# Patient Record
Sex: FEMALE | Race: WHITE | NOT HISPANIC OR LATINO | Employment: FULL TIME | URBAN - METROPOLITAN AREA
[De-identification: names, ages, dates, MRNs, and addresses within clinical notes are randomized per-mention and may not be internally consistent; named-entity substitution may affect disease eponyms.]

---

## 2017-02-01 ENCOUNTER — ALLSCRIPTS OFFICE VISIT (OUTPATIENT)
Dept: OTHER | Facility: OTHER | Age: 62
End: 2017-02-01

## 2017-02-01 DIAGNOSIS — M10.9 GOUT: ICD-10-CM

## 2017-02-01 DIAGNOSIS — I10 ESSENTIAL (PRIMARY) HYPERTENSION: ICD-10-CM

## 2017-02-01 DIAGNOSIS — N18.30 CHRONIC KIDNEY DISEASE, STAGE III (MODERATE) (HCC): ICD-10-CM

## 2017-02-01 DIAGNOSIS — E11.9 TYPE 2 DIABETES MELLITUS WITHOUT COMPLICATIONS (HCC): ICD-10-CM

## 2017-02-01 DIAGNOSIS — E78.5 HYPERLIPIDEMIA: ICD-10-CM

## 2017-02-01 DIAGNOSIS — E03.9 HYPOTHYROIDISM: ICD-10-CM

## 2017-09-21 ENCOUNTER — HOSPITAL ENCOUNTER (EMERGENCY)
Facility: HOSPITAL | Age: 62
Discharge: HOME | End: 2017-09-22
Attending: EMERGENCY MEDICINE | Admitting: EMERGENCY MEDICINE

## 2017-09-21 ENCOUNTER — APPOINTMENT (EMERGENCY)
Dept: RADIOLOGY | Facility: HOSPITAL | Age: 62
End: 2017-09-21

## 2017-09-21 DIAGNOSIS — M79.671 RIGHT FOOT PAIN: Primary | ICD-10-CM

## 2017-09-21 DIAGNOSIS — I10 HYPERTENSION: ICD-10-CM

## 2017-09-21 PROCEDURE — 73630 X-RAY EXAM OF FOOT: CPT

## 2017-09-21 RX ORDER — ACETAMINOPHEN 325 MG/1
650 TABLET ORAL ONCE
Status: COMPLETED | OUTPATIENT
Start: 2017-09-21 | End: 2017-09-21

## 2017-09-21 RX ORDER — HYDRALAZINE HYDROCHLORIDE 25 MG/1
25 TABLET, FILM COATED ORAL ONCE
Status: COMPLETED | OUTPATIENT
Start: 2017-09-21 | End: 2017-09-21

## 2017-09-21 RX ORDER — GABAPENTIN 400 MG/1
400 CAPSULE ORAL 3 TIMES DAILY
COMMUNITY
End: 2018-02-06 | Stop reason: SDUPTHER

## 2017-09-21 RX ADMIN — HYDRALAZINE HYDROCHLORIDE 25 MG: 25 TABLET, FILM COATED ORAL at 23:21

## 2017-09-21 RX ADMIN — ACETAMINOPHEN 650 MG: 325 TABLET, FILM COATED ORAL at 23:21

## 2017-09-22 VITALS
TEMPERATURE: 98.5 F | SYSTOLIC BLOOD PRESSURE: 201 MMHG | HEART RATE: 80 BPM | DIASTOLIC BLOOD PRESSURE: 93 MMHG | OXYGEN SATURATION: 98 % | RESPIRATION RATE: 16 BRPM | WEIGHT: 200 LBS | BODY MASS INDEX: 29.53 KG/M2

## 2017-09-22 PROCEDURE — 99283 EMERGENCY DEPT VISIT LOW MDM: CPT

## 2018-01-10 NOTE — RESULT NOTES
Verified Results  (1) CBC/PLT/DIFF 52CCP7793 08:51AM Flor Bolivar    Order Number: [id number]     Order Number: [id number]     Test Name Result Flag Reference   WBC COUNT 5 77 Thousand/uL  4 31-10 16   RBC COUNT 4 03 Million/uL  3 81-5 12   HEMOGLOBIN 11 9 g/dL  11 5-15 4   HEMATOCRIT 36 8 %  34 8-46  1   MCV 91 fL  82-98   MCH 29 5 pg  26 8-34 3   MCHC 32 3 g/dL  31 4-37 4   RDW 14 1 %  11 6-15 1   MPV 11 2 fL  8 9-12 7   PLATELET COUNT 491 Thousands/uL  149-390   nRBC AUTOMATED 0 /100 WBCs     NEUTROPHILS RELATIVE PERCENT 57 %  43-75   LYMPHOCYTES RELATIVE PERCENT 22 %  14-44   MONOCYTES RELATIVE PERCENT 10 %  4-12   EOSINOPHILS RELATIVE PERCENT 10 % H 0-6   BASOPHILS RELATIVE PERCENT 1 %  0-1   NEUTROPHILS ABSOLUTE COUNT 3 31 Thousands/µL  1 85-7 62   LYMPHOCYTES ABSOLUTE COUNT 1 29 Thousands/µL  0 60-4 47   MONOCYTES ABSOLUTE COUNT 0 59 Thousand/µL  0 17-1 22   EOSINOPHILS ABSOLUTE COUNT 0 55 Thousand/µL  0 00-0 61   BASOPHILS ABSOLUTE COUNT 0 03 Thousands/µL  0 00-0 10     (1) COMPREHENSIVE METABOLIC PANEL 01ADY2172 43:32VT Flor Bolivar    Order Number: [id number]      National Kidney Disease Education Program recommendations are as follows:  GFR calculation is accurate only with a steady state creatinine  Chronic Kidney disease less than 60 ml/min/1 73 sq  meters  Kidney failure less than 15 ml/min/1 73 sq  meters  Test Name Result Flag Reference   GLUCOSE,RANDM 154 mg/dL H    If the patient is fasting, the ADA then defines impaired fasting glucose as > 100 mg/dL and diabetes as > or equal to 123 mg/dL     SODIUM 143 mmol/L  136-145   POTASSIUM 3 9 mmol/L  3 5-5 3   CHLORIDE 105 mmol/L  100-108   CARBON DIOXIDE 31 mmol/L  21-32   ANION GAP (CALC) 7 mmol/L  4-13   BLOOD UREA NITROGEN 33 mg/dL H 5-25   CREATININE 1 06 mg/dL  0 60-1 30   Standardized to IDMS reference method   CALCIUM 9 0 mg/dL  8 3-10 1   BILI, TOTAL 0 37 mg/dL  0 20-1 00   ALK PHOSPHATAS 160 U/L H    ALT (SGPT) 64 U/L  12-78   AST(SGOT) 49 U/L H 5-45   ALBUMIN 3 4 g/dL L 3 5-5 0   TOTAL PROTEIN 7 3 g/dL  6 4-8 2   eGFR Non-African American 53 2 ml/min/1 73sq m       (1) TSH 22KFD0490 08:51AM Aakash Mckeon   TW Order Number: [id number]    Patients undergoing fluorescein dye angiography may retain small amounts of fluorescein in the body for 48-72 hours post procedure  Samples containing fluorescein can produce falsely depressed TSH values  If the patient had this procedure,a specimen should be resubmitted post fluorescein clearance  The recommended reference ranges for TSH during pregnancy are as follows:  First trimester 0 1 to 2 5 uIU/mL  Second trimester  0 2 to 3 0 uIU/mL  Third trimester 0 3 to 3 0 uIU/m     Test Name Result Flag Reference   TSH 0 475 uIU/mL  0 358-3 740     (1) MICROALBUMIN CREATININE RATIO, RANDOM URINE 06WMH5349 08:51AM Bernie Liz Order Number: [id number]     Test Name Result Flag Reference   MICROALBUMIN/ CREAT R 8 mg/g creatinine  0-30   MICROALBUMIN,URINE 5 2 mg/L  0 0-20 0   CREATININE URINE 61 3 mg/dL       (1) HEMOGLOBIN A1C 12OQA8322 08:51AM Aakash COLLINS Order Number: [id number]      5 7-6 4% impaired fasting glucose  >=6 5% diagnosis of diabetes    Falsely low levels are seen in conditions linked to short RBC life span-  hemolytic anemia, and splenomegaly  Falsely elevated levels are seen in situations where there is an increased production of RBC- receipt of erythropoietin or blood transfusions  Adopted from ADA-Clinical Practice Recommendations     Test Name Result Flag Reference   HEMOGLOBIN A1C 7 1 % H 4 0-5 6   EST  AVG   GLUCOSE 157 mg/dl

## 2018-01-11 NOTE — MISCELLANEOUS
Message   Recorded as Task   Date: 06/01/2016 09:00 AM, Created By: [facility]   Task Name: Follow Up   Assigned To: [facility],Team   Regarding Patient: Claudia Encarnacion, Status: Active   CommentEnmanuel Vora - 01 Jun 2016 9:00 AM     TASK CREATED    Routine post colono call placed to patient  Procedure done on 5/31/16  Patient answered and had no questions or concerns  Path pending  [address] Jun 2016 2:09 PM     TASK EDITED  Called patient with results, voicemail received and message left for patient to return call  [facility] - 08 Jun 2016 3:30 PM     TASK EDITED  Call placed to patient, patient answered and results given  Informed patient next colono is due in 5 years and a reminder letter will be sent in the mail shortly  Patient verbalized understanding  Active Problems    1  Anemia (285 9) (D64 9)   2  Asthma (493 90) (J45 909)   3  Breast cancer screening (V76 10) (Z12 39)   4  Chronic kidney disease, stage 3 (585 3) (N18 3)   5  Chronic venous stasis dermatitis of both lower extremities (454 1) (I83 11,I83 12)   6  Diabetes mellitus, type 2 (250 00) (E11 9)   7  Encounter for screening colonoscopy (V76 51) (Z12 11)   8  Gout (274 9) (M10 9)   9  Hyperlipidemia (272 4) (E78 5)   10  Hypertension (401 9) (I10)   11  Hypothyroidism (244 9) (E03 9)   12  Left wrist pain (719 43) (M25 532)   13  Nephrosclerosis, stage 1 through stage 4 or unspecified chronic kidney disease (403 90)    (I12 9,N18 9)   14  Peripheral neuropathy (356 9) (G62 9)   15  Phlebitis (451 9) (I80 9)   16  Screening for malignant neoplasm of cervix (V76 2) (Z12 4)    Current Meds   1  Accu-Chek FastClix Lancets Miscellaneous; TEST TWICE A DAY  Requested for:   74ETN5278; Last Rx:55Zuk2744 Ordered   2  Accu-Chek SmartView In [employer]; test twice daily  Requested for: 39Mns0324; Last   Rx:91Fkb8661 Ordered   3  Allopurinol 300 MG Oral Tablet; TAKE 1 TABLET DAILY;    Therapy: 73Xul0919 to (Evaluate:20Jun2016)  Requested for: 47Mgd9415; Last   Rx:61Jrh1042 Ordered   4  Furosemide 40 MG Oral Tablet; TAKE 1 TABLET TWICE DAILY  Requested for:   48MZL6842; Last Rx:01Mar2016 Ordered   5  Gabapentin 400 MG Oral Capsule; TAKE 1 CAPSULE 3 TIMES DAILY; Therapy: 15YDH1704 to (Evon Carpenter)  Requested for: 29QXJ1507; Last   Rx:90Ltc3405 Ordered   6  HydrALAZINE HCl - 25 MG Oral Tablet; TAKE ONE TABLET BY MOUTH THREE TIMES   DAILY; Therapy: 83QIA9708 to (Evaluate:15Jun2016)  Requested for: 10NWK0313; Last   Rx:40Wlg6533 Ordered   7  Levothyroxine Sodium 150 MCG Oral Tablet; take one tablet by mouth daily; Therapy: 26KAG1120 to (Evaluate:20Jun2016)  Requested for: 21Apr2016; Last   Rx:21Apr2016 Ordered   8  MetFORMIN HCl - 500 MG Oral Tablet; Take one tablet twice daily; Therapy: 34VCZ1939 to (Dharmesh Flkatiuska)  Requested for: 57DDJ3583; Last   Rx:07Mar2016 Ordered   9  Ventolin  (90 Base) MCG/ACT Inhalation Aerosol Solution; USE 2 PUFFS   EVERY 6 HOURS AS DIRECTED; Therapy: 16OOU8297 to (Last Rx:12Apr2016)  Requested for: 12Apr2016 Ordered   10  Voltaren 1 % Transdermal Gel (Diclofenac Sodium); Apply 2 gm to the affected area 3-4    times a day; Therapy: 97FTY4997 to (Last Clorinda Redder)  Requested for: 59ZUM2502 Ordered    Allergies    1  Neosporin OINT   2  Niacin TABS    3   Seafood    Signatures   Electronically signed by : Kishore Poster, ; Jun 8 2016  3:31PM EST                       (Author)

## 2018-01-13 VITALS
BODY MASS INDEX: 32.66 KG/M2 | WEIGHT: 220.5 LBS | HEIGHT: 69 IN | DIASTOLIC BLOOD PRESSURE: 80 MMHG | SYSTOLIC BLOOD PRESSURE: 118 MMHG

## 2018-01-13 NOTE — RESULT NOTES
Verified Results  * MAMMO SCREENING BILATERAL W CAD 27Ytg64 09:22AM Perez Brody Order Number: [id number]     Test Name Result Flag Reference   MAMMO SCREENING BILATERAL W CAD (Report)     Patient History:   Patient is postmenopausal    Family history of breast cancer in maternal aunt at age 79  Patient is an every day smoker  Patient's BMI is 32 3  Reason for exam: screening (asymptomatic)  Mammo Screening Bilateral W CAD: April 26, 2016 - Check In #:    [de-identified]   Bilateral CC and MLO view(s) were taken  Technologist: JOHN Vu (R)(M)   Prior study comparison: July 5, 2007, bilateral screening    mammogram w/CAD performed at [place] [address]  The breast tissue is almost entirely fat  No dominant soft tissue mass, architectural distortion or    suspicious calcifications are noted in either breast  The skin    and nipple structures are within normal limits  Scattered benign   appearing calcifications are noted  No significant changes when compared with prior studies  ASSESSMENT: BiRad:1 - Negative     Recommendation:   Routine screening mammogram of both breasts in 1 year  A    reminder letter will be scheduled  Analyzed by CAD     8-10% of cancers will be missed on mammography  Management of a    palpable abnormality must be based on clinical grounds  Patients   will be notified of their results via letter from our facility  Accredited by [employer] of [employer] and FDA       Transcription Location: JOHN Karina 98: [id number]     Risk Value(s):   Tyrer-Cuzick 10 Year: 3 795%, Tyrer-Cuzick Lifetime: 10 266%,    Myriad Table: 1 5%, AMINAH 5 Year: 0 9%, NCI Lifetime: 5 1%

## 2018-01-14 NOTE — MISCELLANEOUS
Message  Mailed colono letter to patients home address  Tasked PCPs office  {Updated pre-visit planning to reflect 5 year follow up  }      Active Problems    1  Anemia (285 9) (D64 9)   2  Asthma (493 90) (J45 909)   3  Breast cancer screening (V76 10) (Z12 39)   4  Chronic kidney disease, stage 3 (585 3) (N18 3)   5  Chronic venous stasis dermatitis of both lower extremities (454 1) (I83 11,I83 12)   6  Diabetes mellitus, type 2 (250 00) (E11 9)   7  Encounter for screening colonoscopy (V76 51) (Z12 11)   8  Gout (274 9) (M10 9)   9  Hyperlipidemia (272 4) (E78 5)   10  Hypertension (401 9) (I10)   11  Hypothyroidism (244 9) (E03 9)   12  Left wrist pain (719 43) (M25 532)   13  Nephrosclerosis, stage 1 through stage 4 or unspecified chronic kidney disease (403 90)    (I12 9,N18 9)   14  Peripheral neuropathy (356 9) (G62 9)   15  Phlebitis (451 9) (I80 9)   16  Screening for malignant neoplasm of cervix (V76 2) (Z12 4)    Current Meds   1  Accu-Chek FastClix Lancets Miscellaneous; TEST TWICE A DAY  Requested for:   94MLJ1717; Last Rx:89Obu5572 Ordered   2  Accu-Chek SmartView In [employer]; test twice daily  Requested for: 90Cvf9120; Last   Rx:31Toc8196 Ordered   3  Allopurinol 300 MG Oral Tablet; TAKE 1 TABLET DAILY; Therapy: 71HHY2330 to (Evaluate:20Jun2016)  Requested for: 06Men1195; Last   Rx:58Gim5922 Ordered   4  Furosemide 40 MG Oral Tablet; TAKE 1 TABLET TWICE DAILY  Requested for:   10AZJ8888; Last Rx:01Mar2016 Ordered   5  Gabapentin 400 MG Oral Capsule; TAKE 1 CAPSULE 3 TIMES DAILY; Therapy: 50VVF6561 to (Nathan Stewart)  Requested for: 82Tea1505; Last   Rx:17Fsc2032 Ordered   6  HydrALAZINE HCl - 25 MG Oral Tablet; TAKE ONE TABLET BY MOUTH THREE TIMES   DAILY; Therapy: 48KXX3823 to (Evaluate:15Jun2016)  Requested for: 32WGL4151; Last   Rx:84Cyy0122 Ordered   7  Levothyroxine Sodium 150 MCG Oral Tablet; take one tablet by mouth daily;    Therapy: 34FYY7109 to (Evaluate:20Jun2016)  Requested for: 21Apr2016; Last   Rx:21Apr2016 Ordered   8  MetFORMIN HCl - 500 MG Oral Tablet; Take one tablet twice daily; Therapy: 90SFX2186 to (Billie Alexander)  Requested for: 15VXD6543; Last   Rx:07Mar2016 Ordered   9  Ventolin  (90 Base) MCG/ACT Inhalation Aerosol Solution; USE 2 PUFFS   EVERY 6 HOURS AS DIRECTED; Therapy: 54ZUS1182 to (Last Rx:12Apr2016)  Requested for: 27Ngl0329 Ordered   10  Voltaren 1 % Transdermal Gel (Diclofenac Sodium); Apply 2 gm to the affected area 3-4    times a day; Therapy: 02KML3674 to (Last Ruthell Bears)  Requested for: 84ZOX1201 Ordered    Allergies    1  Neosporin OINT   2  Niacin TABS    3  Seafood    Plan  Encounter for screening colonoscopy    · COLONOSCOPY ; every 5 years;  Last 90MOA7242; Next 50DMH6234; Status:Active  Peripheral neuropathy    · Gabapentin 400 MG Oral Capsule; TAKE 1 CAPSULE 3 TIMES DAILY    Signatures   Electronically signed by : Adin Lo, ; Jun 14 2016 12:52PM EST                       (Author)

## 2018-01-15 NOTE — MISCELLANEOUS
Message   Recorded as Task   Date: 06/06/2016 11:01 AM, Created By: Rosario Ghosh   Task Name: Go to Result   Assigned To: [facility],Team   Regarding Patient: Judy Alarcon, Status: Active   CommentTerryl Cubaet - 06 Jun 2016 11:01 AM     TASK CREATED  3-5 year follow-up based on these benign polyps  Please call results  [facility] - 06 Jun 2016 1:54 PM     TASK EDITED  Call placed to patient with colono results  Voicemail recevied and message left for patient to return call  [facility] - 07 Jun 2016 2:09 PM     TASK EDITED  Call patient with results, voicemail received and message left for patient to return call  [facility] - 08 Jun 2016 3:31 PM     TASK EDITED  Call placed to patient, patient answered and results given  Informed patient next colono is due in 5 years and a reminder letter will be sent in the mail shortly  Patient verbalized understanding  Active Problems    1  Anemia (285 9) (D64 9)   2  Asthma (493 90) (J45 909)   3  Breast cancer screening (V76 10) (Z12 39)   4  Chronic kidney disease, stage 3 (585 3) (N18 3)   5  Chronic venous stasis dermatitis of both lower extremities (454 1) (I83 11,I83 12)   6  Diabetes mellitus, type 2 (250 00) (E11 9)   7  Encounter for screening colonoscopy (V76 51) (Z12 11)   8  Gout (274 9) (M10 9)   9  Hyperlipidemia (272 4) (E78 5)   10  Hypertension (401 9) (I10)   11  Hypothyroidism (244 9) (E03 9)   12  Left wrist pain (719 43) (M25 532)   13  Nephrosclerosis, stage 1 through stage 4 or unspecified chronic kidney disease (403 90)    (I12 9,N18 9)   14  Peripheral neuropathy (356 9) (G62 9)   15  Phlebitis (451 9) (I80 9)   16  Screening for malignant neoplasm of cervix (V76 2) (Z12 4)    Current Meds   1  Accu-Chek FastClix Lancets Miscellaneous; TEST TWICE A DAY  Requested for:   95TGK0485; Last Rx:97Bjz7414 Ordered   2  Accu-Chek SmartView In [employer]; test twice daily  Requested for: 95Gva1859;  Last Rx:36Oda9801 Ordered   3  Allopurinol 300 MG Oral Tablet; TAKE 1 TABLET DAILY; Therapy: 68VDH7064 to (Evaluate:20Jun2016)  Requested for: 41Qmn0945; Last   Rx:52Nhn8027 Ordered   4  Furosemide 40 MG Oral Tablet; TAKE 1 TABLET TWICE DAILY  Requested for:   63UQG6267; Last Rx:01Mar2016 Ordered   5  Gabapentin 400 MG Oral Capsule; TAKE 1 CAPSULE 3 TIMES DAILY; Therapy: 22CHO5063 to (Norman John)  Requested for: 97YFX5951; Last   Rx:94Ney0704 Ordered   6  HydrALAZINE HCl - 25 MG Oral Tablet; TAKE ONE TABLET BY MOUTH THREE TIMES   DAILY; Therapy: 70HYG5378 to (Evaluate:15Jun2016)  Requested for: 91OQR4290; Last   Rx:70Wxy2575 Ordered   7  Levothyroxine Sodium 150 MCG Oral Tablet; take one tablet by mouth daily; Therapy: 72GLT7983 to (Evaluate:20Jun2016)  Requested for: 91Vmr9624; Last   Rx:21Apr2016 Ordered   8  MetFORMIN HCl - 500 MG Oral Tablet; Take one tablet twice daily; Therapy: 27JUQ7396 to (467 75 [phone])  Requested for: 63FNC3311; Last   Rx:07Mar2016 Ordered   9  Ventolin  (90 Base) MCG/ACT Inhalation Aerosol Solution; USE 2 PUFFS   EVERY 6 HOURS AS DIRECTED; Therapy: 63DSB8497 to (Last Rx:12Apr2016)  Requested for: 09Eyx4606 Ordered   10  Voltaren 1 % Transdermal Gel (Diclofenac Sodium); Apply 2 gm to the affected area 3-4    times a day; Therapy: 38VHH1812 to (Last Vidhi Villafuerte)  Requested for: 96ZAL0311 Ordered    Allergies    1  Neosporin OINT   2  Niacin TABS    3   Seafood    Signatures   Electronically signed by : Josefina Reyes, ; Jun 8 2016  3:31PM EST                       (Author)

## 2018-02-01 RX ORDER — LEVOTHYROXINE SODIUM 0.15 MG/1
150 TABLET ORAL DAILY
Refills: 0 | OUTPATIENT
Start: 2018-02-01

## 2018-02-01 RX ORDER — FUROSEMIDE 40 MG/1
40 TABLET ORAL 2 TIMES DAILY
Qty: 60 TABLET | Refills: 0 | OUTPATIENT
Start: 2018-02-01

## 2018-02-01 NOTE — TELEPHONE ENCOUNTER
Patient needs an OV she was told that before Dr Miriam Brewer called in meds one time recently and told her she needs OV with me

## 2018-02-05 PROBLEM — E66.811 CLASS 1 OBESITY DUE TO EXCESS CALORIES WITH SERIOUS COMORBIDITY AND BODY MASS INDEX (BMI) OF 33.0 TO 33.9 IN ADULT: Status: ACTIVE | Noted: 2017-02-01

## 2018-02-05 PROBLEM — E66.09 CLASS 1 OBESITY DUE TO EXCESS CALORIES WITH SERIOUS COMORBIDITY AND BODY MASS INDEX (BMI) OF 33.0 TO 33.9 IN ADULT: Status: ACTIVE | Noted: 2017-02-01

## 2018-02-05 PROBLEM — E66.9 OBESITY: Status: ACTIVE | Noted: 2017-02-01

## 2018-02-05 RX ORDER — LANCETS
EACH MISCELLANEOUS 2 TIMES DAILY
COMMUNITY
End: 2019-08-22

## 2018-02-05 RX ORDER — ALLOPURINOL 300 MG/1
1 TABLET ORAL DAILY
COMMUNITY
Start: 2015-12-23 | End: 2018-02-06 | Stop reason: SDUPTHER

## 2018-02-06 ENCOUNTER — OFFICE VISIT (OUTPATIENT)
Dept: FAMILY MEDICINE CLINIC | Facility: CLINIC | Age: 63
End: 2018-02-06
Payer: COMMERCIAL

## 2018-02-06 VITALS
BODY MASS INDEX: 30.81 KG/M2 | HEIGHT: 69 IN | WEIGHT: 208 LBS | SYSTOLIC BLOOD PRESSURE: 180 MMHG | DIASTOLIC BLOOD PRESSURE: 110 MMHG

## 2018-02-06 DIAGNOSIS — N18.30 TYPE 2 DIABETES MELLITUS WITH STAGE 3 CHRONIC KIDNEY DISEASE, WITHOUT LONG-TERM CURRENT USE OF INSULIN (HCC): Primary | ICD-10-CM

## 2018-02-06 DIAGNOSIS — E11.22 TYPE 2 DIABETES MELLITUS WITH STAGE 3 CHRONIC KIDNEY DISEASE, WITHOUT LONG-TERM CURRENT USE OF INSULIN (HCC): Primary | ICD-10-CM

## 2018-02-06 DIAGNOSIS — G60.9 IDIOPATHIC PERIPHERAL NEUROPATHY: Primary | ICD-10-CM

## 2018-02-06 DIAGNOSIS — E03.9 ACQUIRED HYPOTHYROIDISM: ICD-10-CM

## 2018-02-06 DIAGNOSIS — M1A.39X0 CHRONIC GOUT DUE TO RENAL IMPAIRMENT OF MULTIPLE SITES WITHOUT TOPHUS: ICD-10-CM

## 2018-02-06 DIAGNOSIS — D63.1 ANEMIA IN STAGE 3 CHRONIC KIDNEY DISEASE (HCC): ICD-10-CM

## 2018-02-06 DIAGNOSIS — G60.9 IDIOPATHIC PERIPHERAL NEUROPATHY: ICD-10-CM

## 2018-02-06 DIAGNOSIS — N18.30 CHRONIC KIDNEY DISEASE, STAGE 3 (HCC): ICD-10-CM

## 2018-02-06 DIAGNOSIS — I10 ESSENTIAL HYPERTENSION: ICD-10-CM

## 2018-02-06 DIAGNOSIS — E78.2 MIXED HYPERLIPIDEMIA: ICD-10-CM

## 2018-02-06 DIAGNOSIS — E66.09 CLASS 1 OBESITY DUE TO EXCESS CALORIES WITH SERIOUS COMORBIDITY AND BODY MASS INDEX (BMI) OF 33.0 TO 33.9 IN ADULT: ICD-10-CM

## 2018-02-06 DIAGNOSIS — N18.30 ANEMIA IN STAGE 3 CHRONIC KIDNEY DISEASE (HCC): ICD-10-CM

## 2018-02-06 PROCEDURE — 99214 OFFICE O/P EST MOD 30 MIN: CPT | Performed by: FAMILY MEDICINE

## 2018-02-06 RX ORDER — GABAPENTIN 400 MG/1
CAPSULE ORAL
Qty: 90 CAPSULE | Refills: 5 | Status: SHIPPED | OUTPATIENT
Start: 2018-02-06 | End: 2018-10-15 | Stop reason: SDUPTHER

## 2018-02-06 RX ORDER — LEVOTHYROXINE SODIUM 0.15 MG/1
150 TABLET ORAL DAILY
Qty: 30 TABLET | Refills: 3 | Status: SHIPPED | OUTPATIENT
Start: 2018-02-06 | End: 2018-09-11 | Stop reason: SDUPTHER

## 2018-02-06 RX ORDER — HYDRALAZINE HYDROCHLORIDE 25 MG/1
25 TABLET, FILM COATED ORAL 3 TIMES DAILY
Qty: 90 TABLET | Refills: 3 | Status: SHIPPED | OUTPATIENT
Start: 2018-02-06 | End: 2018-02-20 | Stop reason: DRUGHIGH

## 2018-02-06 RX ORDER — FUROSEMIDE 40 MG/1
40 TABLET ORAL 2 TIMES DAILY
Qty: 60 TABLET | Refills: 3 | Status: SHIPPED | OUTPATIENT
Start: 2018-02-06 | End: 2018-09-11 | Stop reason: SDUPTHER

## 2018-02-06 RX ORDER — ALLOPURINOL 300 MG/1
300 TABLET ORAL DAILY
Qty: 30 TABLET | Refills: 3 | Status: SHIPPED | OUTPATIENT
Start: 2018-02-06 | End: 2018-10-15 | Stop reason: SDUPTHER

## 2018-02-06 NOTE — ASSESSMENT & PLAN NOTE
Blood pressure uncontrolled due to  Non compliance Patient was reminded she is at high risk for stroke, heart attack and kidney failure She will restart her medication and have a nurse blood pressure check in 2 weeks

## 2018-02-06 NOTE — PATIENT INSTRUCTIONS
Chronic Hypertension   AMBULATORY CARE:   Hypertension  is high blood pressure (BP)  Your BP is the force of your blood moving against the walls of your arteries  Normal BP is less than 120/80  Prehypertension is between 120/80 and 139/89  Hypertension is 140/90 or higher  Hypertension causes your BP to get so high that your heart has to work much harder than normal  This can damage your heart  Chronic hypertension is a long-term condition that you can control with a healthy lifestyle or medicines  A controlled blood pressure helps protect your organs, such as your heart, lungs, brain, and kidneys  Common symptoms include the following:   · Headache     · Blurred vision    · Chest pain     · Dizziness or weakness     · Trouble breathing     · Nosebleeds  Call 911 for any of the following:   · You have discomfort in your chest that feels like squeezing, pressure, fullness, or pain  · You become confused or have difficulty speaking  · You suddenly feel lightheaded or have trouble breathing  · You have pain or discomfort in your back, neck, jaw, stomach, or arm  Seek care immediately if:   · You have a severe headache or vision loss  · You have weakness in an arm or leg  Contact your healthcare provider if:   · You feel faint, dizzy, confused, or drowsy  · You have been taking your BP medicine and your BP is still higher than your healthcare provider says it should be  · You have questions or concerns about your condition or care  Treatment for chronic hypertension  may include medicine to lower your BP and lower your cholesterol level  A low cholesterol level helps prevent heart disease and makes it easier to control your blood pressure  Heart disease can make your blood pressure harder to control  You may also need to make lifestyle changes  Take your medicine exactly as directed    Manage chronic hypertension:  Talk with your healthcare provider about these and other ways to manage hypertension:  · Take your BP at home  Sit and rest for 5 minutes before you take your BP  Extend your arm and support it on a flat surface  Your arm should be at the same level as your heart  Follow the directions that came with your BP monitor  If possible, take at least 2 BP readings each time  Take your BP at least twice a day at the same times each day, such as morning and evening  Keep a record of your BP readings and bring it to your follow-up visits  Ask your healthcare provider what your blood pressure should be  · Limit sodium (salt) as directed  Too much sodium can affect your fluid balance  Check labels to find low-sodium or no-salt-added foods  Some low-sodium foods use potassium salts for flavor  Too much potassium can also cause health problems  Your healthcare provider will tell you how much sodium and potassium are safe for you to have in a day  He or she may recommend that you limit sodium to 2,300 mg a day  · Follow the meal plan recommended by your healthcare provider  A dietitian or your provider can give you more information on low-sodium plans or the DASH (Dietary Approaches to Stop Hypertension) eating plan  The DASH plan is low in sodium, unhealthy fats, and total fat  It is high in potassium, calcium, and fiber  · Exercise to maintain a healthy weight  Exercise at least 30 minutes per day, on most days of the week  This will help decrease your blood pressure  Ask about the best exercise plan for you  · Decrease stress  This may help lower your BP  Learn ways to relax, such as deep breathing or listening to music  · Limit alcohol  Women should limit alcohol to 1 drink a day  Men should limit alcohol to 2 drinks a day  A drink of alcohol is 12 ounces of beer, 5 ounces of wine, or 1½ ounces of liquor  · Do not smoke  Nicotine and other chemicals in cigarettes and cigars can increase your BP and also cause lung damage   Ask your healthcare provider for information if you currently smoke and need help to quit  E-cigarettes or smokeless tobacco still contain nicotine  Talk to your healthcare provider before you use these products  Follow up with your healthcare provider as directed: You will need to return to have your BP checked and to have other lab tests done  Write down your questions so you remember to ask them during your visits  © 2017 [address] [employer] Information is for End User's use only and may not be sold, redistributed or otherwise used for commercial purposes  All illustrations and images included in CareNotes® are the copyrighted property of [employer]  or [employer]  The above information is an  only  It is not intended as medical advice for individual conditions or treatments  Talk to your doctor, nurse or pharmacist before following any medical regimen to see if it is safe and effective for you  Diabetes in the Older Adult   AMBULATORY CARE:   What you need to know if you are an older adult with diabetes:  Older adults with diabetes are at risk for heart disease, stroke, kidney disease, blindness, and nerve damage  You may also be at risk for any of the following:  · Poor nutrition or low blood sugar levels    · Confusion or problems with memory, attention, or learning new things    · Trouble controlling urination or frequent urinary tract infections    · Trouble with coordination or balance    · Falls and injuries    · Pain    · Depression    · Open sores on your legs or feet  The ABCs of diabetes: The ABCs stand for certain things you can do to manage or prevent problems caused by diabetes:  · A  stands for A1c test   This test shows the average amount of sugar in your blood over the past 2 to 3 months  High levels of sugar in your blood can cause damage to your heart, blood vessels, kidneys, feet, and eyes  Most older adults with diabetes should have an A1c level less than 7 5   Ask your healthcare provider if this A1c goal is right for you  Your provider can help you make changes if your A1c is too high  · B  stands for blood pressure   High blood pressure can increase your risk for a heart attack, stroke, or kidney disease  Most older adults with diabetes should have a systolic blood pressure (first number) of 140  Your diastolic blood pressure (second number) should be below 90  Ask your healthcare provider if these blood pressure goals are right for you  · C  stands for cholesterol   High levels of cholesterol can block your arteries and cause a heart attack or stroke  Ask your healthcare provider what your cholesterol levels should be  · S  stands for stop smoking   Nicotine and other chemicals in cigarettes and cigars can cause lung damage and make it more difficult to manage your diabetes  Call 911 if you have any of the following:   · You have any of the following signs of a stroke:      ¨ Numbness or drooping on one side of your face     ¨ Weakness in an arm or leg    ¨ Confusion or difficulty speaking    ¨ Dizziness, a severe headache, or vision loss    · You have any of the following signs of a heart attack:      ¨ Squeezing, pressure, or pain in your chest that lasts longer than 5 minutes or returns    ¨ Discomfort or pain in your back, neck, jaw, stomach, or arm     ¨ Trouble breathing    ¨ Nausea or vomiting    ¨ Lightheadedness or a sudden cold sweat, especially with chest pain or trouble breathing  Seek care immediately if:   · You have severe abdominal pain, or the pain spreads to your back  You may also be vomiting  · You have trouble staying awake or focusing  · You are shaking or sweating  · You have blurred or double vision  · Your breath has a fruity, sweet smell  · Your breathing is deep and labored, or rapid and shallow  · Your heartbeat is fast and weak  · You fall and get hurt    Contact your healthcare provider if:   · You are vomiting or have diarrhea  · You have an upset stomach and cannot eat the foods on your meal plan  · You feel weak or more tired than usual      · You feel dizzy, have headaches, or are easily irritated  · Your skin is red, warm, dry, or swollen  · You have a wound that does not heal      · You have numbness in your arms or legs  · You have trouble coping with your illness, or you feel anxious or depressed  · You have problems with your memory  · You have changes in your vision  · You have questions or concerns about your condition or care  Treatment for diabetes  includes keeping your blood sugar at a normal level  You must eat the right foods, and exercise regularly  You may need medicine if you cannot control your blood sugar level with nutrition and exercise  You may also need medicine to lower your blood pressure or cholesterol, or medicine to prevent blood clots  Manage the ABCs and prevent problems caused by diabetes:   · Check your blood sugar levels as directed  Your healthcare provider will tell you when and how often to check during the day  Your healthcare provider will also tell you what your blood sugar levels should be before and after a meal  You may need to check for ketones in your urine or blood if your level is higher than directed  Write down your results and show them to your healthcare provider  Your provider may use the results to make changes to your medicine, food, or exercise schedules  Ask your healthcare provider for more information about how to treat a high or low blood sugar level  · Follow your meal plan as directed  A dietitian will help you make a meal plan to keep your blood sugar level steady and make sure you get enough nutrition  Do not skip meals  Your blood sugar level may drop too low if you have taken diabetes medicine and do not eat   Ask your healthcare provider about programs in your community that can deliver the meals to your home  · Try to be active for 30 to 60 minutes most days of the week  Exercise can help keep your blood sugar level steady, decrease your risk of heart disease, and help you lose weight  It can also help improve your balance and decrease your risk for falls  Work with your healthcare provider to create an exercise plan  Ask a family member or friend to exercise with you  Start slow and exercise for 5 to 10 minutes at a time  Examples of activities include walking or swimming  Include muscle strengthening activities 2 to 3 days each week  Include balance training 2 to 3 times each week  Activities that help increase balance include yoga and fred chi      · Maintain a healthy weight  Ask your healthcare provider how much you should weigh  A healthy weight can help you control your diabetes and prevent heart disease  Ask your provider to help you create a weight loss plan if you are overweight  Together you can set manageable weight loss goals  · Do not smoke  Ask your healthcare provider for information if you currently smoke and need help to quit  Do not use e-cigarettes or smokeless tobacco in place of cigarettes or to help you quit  They still contain nicotine  · Manage stress  Stress may increase your blood sugar level  Deep breathing, muscle relaxation, and music may help you relax  Ask your healthcare provider for more information about these practices  Other ways to manage your diabetes:   · Check your feet every day for sores  Look at your whole foot, including the bottom, and between and under your toes  Check for wounds, corns, and calluses  Use a mirror to see the bottom of your feet  The skin on your feet may be shiny, tight, dry, or darker than normal  Your feet may also be cold and pale  Feel your feet by running your hands along the tops, bottoms, sides, and between your toes  Redness, swelling, and warmth are signs of blood flow problems that can lead to a foot ulcer   Do not try to remove corns or calluses yourself  · Wear medical alert identification  Wear medical alert jewelry or carry a card that says you have diabetes  Ask your healthcare provider where to get these items  · Ask about vaccines  You have a higher risk for serious illness if you get the flu, pneumonia, or hepatitis  Ask your healthcare provider if you should get a flu, pneumonia, shingles, or hepatitis B vaccine, and when to get the vaccine  · Keep all appointments  You may need to return to have your A1c checked every 3 months  You will need to return at least once each year to have your feet checked  You will need an eye exam once a year to check for retinopathy  You will also need urine tests every year to check for kidney problems  You may need tests to monitor for heart disease  Write down your questions so you remember to ask them during your visits  · Get help from family and friends  You may need help checking your blood sugar level, giving insulin injections, or preparing your meals  Ask your family and friends to help you with these tasks  Talk to your healthcare provider if you do not have someone at home to help you  A healthcare provider can come to your home to help you with these tasks  Follow up with your healthcare provider as directed: You may need to return to have your A1c checked every 3 months  You will need to return at least once each year to have your feet checked  You will need an eye exam once a year to check for retinopathy  You will also need urine tests every year to check for kidney problems  You may need tests to monitor for heart disease  Write down your questions so you remember to ask them during your visits  © 2017 [facility] Information is for End User's use only and may not be sold, redistributed or otherwise used for commercial purposes   All illustrations and images included in CareNotes® are the copyrighted property of [employer]  or [employer]  The above information is an  only  It is not intended as medical advice for individual conditions or treatments  Talk to your doctor, nurse or pharmacist before following any medical regimen to see if it is safe and effective for you

## 2018-02-06 NOTE — ASSESSMENT & PLAN NOTE
Patient currently non compliant with meds for last 2 months She will have the A1c done patient was again reminded that noncompliance with treatment means she is at increased risk for stroke, blindness and kidney failure She will restart her medications and see me in 1 month

## 2018-02-06 NOTE — PROGRESS NOTES
Assessment/Plan:    Type 2 diabetes mellitus with stage 3 chronic kidney disease, without long-term current use of insulin ([facility])  Patient currently non compliant with meds for last 2 months She will have the A1c done patient was again reminded that noncompliance with treatment means she is at increased risk for stroke, blindness and kidney failure She will restart her medications and see me in 1 month    Essential hypertension  Blood pressure uncontrolled due to  Non compliance Patient was reminded she is at high risk for stroke, heart attack and kidney failure She will restart her medication and have a nurse blood pressure check in 2 weeks       Diagnoses and all orders for this visit:    Type 2 diabetes mellitus with stage 3 chronic kidney disease, without long-term current use of insulin ([facility])  -     metFORMIN (GLUCOPHAGE) 500 mg tablet; Take 1 tablet (500 mg total) by mouth 2 (two) times a day with meals  -     Hemoglobin A1c; Future  -     Lipid panel; Future  -     Microalbumin / creatinine urine ratio    Essential hypertension  -     furosemide (LASIX) 40 mg tablet; Take 1 tablet (40 mg total) by mouth 2 (two) times a day  -     hydrALAZINE (APRESOLINE) 25 mg tablet; Take 1 tablet (25 mg total) by mouth 3 (three) times a day  -     Lipid panel; Future    Chronic kidney disease, stage 3  Comments:  Patient will have labs done and restart her medications She has no insurance and so will not see nephrology at this time  Orders:  -     furosemide (LASIX) 40 mg tablet; Take 1 tablet (40 mg total) by mouth 2 (two) times a day  -     Uric acid;  Future    Anemia in stage 3 chronic kidney disease  Comments:  stable in 2016 will repeat labs now  Orders:  -     CBC and differential; Future    Mixed hyperlipidemia  Comments:  patient will have labs done Her last lipids in 2016 were controlled with diet    Chronic gout 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 tophus  Comments:  stable on current medicaion  Orders:  -     allopurinol (ZYLOPRIM) 300 mg tablet; Take 1 tablet (300 mg total) by mouth daily  -     Uric acid; Future    Class 1 obesity due to excess calories with serious comorbidity and body mass index (BMI) of 33 0 to 33 9 in adult  Comments:  discussed continued weight loss and diet    Acquired hypothyroidism  Comments[facility]  [facility]  Orders:  -     levothyroxine 150 mcg tablet; Take 1 tablet (150 mcg total) by mouth daily  -     TSH, 3rd generation with T4 reflex; Future    Idiopathic peripheral neuropathy    Other orders  -     ACCU-CHEK FASTCLIX LANCETS MISC; by Does not apply route 2 (two) times a day  -     glucose blood (ACCU-CHEK SMARTVIEW) test strip; by In Vitro route 2 (two) times a day  -     Discontinue: allopurinol (ZYLOPRIM) 300 mg tablet; Take 1 tablet by mouth daily          Subjective:   Chief Complaint   Patient presents with    Diabetes     refill all meds 30 days     Hypertension    Chronic Kidney Disease    Hyperlipidemia          Patient ID: Emily Moore is a 63 y o  female  Patient is here for followup fo her hypertension, hyperlipidemia, diabetes, hypothyroidism, stage 3 renal disease and anemia due to stage 3 renal disease She has not taken any meds for 6 months due to noncompliance and no insurance She has not had labs since 2016 She ahs not seen nephrology since 2016 either She has not had an office visit here in 1 year patient states she is checking sugars a few times per week and they are in the morning about 110 She is still smoking also       Diabetes   She presents for her follow-up diabetic visit  She has type 2 diabetes mellitus  Pertinent negatives for hypoglycemia include no confusion, dizziness, headaches, nervousness/anxiousness or sweats   Pertinent negatives for diabetes include no blurred vision, no chest pain, no fatigue, no foot paresthesias, no foot ulcerations, no polydipsia, no polyphagia, no polyuria, no visual change, no weakness and no weight loss  There are no hypoglycemic complications  Diabetic complications include autonomic neuropathy, nephropathy and peripheral neuropathy  Pertinent negatives for diabetic complications include no CVA, heart disease, impotence, PVD or retinopathy  Risk factors for coronary artery disease include post-menopausal, tobacco exposure, obesity, hypertension, diabetes mellitus, dyslipidemia and family history  When asked about current treatments, none were reported  She is compliant with treatment some of the time  Her weight is decreasing steadily  She is following a diabetic diet  She has not had a previous visit with a dietitian  She rarely participates in exercise  There is no change in her home blood glucose trend  Her breakfast blood glucose is taken between 7-8 am  Her breakfast blood glucose range is generally 130-140 mg/dl  An ACE inhibitor/angiotensin II receptor blocker is contraindicated  She does not see a podiatrist Eye exam is not current  Hypertension   This is a chronic problem  The current episode started more than 1 year ago  The problem has been waxing and waning since onset  The problem is uncontrolled  Associated symptoms include peripheral edema  Pertinent negatives include no anxiety, blurred vision, chest pain, headaches, malaise/fatigue, neck pain, orthopnea, palpitations, PND, shortness of breath or sweats  There are no associated agents to hypertension  Risk factors for coronary artery disease include diabetes mellitus, dyslipidemia, obesity and sedentary lifestyle  Past treatments include calcium channel blockers and diuretics  Compliance problems include medication cost and psychosocial issues  There is no history of CVA, PVD or retinopathy         The following portions of the patient's history were reviewed and updated as appropriate: allergies, current medications, past medical history, past social history and problem list     Review of Systems   Constitutional: Negative for fatigue, fever, malaise/fatigue, unexpected weight change and weight loss  HENT: Negative for congestion, sinus pain and trouble swallowing  Eyes: Negative for blurred vision, discharge and visual disturbance  Respiratory: Negative for cough, chest tightness, shortness of breath and wheezing  Cardiovascular: Positive for leg swelling  Negative for chest pain, palpitations, orthopnea and PND  Gastrointestinal: Negative for abdominal pain, blood in stool, constipation, diarrhea, nausea and vomiting  Endocrine: Negative for polydipsia, polyphagia and polyuria  Genitourinary: Negative for difficulty urinating, dysuria, frequency, hematuria and impotence  Musculoskeletal: Negative for arthralgias, gait problem, joint swelling and neck pain  Skin: Negative for rash and wound  Allergic/Immunologic: Negative for environmental allergies and food allergies  Neurological: Negative for dizziness, syncope, weakness, numbness and headaches  Hematological: Negative for adenopathy  Does not bruise/bleed easily  Psychiatric/Behavioral: Negative for confusion, decreased concentration and sleep disturbance  The patient is not nervous/anxious  Objective:     Physical Exam   Constitutional: She is oriented to person, place, and time  She appears well-developed and well-nourished  HENT:   Head: Normocephalic and atraumatic  Right Ear: Hearing, tympanic membrane and external ear normal    Left Ear: Hearing, tympanic membrane and external ear normal    Eyes: Conjunctivae and EOM are normal  Pupils are equal, round, and reactive to light  Neck: Neck supple  No thyromegaly present  Cardiovascular: Normal rate and normal heart sounds  Pulmonary/Chest: Effort normal and breath sounds normal  She has no wheezes  She has no rales  Abdominal: Soft  Bowel sounds are normal  She exhibits no distension  There is no tenderness  Musculoskeletal: She exhibits edema   She exhibits no tenderness  Stable varicose veins and edema of the legs   Lymphadenopathy:     She has no cervical adenopathy  Neurological: She is alert and oriented to person, place, and time  No cranial nerve deficit  Coordination normal    Skin: Skin is warm and dry  No rash noted  Psychiatric: She has a normal mood and affect   Her behavior is normal  Judgment and thought content normal

## 2018-02-09 ENCOUNTER — HOSPITAL ENCOUNTER (EMERGENCY)
Facility: HOSPITAL | Age: 63
Discharge: HOME | End: 2018-02-09
Attending: EMERGENCY MEDICINE | Admitting: EMERGENCY MEDICINE

## 2018-02-09 ENCOUNTER — APPOINTMENT (EMERGENCY)
Dept: CT IMAGING | Facility: HOSPITAL | Age: 63
End: 2018-02-09

## 2018-02-09 VITALS
RESPIRATION RATE: 20 BRPM | WEIGHT: 207 LBS | TEMPERATURE: 98.5 F | BODY MASS INDEX: 30.57 KG/M2 | DIASTOLIC BLOOD PRESSURE: 81 MMHG | OXYGEN SATURATION: 97 % | SYSTOLIC BLOOD PRESSURE: 179 MMHG | HEART RATE: 59 BPM

## 2018-02-09 DIAGNOSIS — R51.9 HEADACHE: ICD-10-CM

## 2018-02-09 DIAGNOSIS — R04.0 EPISTAXIS: Primary | ICD-10-CM

## 2018-02-09 LAB
ANION GAP SERPL CALCULATED.3IONS-SCNC: 12 MMOL/L (ref 4–13)
ATRIAL RATE: 65 BPM
BASOPHILS # BLD AUTO: 0.03 THOUSANDS/ΜL (ref 0–0.1)
BASOPHILS NFR BLD AUTO: 0 % (ref 0–1)
BUN SERPL-MCNC: 37 MG/DL (ref 5–25)
CALCIUM SERPL-MCNC: 8.7 MG/DL (ref 8.3–10.1)
CHLORIDE SERPL-SCNC: 105 MMOL/L (ref 100–108)
CO2 SERPL-SCNC: 28 MMOL/L (ref 21–32)
CREAT SERPL-MCNC: 1.32 MG/DL (ref 0.6–1.3)
EOSINOPHIL # BLD AUTO: 0.19 THOUSAND/ΜL (ref 0–0.61)
EOSINOPHIL NFR BLD AUTO: 3 % (ref 0–6)
ERYTHROCYTE [DISTWIDTH] IN BLOOD BY AUTOMATED COUNT: 13.9 % (ref 11.6–15.1)
GFR SERPL CREATININE-BSD FRML MDRD: 44 ML/MIN/1.73SQ M
GLUCOSE SERPL-MCNC: 121 MG/DL (ref 65–140)
HCT VFR BLD AUTO: 35.9 % (ref 34.8–46.1)
HGB BLD-MCNC: 11.7 G/DL (ref 11.5–15.4)
LYMPHOCYTES # BLD AUTO: 1.62 THOUSANDS/ΜL (ref 0.6–4.47)
LYMPHOCYTES NFR BLD AUTO: 22 % (ref 14–44)
MCH RBC QN AUTO: 32.3 PG (ref 26.8–34.3)
MCHC RBC AUTO-ENTMCNC: 32.6 G/DL (ref 31.4–37.4)
MCV RBC AUTO: 99 FL (ref 82–98)
MONOCYTES # BLD AUTO: 0.61 THOUSAND/ΜL (ref 0.17–1.22)
MONOCYTES NFR BLD AUTO: 8 % (ref 4–12)
NEUTROPHILS # BLD AUTO: 4.89 THOUSANDS/ΜL (ref 1.85–7.62)
NEUTS SEG NFR BLD AUTO: 67 % (ref 43–75)
NRBC BLD AUTO-RTO: 0 /100 WBCS
P AXIS: 115 DEGREES
PLATELET # BLD AUTO: 215 THOUSANDS/UL (ref 149–390)
PMV BLD AUTO: 11.1 FL (ref 8.9–12.7)
POTASSIUM SERPL-SCNC: 3.8 MMOL/L (ref 3.5–5.3)
PR INTERVAL: 150 MS
QRS AXIS: 157 DEGREES
QRSD INTERVAL: 96 MS
QT INTERVAL: 392 MS
QTC INTERVAL: 407 MS
RBC # BLD AUTO: 3.62 MILLION/UL (ref 3.81–5.12)
SODIUM SERPL-SCNC: 145 MMOL/L (ref 136–145)
T WAVE AXIS: 169 DEGREES
VENTRICULAR RATE: 65 BPM
WBC # BLD AUTO: 7.34 THOUSAND/UL (ref 4.31–10.16)

## 2018-02-09 PROCEDURE — 93005 ELECTROCARDIOGRAM TRACING: CPT

## 2018-02-09 PROCEDURE — 70450 CT HEAD/BRAIN W/O DYE: CPT

## 2018-02-09 PROCEDURE — 80048 BASIC METABOLIC PNL TOTAL CA: CPT | Performed by: EMERGENCY MEDICINE

## 2018-02-09 PROCEDURE — 85025 COMPLETE CBC W/AUTO DIFF WBC: CPT | Performed by: EMERGENCY MEDICINE

## 2018-02-09 PROCEDURE — 36415 COLL VENOUS BLD VENIPUNCTURE: CPT | Performed by: EMERGENCY MEDICINE

## 2018-02-09 PROCEDURE — 99284 EMERGENCY DEPT VISIT MOD MDM: CPT

## 2018-02-09 PROCEDURE — 93010 ELECTROCARDIOGRAM REPORT: CPT | Performed by: INTERNAL MEDICINE

## 2018-02-09 RX ORDER — ACETAMINOPHEN 325 MG/1
650 TABLET ORAL ONCE
Status: COMPLETED | OUTPATIENT
Start: 2018-02-09 | End: 2018-02-09

## 2018-02-09 RX ORDER — OXYMETAZOLINE HYDROCHLORIDE 0.05 G/100ML
2 SPRAY NASAL ONCE
Status: COMPLETED | OUTPATIENT
Start: 2018-02-09 | End: 2018-02-09

## 2018-02-09 RX ORDER — IBUPROFEN 600 MG/1
600 TABLET ORAL ONCE
Status: COMPLETED | OUTPATIENT
Start: 2018-02-09 | End: 2018-02-09

## 2018-02-09 RX ADMIN — Medication 2 SPRAY: at 20:17

## 2018-02-09 RX ADMIN — ACETAMINOPHEN 650 MG: 325 TABLET, FILM COATED ORAL at 20:57

## 2018-02-09 RX ADMIN — IBUPROFEN 600 MG: 600 TABLET, FILM COATED ORAL at 22:17

## 2018-02-10 NOTE — ED PROVIDER NOTES
History  Chief Complaint   Patient presents with   Lemuel Lara     Pt states she started getting a nose bleed 30 mins ago, denies injury, denies hx of nose bleeds, denies taking blood thiners      HPI    This is a 62 yo F who presents today with epistaxis from left nare  She states no previous history of nose bleeds  States she was at work and blew her nose and started bleeding  States bleeding coming out anteriorly  States does not feel bleeding in the back of her throat  No chest pain or shortness of breath  No hx of polyps  No recent trauma  Does not take any blood thinners  No other compliants  Impression: 62 yo F with epistaxis  Will try afrin, pressure    Prior to Admission Medications   Prescriptions Last Dose Informant Patient Reported? Taking? ACCU-CHEK FASTCLIX LANCETS MISC   Yes No   Sig: by Does not apply route 2 (two) times a day   albuterol (PROVENTIL HFA,VENTOLIN HFA) 90 mcg/act inhaler   Yes No   Sig: Inhale 2 puffs every 6 (six) hours as needed for wheezing     allopurinol (ZYLOPRIM) 300 mg tablet   No No   Sig: Take 1 tablet (300 mg total) by mouth daily   furosemide (LASIX) 40 mg tablet   No No   Sig: Take 1 tablet (40 mg total) by mouth 2 (two) times a day   gabapentin (NEURONTIN) 400 mg capsule   No No   Sig: TAKE ONE CAPSULE BY MOUTH THREE TIMES DAILY   glucose blood (ACCU-CHEK SMARTVIEW) test strip   Yes No   Sig: by In Vitro route 2 (two) times a day   hydrALAZINE (APRESOLINE) 25 mg tablet   No No   Sig: Take 1 tablet (25 mg total) by mouth 3 (three) times a day   levothyroxine 150 mcg tablet   No No   Sig: Take 1 tablet (150 mcg total) by mouth daily   metFORMIN (GLUCOPHAGE) 500 mg tablet   No No   Sig: Take 1 tablet (500 mg total) by mouth 2 (two) times a day with meals      Facility-Administered Medications: None       Past Medical History:   Diagnosis Date    Acute renal failure (ARF) (HCC)     Anemia     Asthma     Atopic dermatitis     Chronic kidney disease     Stage 3    Diabetes mellitus ([facility] [address] )     type 2    Disease of thyroid gland     hypothyroidism    Edema of both legs     Gout     H/O colonoscopy     H/O mammogram     Hyperlipidemia     Hypertension     Nephrosclerosis     Pain in unspecified foot     Peripheral neuropathy     Phlebitis     Venous stasis dermatitis of both lower extremities     Wrist joint pain     LEFT       Past Surgical History:   Procedure Laterality Date    CERVIX SURGERY      COLONOSCOPY N/A 5/31/2016    Procedure: COLONOSCOPY;  Surgeon: Braden Lee MD;  Location: [facility]; Service:     VEIN SURGERY         Family History   Problem Relation Age of Onset    Diabetes Mother     Heart attack Father      I have reviewed and agree with the history as documented      Social History   Substance Use Topics    Smoking status: Current Every Day Smoker     Packs/day: 0 50    Smokeless tobacco: Never Used      Comment: 30    Alcohol use No      Comment: social        Review of Systems  REVIEW OF SYSTEMS  Constitutional:  Denies fever or chills   Eyes:  Denies change in visual acuity   HENT:  Denies nasal congestion or sore throat +epistaxis   Respiratory:  Denies cough or shortness of breath   Cardiovascular:  Denies chest pain or edema   GI:  Denies abdominal pain, nausea, vomiting, bloody stools or diarrhea   :  Denies dysuria   Musculoskeletal:  Denies back pain or joint pain   Integument:  Denies rash   Neurologic:  Denies headache, focal weakness or sensory changes   Endocrine:  Denies polyuria or polydipsia   Lymphatic:  Denies swollen glands   Psychiatric:  Denies depression or anxiety     Physical Exam  ED Triage Vitals   Temperature Pulse Respirations Blood Pressure SpO2   02/09/18 1929 02/09/18 1929 02/09/18 1929 02/09/18 1929 02/09/18 1929   98 5 °F (36 9 °C) 89 20 (!) 232/102 98 %      Temp Source Heart Rate Source Patient Position - Orthostatic VS BP Location FiO2 (%)   02/09/18 1929 02/09/18 1929 02/09/18 2152 02/09/18 1929 --   Temporal Monitor Lying Left arm       Pain Score       02/09/18 2152       8           Orthostatic Vital Signs  Vitals:    02/09/18 1929 02/09/18 1933 02/09/18 2152   BP: (!) 232/102 (!) 222/94 (!) 179/81   Pulse: 89  59   Patient Position - Orthostatic VS:   Lying       Physical Exam  PHYSICAL EXAM    Constitutional:  Well developed, well nourished, no acute distress, non-toxic appearance    HEENT:  Atraumatic, PERRL, conjunctiva normal  Oropharynx moist, no pharyngeal exudates  Left nare with bright red blood unable to find source  No blood in posterior oropharynx  No polyp appreciatedNeck- normal range of motion, no tenderness, supple   Respiratory:  No respiratory distress, normal breath sounds, no rales, no wheezing   Cardiovascular:  Normal rate, normal rhythm, no murmurs, no gallops, no rubs   GI:  Soft, nondistended, normal bowel sounds, nontender, no organomegaly, no mass, no rebound, no guarding   :  No costovertebral angle tenderness   Musculoskeletal:  No edema, no tenderness, no deformities   Back- no tenderness  Integument:  Well hydrated, no rash   Lymphatic:  No lymphadenopathy noted   Neurologic:  Alert & oriented x 3, CN 2-12 normal, normal motor function, normal sensory function, no focal deficits noted   Psychiatric:  Speech and behavior appropriate     ED Medications  Medications   oxymetazoline (AFRIN) 0 05 % nasal spray 2 spray (2 sprays Each Nare Given 2/9/18 2017)   acetaminophen (TYLENOL) tablet 650 mg (650 mg Oral Given 2/9/18 2057)   ibuprofen (MOTRIN) tablet 600 mg (600 mg Oral Given 2/9/18 2217)       Diagnostic Studies  Results Reviewed     Procedure Component Value Units Date/Time    Basic metabolic panel [[id number]]  (Abnormal) Collected:  02/09/18 2057    Lab Status:  Final result Specimen:  Blood from Arm, Right Updated:  02/09/18 2116     Sodium 145 mmol/L      Potassium 3 8 mmol/L      Chloride 105 mmol/L      CO2 28 mmol/L      Anion Gap 12 mmol/L      BUN 37 (H) mg/dL Creatinine 1 32 (H) mg/dL      Glucose 121 mg/dL      Calcium 8 7 mg/dL      eGFR 44 ml/min/1 73sq m     Narrative:         National Kidney Disease Education Program recommendations are as follows:  GFR calculation is accurate only with a steady state creatinine  Chronic Kidney disease less than 60 ml/min/1 73 sq  meters  Kidney failure less than 15 ml/min/1 73 sq  meters  CBC and differential [[id number]]  (Abnormal) Collected:  02/09/18 2057    Lab Status:  Final result Specimen:  Blood from Arm, Right Updated:  02/09/18 2107     WBC 7 34 Thousand/uL      RBC 3 62 (L) Million/uL      Hemoglobin 11 7 g/dL      Hematocrit 35 9 %      MCV 99 (H) fL      MCH 32 3 pg      MCHC 32 6 g/dL      RDW 13 9 %      MPV 11 1 fL      Platelets 561 Thousands/uL      nRBC 0 /100 WBCs      Neutrophils Relative 67 %      Lymphocytes Relative 22 %      Monocytes Relative 8 %      Eosinophils Relative 3 %      Basophils Relative 0 %      Neutrophils Absolute 4 89 Thousands/µL      Lymphocytes Absolute 1 62 Thousands/µL      Monocytes Absolute 0 61 Thousand/µL      Eosinophils Absolute 0 19 Thousand/µL      Basophils Absolute 0 03 Thousands/µL                  CT head without contrast   Final Result by Chris Solitario MD (02/09 2141)      1  No acute intracranial abnormality  2   Chronic small vessel ischemic changes  3   Nasal secretions are seen on the left in keeping with history of epistaxis  Workstation performed: [id number]               Procedures  Procedures      Phone Consults  ED Phone Contact    ED Course  ED Course as of Feb 10 0039   Edna Hakan Feb 09, 2018   2152 States headache in improving  Last blood pressure 190/80    2153 Bleeding stopped, afrin used   Will monitor for rebleeding     2205 Bleeding stopped, blood presssure improved and headache better willl discharge with appropriate return precautions                                MDM  CritCare Time    Disposition  Final diagnoses:   Epistaxis   Headache Time reflects when diagnosis was documented in both MDM as applicable and the Disposition within this note     Time User Action Codes Description Comment    2/9/2018 10:05 PM Ruchi Almaraz Eli Morales U  8  [R04 0] Epistaxis     2/9/2018 10:05 PM Ruchi Almaraz, [address] [facility] Headache       ED Disposition     ED Disposition Condition Comment    Discharge  Mik Rodrigues discharge to home/self care  Condition at discharge: Good        Follow-up Information     Follow up With Specialties Details Why Contact Info    Brook Kendall MD Otolaryngology Schedule an appointment as soon as possible for a visit As needed, If symptoms worsen [id number] [facility] [address]  [facility] [place] [address]          Discharge Medication List as of 2/9/2018 10:11 PM      CONTINUE these medications which have NOT CHANGED    Details   ACCU-CHEK FASTCLIX LANCETS MISC by Does not apply route 2 (two) times a day, Historical Med      albuterol (PROVENTIL HFA,VENTOLIN HFA) 90 mcg/act inhaler Inhale 2 puffs every 6 (six) hours as needed for wheezing , Until Discontinued, Historical Med      allopurinol (ZYLOPRIM) 300 mg tablet Take 1 tablet (300 mg total) by mouth daily, Starting Tue 2/6/2018, Normal      furosemide (LASIX) 40 mg tablet Take 1 tablet (40 mg total) by mouth 2 (two) times a day, Starting Tue 2/6/2018, Normal      gabapentin (NEURONTIN) 400 mg capsule TAKE ONE CAPSULE BY MOUTH THREE TIMES DAILY, Normal      glucose blood (ACCU-CHEK SMARTVIEW) test strip by In Vitro route 2 (two) times a day, Historical Med      hydrALAZINE (APRESOLINE) 25 mg tablet Take 1 tablet (25 mg total) by mouth 3 (three) times a day, Starting Tue 2/6/2018, Normal      levothyroxine 150 mcg tablet Take 1 tablet (150 mcg total) by mouth daily, Starting Tue 2/6/2018, Normal      metFORMIN (GLUCOPHAGE) 500 mg tablet Take 1 tablet (500 mg total) by mouth 2 (two) times a day with meals, Starting Tue 2/6/2018, Normal           No discharge procedures on file      ED Provider  Attending physically available and evaluated Aneta Cox  NATASHA managed the patient along with the ED Attending      Electronically Signed by         Lillard Landau, MD  02/10/18 0502

## 2018-02-10 NOTE — DISCHARGE INSTRUCTIONS
Epistaxis   WHAT YOU SHOULD KNOW:   Epistaxis is a nosebleed  A nosebleed occurs when the blood vessels near the surface of the nasal cavity are injured or damaged  AFTER YOU LEAVE:   Medicines:   · Nasal sprays:  Vasoconstrictor nasal spray is a medicine that helps make nasal blood vessels narrower  This limits the blood flow and stops the bleeding  This medicine also decreases the swelling inside your nose and helps you breathe easier  You may also be directed to use saline or other nasal sprays to add moisture to your nose  · Antibiotics: This medicine is given to help treat or prevent an infection caused by bacteria  · Take your medicine as directed  Call your healthcare provider if you think your medicine is not helping or if you have side effects  Tell him if you are allergic to any medicine  Keep a list of the medicines, vitamins, and herbs you take  Include the amounts, and when and why you take them  Bring the list or the pill bottles to follow-up visits  Carry your medicine list with you in case of an emergency  Follow up with your primary healthcare provider or otolaryngologist within 2 to 3 days or as directed: Any packing in your nose should be removed within 2 to 3 days  Write down your questions so you remember to ask them during your visits  First aid:   · Sit up and lean forward: This will help prevent you from swallowing blood  Spit blood and saliva into a bowl  · Apply pressure to your nose:  Use 2 fingers to pinch your nose shut for 10 minutes  This will help stop the bleeding  Breathe through your mouth  · Apply ice:  Use a cold pack or put crushed ice in a bag, cover with a towel, and place on the bridge of your nose  · Nasal packing:  Pack your nose with a cotton ball, tissue, tampon, or gauze bandage to stop the bleeding  Prevent epistaxis:  · Avoid nose picking and blowing your nose too hard: You can irritate or damage your nose if you pick it  Blowing your nose too hard may cause the bleeding to start again  · Avoid irritants:  Substances that can irritate your nose should be avoided  These include tobacco smoke and chemical sprays such as  that contain ammonia  · Use a cool mist humidifier in your home: This will add the moisture to the air and help keep your nose moist      · Put a small amount of petroleum jelly inside your nostrils: You may apply a small amount of petroleum jelly if you do not have a nasal packing  This will help keep your nose from drying out or getting irritated  Do not put anything else inside your nose unless your primary healthcare provider tells you to do so  Contact your primary healthcare provider or otolaryngologist if:   · You have a fever and are vomiting  · You have pain in and around your nose that is getting worse even after you take pain medicines  · Your nasal pack is loose  · You have questions or concerns about your condition or care  Seek care immediately if:   · Your nasal packing is soaked with blood  · Your nose is still bleeding after 20 minutes, even after you pinch it  · You have a foul-smelling discharge coming out of your nose  · You feel so weak and dizzy that you have trouble standing up  · You have trouble breathing or talking  © 2014 [address] [employer] is for End User's use only and may not be sold, redistributed or otherwise used for commercial purposes  All illustrations and images included in CareNotes® are the copyrighted property of [employer]  or [facility]  The above information is an  only  It is not intended as medical advice for individual conditions or treatments  Talk to your doctor, nurse or pharmacist before following any medical regimen to see if it is safe and effective for you

## 2018-02-10 NOTE — ED NOTES
Pt denies CP, denies SOB, denies dizziness  Pt states she took her blood pressure medication at 1630 today        Harsha Blas RN  02/09/18 1932

## 2018-02-10 NOTE — ED ATTENDING ATTESTATION
Jyotsna Beltran MD, saw and evaluated the patient  I have discussed the patient with the resident/non-physician practitioner and agree with the resident's/non-physician practitioner's findings, Plan of Care, and MDM as documented in the resident's/non-physician practitioner's note, except where noted  All available labs and Radiology studies were reviewed  At this point I agree with the current assessment done in the Emergency Department  I have conducted an independent evaluation of this patient a history and physical is as follows:     27-year-old female presents for evaluation of left-sided nosebleed which started 30 minutes prior to arrival   She denies sensation of striking the back of her throat, coughing up blood  She does report that she has a severe frontal headache with this as well  She denies focal neuro deficits or weakness, chest pain, shortness of breath, back/abdominal pain  Ten systems reviewed otherwise negative  On exam no acute distress, HEENT is sick for dried blood in the left anterior naris without visible vessel or active epistaxis, no evidence of posterior epistaxis, neuro normal, neck normal, lungs normal, cardiac normal, abdomen normal  Medical decision making;-anterior epistaxis-will apply direct pressure, Afrin, reassess  Will pack if necessary  There is no history exam findings suggest posterior epistaxis    Hypertension and headache-will CT head rule out CNS bleed, EKG/labs to rule out end-organ damage, reassess for disposition  Critical Care Time  CritCare Time    Procedures

## 2018-02-14 ENCOUNTER — APPOINTMENT (OUTPATIENT)
Dept: LAB | Facility: HOSPITAL | Age: 63
End: 2018-02-14

## 2018-02-14 ENCOUNTER — HOSPITAL ENCOUNTER (EMERGENCY)
Facility: HOSPITAL | Age: 63
Discharge: HOME | End: 2018-02-14
Attending: EMERGENCY MEDICINE | Admitting: EMERGENCY MEDICINE

## 2018-02-14 VITALS
HEIGHT: 69 IN | DIASTOLIC BLOOD PRESSURE: 98 MMHG | OXYGEN SATURATION: 99 % | RESPIRATION RATE: 16 BRPM | SYSTOLIC BLOOD PRESSURE: 182 MMHG | HEART RATE: 84 BPM | BODY MASS INDEX: 30.36 KG/M2 | WEIGHT: 205 LBS | TEMPERATURE: 98.1 F

## 2018-02-14 DIAGNOSIS — N18.30 ANEMIA IN STAGE 3 CHRONIC KIDNEY DISEASE (HCC): ICD-10-CM

## 2018-02-14 DIAGNOSIS — N18.30 TYPE 2 DIABETES MELLITUS WITH STAGE 3 CHRONIC KIDNEY DISEASE, WITHOUT LONG-TERM CURRENT USE OF INSULIN (HCC): ICD-10-CM

## 2018-02-14 DIAGNOSIS — E03.9 ACQUIRED HYPOTHYROIDISM: ICD-10-CM

## 2018-02-14 DIAGNOSIS — E11.22 TYPE 2 DIABETES MELLITUS WITH STAGE 3 CHRONIC KIDNEY DISEASE, WITHOUT LONG-TERM CURRENT USE OF INSULIN (HCC): ICD-10-CM

## 2018-02-14 DIAGNOSIS — D63.1 ANEMIA IN STAGE 3 CHRONIC KIDNEY DISEASE (HCC): ICD-10-CM

## 2018-02-14 DIAGNOSIS — N18.30 CHRONIC KIDNEY DISEASE, STAGE 3 (HCC): ICD-10-CM

## 2018-02-14 DIAGNOSIS — I10 ESSENTIAL HYPERTENSION: ICD-10-CM

## 2018-02-14 DIAGNOSIS — M1A.39X0 CHRONIC GOUT DUE TO RENAL IMPAIRMENT OF MULTIPLE SITES WITHOUT TOPHUS: ICD-10-CM

## 2018-02-14 DIAGNOSIS — M10.9 GOUT FLARE: Primary | ICD-10-CM

## 2018-02-14 LAB
BASOPHILS # BLD AUTO: 0.02 THOUSANDS/ΜL (ref 0–0.1)
BASOPHILS NFR BLD AUTO: 0 % (ref 0–1)
CHOLEST SERPL-MCNC: 199 MG/DL (ref 50–200)
CREAT UR-MCNC: 13.5 MG/DL
EOSINOPHIL # BLD AUTO: 0.39 THOUSAND/ΜL (ref 0–0.61)
EOSINOPHIL NFR BLD AUTO: 4 % (ref 0–6)
ERYTHROCYTE [DISTWIDTH] IN BLOOD BY AUTOMATED COUNT: 13.5 % (ref 11.6–15.1)
EST. AVERAGE GLUCOSE BLD GHB EST-MCNC: 120 MG/DL
HBA1C MFR BLD: 5.8 % (ref 4.2–6.3)
HCT VFR BLD AUTO: 39.1 % (ref 34.8–46.1)
HDLC SERPL-MCNC: 70 MG/DL (ref 40–60)
HGB BLD-MCNC: 12.4 G/DL (ref 11.5–15.4)
LDLC SERPL CALC-MCNC: 112 MG/DL (ref 0–100)
LYMPHOCYTES # BLD AUTO: 1.77 THOUSANDS/ΜL (ref 0.6–4.47)
LYMPHOCYTES NFR BLD AUTO: 20 % (ref 14–44)
MCH RBC QN AUTO: 31.9 PG (ref 26.8–34.3)
MCHC RBC AUTO-ENTMCNC: 31.7 G/DL (ref 31.4–37.4)
MCV RBC AUTO: 101 FL (ref 82–98)
MICROALBUMIN UR-MCNC: 30.2 MG/L (ref 0–20)
MICROALBUMIN/CREAT 24H UR: 224 MG/G CREATININE (ref 0–30)
MONOCYTES # BLD AUTO: 0.76 THOUSAND/ΜL (ref 0.17–1.22)
MONOCYTES NFR BLD AUTO: 8 % (ref 4–12)
NEUTROPHILS # BLD AUTO: 6.16 THOUSANDS/ΜL (ref 1.85–7.62)
NEUTS SEG NFR BLD AUTO: 68 % (ref 43–75)
PLATELET # BLD AUTO: 232 THOUSANDS/UL (ref 149–390)
PMV BLD AUTO: 10.8 FL (ref 8.9–12.7)
RBC # BLD AUTO: 3.89 MILLION/UL (ref 3.81–5.12)
T4 FREE SERPL-MCNC: 1.27 NG/DL (ref 0.76–1.46)
TRIGL SERPL-MCNC: 84 MG/DL
TSH SERPL DL<=0.05 MIU/L-ACNC: 4.67 UIU/ML (ref 0.36–3.74)
URATE SERPL-MCNC: 5.5 MG/DL (ref 2–6.8)
WBC # BLD AUTO: 9.1 THOUSAND/UL (ref 4.31–10.16)

## 2018-02-14 PROCEDURE — 85025 COMPLETE CBC W/AUTO DIFF WBC: CPT

## 2018-02-14 PROCEDURE — 82043 UR ALBUMIN QUANTITATIVE: CPT | Performed by: FAMILY MEDICINE

## 2018-02-14 PROCEDURE — 99283 EMERGENCY DEPT VISIT LOW MDM: CPT

## 2018-02-14 PROCEDURE — 83036 HEMOGLOBIN GLYCOSYLATED A1C: CPT

## 2018-02-14 PROCEDURE — 36415 COLL VENOUS BLD VENIPUNCTURE: CPT

## 2018-02-14 PROCEDURE — 82570 ASSAY OF URINE CREATININE: CPT | Performed by: FAMILY MEDICINE

## 2018-02-14 PROCEDURE — 80061 LIPID PANEL: CPT

## 2018-02-14 PROCEDURE — 84439 ASSAY OF FREE THYROXINE: CPT

## 2018-02-14 PROCEDURE — 84550 ASSAY OF BLOOD/URIC ACID: CPT

## 2018-02-14 PROCEDURE — 84443 ASSAY THYROID STIM HORMONE: CPT

## 2018-02-14 RX ORDER — PREDNISONE 20 MG/1
40 TABLET ORAL DAILY
Qty: 8 TABLET | Refills: 0 | Status: SHIPPED | OUTPATIENT
Start: 2018-02-14 | End: 2018-02-18

## 2018-02-14 RX ORDER — PREDNISONE 20 MG/1
40 TABLET ORAL ONCE
Status: COMPLETED | OUTPATIENT
Start: 2018-02-14 | End: 2018-02-14

## 2018-02-14 RX ADMIN — PREDNISONE 40 MG: 20 TABLET ORAL at 09:57

## 2018-02-14 NOTE — DISCHARGE INSTRUCTIONS
Gout   WHAT YOU NEED TO KNOW:   Gout is a form of arthritis that causes severe joint pain, redness, swelling, and stiffness  Acute gout pain starts suddenly, gets worse quickly, and stops on its own  Acute gout can become chronic and cause permanent damage to the joints  DISCHARGE INSTRUCTIONS:   Return to the emergency department if:   · You have severe pain in one or more of your joints that you cannot tolerate  · You have a fever or redness that spreads beyond the joint area  Contact your healthcare provider if:   · You have new symptoms, such as a rash, after you start gout treatment  · Your joint pain and swelling do not go away, even after treatment  · You are not urinating as much or as often as you usually do  · You have trouble taking your gout medicines  · You have questions or concerns about your condition or care  Medicines: You may need any of the following:  · Prescription pain medicine  may be given  Ask your healthcare provider how to take this medicine safely  Some prescription pain medicines contain acetaminophen  Do not take other medicines that contain acetaminophen without talking to your healthcare provider  Too much acetaminophen may cause liver damage  Prescription pain medicine may cause constipation  Ask your healthcare provider how to prevent or treat constipation  · NSAIDs , such as ibuprofen, help decrease swelling, pain, and fever  This medicine is available with or without a doctor's order  NSAIDs can cause stomach bleeding or kidney problems in certain people  If you take blood thinner medicine, always ask your healthcare provider if NSAIDs are safe for you  Always read the medicine label and follow directions  · Gout medicine  decreases joint pain and swelling  It may also be given to prevent new gout attacks  · Steroids  reduce inflammation and can help your joint stiffness and pain during gout attacks      · Uric acid medicine  may be given to reduce the amount of uric acid your body makes  Some medicines may help you pass more uric acid when you urinate  · Take your medicine as directed  Contact your healthcare provider if you think your medicine is not helping or if you have side effects  Tell him or her if you are allergic to any medicine  Keep a list of the medicines, vitamins, and herbs you take  Include the amounts, and when and why you take them  Bring the list or the pill bottles to follow-up visits  Carry your medicine list with you in case of an emergency  Follow up with your healthcare provider as directed:  Write down your questions so you remember to ask them during your visits  Manage gout:   · Rest your painful joint so it can heal   Your healthcare provider may recommend crutches or a walker if the affected joint is in a leg  · Apply ice to your joint  Ice decreases pain and swelling  Use an ice pack, or put crushed ice in a plastic bag  Cover the ice pack or bag with a towel before you apply it to your painful joint  Apply ice for 15 to 20 minutes every hour, or as directed  · Elevate your joint  Elevation helps reduce swelling and pain  Raise your joint above the level of your heart as often as you can  Prop your painful joint on pillows to keep it above your heart comfortably  · Go to physical therapy if directed  A physical therapist can teach you exercises to improve flexibility and range of motion  Prevent gout attacks:   · Do not eat high-purine foods  These foods include meats, seafood, asparagus, spinach, cauliflower, and some types of beans  Healthcare providers may tell you to eat more low-fat milk products, such as yogurt  Milk products may decrease your risk for gout attacks  Vitamin C and coffee may also help  Your healthcare provider or dietitian can help you create a meal plan  · Drink more liquids as directed  Liquids such as water help remove uric acid from your body   Ask how much liquid to drink each day and which liquids are best for you  · Manage your weight  Weight loss may decrease the amount of uric acid in your body  Exercise can help you lose weight  Talk to your healthcare provider about the best exercises for you  · Control your blood sugar level if you have diabetes  Keep your blood sugar level in a normal range  This can help prevent gout attacks  · Limit or do not drink alcohol as directed  Alcohol can trigger a gout attack  Alcohol also increases your risk for dehydration  Ask your healthcare provider if alcohol is safe for you  © 2017 [address] [facility] Information is for End User's use only and may not be sold, redistributed or otherwise used for commercial purposes  All illustrations and images included in CareNotes® are the copyrighted property of [employer]  or [employer]  The above information is an  only  It is not intended as medical advice for individual conditions or treatments  Talk to your doctor, nurse or pharmacist before following any medical regimen to see if it is safe and effective for you

## 2018-02-14 NOTE — ED PROVIDER NOTES
History  Chief Complaint   Patient presents with    Hand Swelling     patient reports pain and swelling to right hand and elbow  reports hx of gout  began yesterday, but worst today, having diffiulty grasping items  History provided by:  Patient   used: No    Hand Pain   Location:  Right hand and elbow  Quality:  Pain and swelling  Severity:  Moderate  Onset quality:  Gradual  Duration:  2 days  Timing:  Intermittent  Progression:  Unchanged  Chronicity:  Recurrent  Context:  History of gout, on allopurinol, comes in with swelling and pain of right  Relieved by:  Nothing  Worsened by: Movement  Ineffective treatments:  None  Associated symptoms: no abdominal pain, no chest pain, no cough, no diarrhea, no fever, no headaches, no loss of consciousness, no nausea, no rash, no shortness of breath, no sore throat and no vomiting    Risk factors:  DM, Gout      Prior to Admission Medications   Prescriptions Last Dose Informant Patient Reported? Taking? ACCU-CHEK FASTCLIX LANCETS MISC   Yes No   Sig: by Does not apply route 2 (two) times a day   albuterol (PROVENTIL HFA,VENTOLIN HFA) 90 mcg/act inhaler   Yes Yes   Sig: Inhale 2 puffs every 6 (six) hours as needed for wheezing     allopurinol (ZYLOPRIM) 300 mg tablet   No Yes   Sig: Take 1 tablet (300 mg total) by mouth daily   furosemide (LASIX) 40 mg tablet   No Yes   Sig: Take 1 tablet (40 mg total) by mouth 2 (two) times a day   gabapentin (NEURONTIN) 400 mg capsule   No Yes   Sig: TAKE ONE CAPSULE BY MOUTH THREE TIMES DAILY   glucose blood (ACCU-CHEK SMARTVIEW) test strip   Yes Yes   Sig: by In Vitro route 2 (two) times a day   hydrALAZINE (APRESOLINE) 25 mg tablet   No Yes   Sig: Take 1 tablet (25 mg total) by mouth 3 (three) times a day   levothyroxine 150 mcg tablet   No Yes   Sig: Take 1 tablet (150 mcg total) by mouth daily   metFORMIN (GLUCOPHAGE) 500 mg tablet   No Yes   Sig: Take 1 tablet (500 mg total) by mouth 2 (two) times a day with meals      Facility-Administered Medications: None       Past Medical History:   Diagnosis Date    Acute renal failure (ARF) (HCC)     Anemia     Asthma     Atopic dermatitis     Chronic kidney disease     Stage 3    Diabetes mellitus (HCC)     type 2    Disease of thyroid gland     hypothyroidism    Edema of both legs     Gout     H/O colonoscopy     H/O mammogram     Hyperlipidemia     Hypertension     Nephrosclerosis     Pain in unspecified foot     Peripheral neuropathy     Phlebitis     Venous stasis dermatitis of both lower extremities     Wrist joint pain     LEFT       Past Surgical History:   Procedure Laterality Date    CERVIX SURGERY      COLONOSCOPY N/A 5/31/2016    Procedure: COLONOSCOPY;  Surgeon: Yomaira Levi MD;  Location: [facility]; Service:     VEIN SURGERY         Family History   Problem Relation Age of Onset    Diabetes Mother     Heart attack Father      I have reviewed and agree with the history as documented  Social History   Substance Use Topics    Smoking status: Current Every Day Smoker     Packs/day: 0 50    Smokeless tobacco: Never Used      Comment: 30    Alcohol use No      Comment: social        Review of Systems   Constitutional: Negative for activity change, chills and fever  HENT: Negative for facial swelling, sore throat and trouble swallowing  Eyes: Negative for pain and visual disturbance  Respiratory: Negative for cough, chest tightness and shortness of breath  Cardiovascular: Negative for chest pain and leg swelling  Gastrointestinal: Negative for abdominal pain, blood in stool, diarrhea, nausea and vomiting  Genitourinary: Negative for dysuria and flank pain  Musculoskeletal: Positive for arthralgias  Negative for back pain, neck pain and neck stiffness  Right hand and elbow pain and swelling   Skin: Negative for pallor and rash     Allergic/Immunologic: Negative for environmental allergies and immunocompromised state  Neurological: Negative for dizziness, loss of consciousness and headaches  Hematological: Negative for adenopathy  Does not bruise/bleed easily  Psychiatric/Behavioral: Negative for agitation and behavioral problems  Physical Exam  ED Triage Vitals [02/14/18 0827]   Temperature Pulse Respirations Blood Pressure SpO2   98 1 °F (36 7 °C) 84 16 (!) 182/98 99 %      Temp Source Heart Rate Source Patient Position - Orthostatic VS BP Location FiO2 (%)   Oral Monitor Sitting Left arm --      Pain Score       Worst Possible Pain           Orthostatic Vital Signs  Vitals:    02/14/18 0827   BP: (!) 182/98   Pulse: 84   Patient Position - Orthostatic VS: Sitting       Physical Exam   Constitutional: She is oriented to person, place, and time  She appears well-developed and well-nourished  No distress  HENT:   Head: Normocephalic and atraumatic  Eyes: EOM are normal    Neck: Normal range of motion  Neck supple  Cardiovascular: Normal rate, regular rhythm, normal heart sounds and intact distal pulses  Pulmonary/Chest: Effort normal and breath sounds normal    Abdominal: Soft  Bowel sounds are normal  There is no tenderness  There is no rebound and no guarding  Musculoskeletal: She exhibits tenderness  Patient has tenderness and swelling to right elbow laterally and dorsum of right hand, no erythema or fluctuance, swelling of the hand is prominent on the metacarpophalangeal joints dorsally, neurovascular intact distally   Neurological: She is alert and oriented to person, place, and time  Skin: Skin is warm and dry  Psychiatric: She has a normal mood and affect  Nursing note and vitals reviewed        ED Medications  Medications   predniSONE tablet 40 mg (40 mg Oral Given 2/14/18 0957)       Diagnostic Studies  Results Reviewed     None                 No orders to display              Procedures  Procedures       Phone Contacts  ED Phone Contact    ED Course  ED Course as of Feb 14 1048 Wed Feb 14, 2018   0935 Labs done today outpatient, CBC result back _wnl  MDM  Number of Diagnoses or Management Options  Gout flare: new and does not require workup  Diagnosis management comments: Patient is a 62-year-old female, history of diabetes, gout on allopurinol, comes in with possible gout flare, involving right elbow and right hand, no injuries, has had an similar symptoms in the past, was presumably treated with colchicine couple of years back but patient recalls now that it was too expensive and she 'possibly did not fill it'  Exam shows swelling of the right elbow laterally and right hand dorsally, no erythema or fluctuance, neurovascular intact distally  Presentation consistent with gout flare, patient has chronic kidney disease, discussed the short course of colchicine worse since she prednisone, patient opts for prednisone, nursed and that it may increase her sugars during the course, advise close monitoring and follow up with family doctor  CritCare Time    Disposition  Final diagnoses:   Gout flare     Time reflects when diagnosis was documented in both MDM as applicable and the Disposition within this note     Time User Action Codes Description Comment    2/14/2018  9:42 AM Marcella Allen [M10 [id number]] Gout flare       ED Disposition     ED Disposition Condition Comment    Discharge  [facility] discharge to home/self care  Condition at discharge: Stable        Follow-up Information     Follow up With Specialties Details Why [address], DO Family Medicine   [address]  [phone]          It is important to follow up with family doctor          Discharge Medication List as of 2/14/2018  9:57 AM      START taking these medications    Details   predniSONE 20 mg tablet Take 2 tablets (40 mg total) by mouth daily for 4 days, Starting Wed 2/14/2018, Until Sun 2/18/2018, Print CONTINUE these medications which have NOT CHANGED    Details   albuterol (PROVENTIL HFA,VENTOLIN HFA) 90 mcg/act inhaler Inhale 2 puffs every 6 (six) hours as needed for wheezing , Until Discontinued, Historical Med      allopurinol (ZYLOPRIM) 300 mg tablet Take 1 tablet (300 mg total) by mouth daily, Starting Tue 2/6/2018, Normal      furosemide (LASIX) 40 mg tablet Take 1 tablet (40 mg total) by mouth 2 (two) times a day, Starting Tue 2/6/2018, Normal      gabapentin (NEURONTIN) 400 mg capsule TAKE ONE CAPSULE BY MOUTH THREE TIMES DAILY, Normal      glucose blood (ACCU-CHEK SMARTVIEW) test strip by In Vitro route 2 (two) times a day, Historical Med      hydrALAZINE (APRESOLINE) 25 mg tablet Take 1 tablet (25 mg total) by mouth 3 (three) times a day, Starting Tue 2/6/2018, Normal      levothyroxine 150 mcg tablet Take 1 tablet (150 mcg total) by mouth daily, Starting Tue 2/6/2018, Normal      metFORMIN (GLUCOPHAGE) 500 mg tablet Take 1 tablet (500 mg total) by mouth 2 (two) times a day with meals, Starting Tue 2/6/2018, Normal      ACCU-CHEK FASTCLIX LANCETS MISC by Does not apply route 2 (two) times a day, Historical Med           No discharge procedures on file      ED Provider  Electronically Signed by           Jeimy Ramos MD  02/14/18 2103

## 2018-02-20 ENCOUNTER — CLINICAL SUPPORT (OUTPATIENT)
Dept: FAMILY MEDICINE CLINIC | Facility: CLINIC | Age: 63
End: 2018-02-20

## 2018-02-20 VITALS — SYSTOLIC BLOOD PRESSURE: 190 MMHG | DIASTOLIC BLOOD PRESSURE: 90 MMHG

## 2018-02-20 DIAGNOSIS — I10 ESSENTIAL HYPERTENSION: Primary | ICD-10-CM

## 2018-02-20 RX ORDER — HYDRALAZINE HYDROCHLORIDE 50 MG/1
50 TABLET, FILM COATED ORAL 3 TIMES DAILY
Qty: 90 TABLET | Refills: 5 | Status: SHIPPED | OUTPATIENT
Start: 2018-02-20 | End: 2018-05-10 | Stop reason: SDUPTHER

## 2018-02-20 NOTE — PROGRESS NOTES
Assessment/Plan:      There are no diagnoses linked to this encounter  Subjective:  Chief Complaint   Patient presents with    Blood Pressure Check    Hypertension          Patient ID: Magalie Lauren is a 63 y o  female      HPI    Review of Systems      Objective:     Physical Exam  /90

## 2018-02-20 NOTE — PROGRESS NOTES
Assessment/Plan:      There are no diagnoses linked to this encounter  Subjective  cc       Patient ID: Magalie Lauren is a 63 y o  female      HPI    Review of Systems      Objective:     Physical Exam

## 2018-03-06 ENCOUNTER — OFFICE VISIT (OUTPATIENT)
Dept: FAMILY MEDICINE CLINIC | Facility: CLINIC | Age: 63
End: 2018-03-06
Payer: COMMERCIAL

## 2018-03-06 VITALS
DIASTOLIC BLOOD PRESSURE: 90 MMHG | BODY MASS INDEX: 31.04 KG/M2 | HEIGHT: 69 IN | SYSTOLIC BLOOD PRESSURE: 160 MMHG | WEIGHT: 209.6 LBS

## 2018-03-06 DIAGNOSIS — N18.30 CHRONIC KIDNEY DISEASE, STAGE 3 (HCC): ICD-10-CM

## 2018-03-06 DIAGNOSIS — N18.30 TYPE 2 DIABETES MELLITUS WITH STAGE 3 CHRONIC KIDNEY DISEASE, WITHOUT LONG-TERM CURRENT USE OF INSULIN (HCC): ICD-10-CM

## 2018-03-06 DIAGNOSIS — E11.22 TYPE 2 DIABETES MELLITUS WITH STAGE 3 CHRONIC KIDNEY DISEASE, WITHOUT LONG-TERM CURRENT USE OF INSULIN (HCC): ICD-10-CM

## 2018-03-06 DIAGNOSIS — I10 ESSENTIAL HYPERTENSION: Primary | ICD-10-CM

## 2018-03-06 DIAGNOSIS — M25.562 ACUTE PAIN OF LEFT KNEE: ICD-10-CM

## 2018-03-06 PROCEDURE — 99213 OFFICE O/P EST LOW 20 MIN: CPT | Performed by: FAMILY MEDICINE

## 2018-03-06 RX ORDER — PREDNISONE 10 MG/1
TABLET ORAL
Qty: 30 TABLET | Refills: 0 | Status: SHIPPED | OUTPATIENT
Start: 2018-03-06 | End: 2018-10-23 | Stop reason: ALTCHOICE

## 2018-03-06 RX ORDER — KETOROLAC TROMETHAMINE 30 MG/ML
30 INJECTION, SOLUTION INTRAMUSCULAR; INTRAVENOUS ONCE
Status: SHIPPED | OUTPATIENT
Start: 2018-03-06 | End: 2018-03-11

## 2018-03-06 NOTE — ASSESSMENT & PLAN NOTE
Patient blood pressure is elevated today However she had 8 out of 10 knee pain also Will treat knee pain and recheck knee and blood pressure in 2 weeks

## 2018-03-06 NOTE — PROGRESS NOTES
Assessment/Plan:    Type 2 diabetes mellitus with stage 3 chronic kidney disease, without long-term current use of insulin (HCC)  A1c excellent at 5 8 Continue currrent care    Essential hypertension  Patient blood pressure is elevated today However she had 8 out of 10 knee pain also Will treat knee pain and recheck knee and blood pressure in 2 weeks    Chronic kidney disease, stage 3  Slight worsening of kidney function I will refer patient back to Dr Vrigilio Soriano    Acute pain of left knee  Strain/sprain due to overuse Patient will use ice 20 mintues 4 times per day for next 48 hours Patient will get work excuse Patient will have shot tordol now and will take the prednisone as directed patient to followup in 2 weeks       Diagnoses and all orders for this visit:    Essential hypertension    Type 2 diabetes mellitus with stage 3 chronic kidney disease, without long-term current use of insulin (HCC)    Chronic kidney disease, stage 3    Acute pain of left knee          Subjective:   Chief Complaint   Patient presents with    Hypertension    Diabetes    Hyperlipidemia    Knee Pain     left  x  2 days           Patient ID: Keena Rogers is a 63 y o  female  Patient is ehre for followup of her hypertension, diabetes and also her renal disease Patient is unfortunately having sever left knee pain for last 2 days 9 out of 10 pain She had some very busy/estreme shifts at work She is  Patient states no injury and did not take anything for it Patient is having difficulty bearing weight due to pain No prior knee injusy  This pain is likely effecting her blood pressure She states it had been doing better at home Patient last labs show slight worsening of renal function Patient needs to see Dr Virgilio Soriano she is working on her insurance issues patient also is hving left sided nose bleeds on and off for last one month  They stop with pressure in about 5 mintues       Knee Pain    The incident occurred 2 days ago   The incident occurred at work  There was no injury mechanism  The pain is present in the left knee  The quality of the pain is described as shooting  The pain is at a severity of 9/10  The pain is severe  The pain has been constant since onset  Associated symptoms include a loss of motion  Pertinent negatives include no inability to bear weight, loss of sensation, muscle weakness, numbness or tingling  She reports no foreign bodies present  The symptoms are aggravated by movement and weight bearing  She has tried nothing for the symptoms  The following portions of the patient's history were reviewed and updated as appropriate: allergies, current medications, past social history and problem list     Review of Systems   Constitutional: Negative for fatigue, fever and unexpected weight change  HENT: Negative for congestion, sinus pain and trouble swallowing  Eyes: Negative for discharge and visual disturbance  Respiratory: Negative for cough, chest tightness, shortness of breath and wheezing  Cardiovascular: Negative for chest pain, palpitations and leg swelling  Gastrointestinal: Negative for abdominal pain, blood in stool, constipation, diarrhea, nausea and vomiting  Genitourinary: Negative for difficulty urinating, dysuria, frequency and hematuria  Musculoskeletal: Positive for arthralgias  Negative for gait problem and joint swelling  Left knee pain   Skin: Negative for rash and wound  Allergic/Immunologic: Negative for environmental allergies and food allergies  Neurological: Negative for dizziness, tingling, syncope, weakness, numbness and headaches  Hematological: Negative for adenopathy  Does not bruise/bleed easily  Psychiatric/Behavioral: Negative for confusion, decreased concentration and sleep disturbance  The patient is not nervous/anxious            Objective:      /90   Ht 5' 9" (1 753 m)   Wt 95 1 kg (209 lb 9 6 oz)   BMI 30 95 kg/m²          Physical Exam Constitutional: She is oriented to person, place, and time  She appears well-developed and well-nourished  HENT:   Nose: Mucosal edema present  Epistaxis is observed  Left sinus exhibits no maxillary sinus tenderness and no frontal sinus tenderness  Eyes: Conjunctivae and EOM are normal  Pupils are equal, round, and reactive to light  Neck: Normal range of motion  Cardiovascular: Normal rate, regular rhythm and normal heart sounds  Pulmonary/Chest: Effort normal and breath sounds normal  No respiratory distress  She has no wheezes  She has no rales  Abdominal: Soft  Bowel sounds are normal    Musculoskeletal:        Left knee: She exhibits decreased range of motion  She exhibits no swelling, no effusion, no ecchymosis, no deformity, no laceration, no erythema, normal alignment, no LCL laxity, normal patellar mobility, no bony tenderness and no MCL laxity  Tenderness found  Medial joint line and lateral joint line tenderness noted  No MCL, no LCL and no patellar tendon tenderness noted  Legs:  Neurological: She is alert and oriented to person, place, and time  She has normal reflexes  No cranial nerve deficit  Skin: No rash noted  Psychiatric: She has a normal mood and affect   Her behavior is normal  Judgment normal

## 2018-03-06 NOTE — ASSESSMENT & PLAN NOTE
Strain/sprain due to overuse Patient will use ice 20 mintues 4 times per day for next 48 hours Patient will get work excuse Patient will have shot tordol now and will take the prednisone as directed patient to followup in 2 weeks

## 2018-05-10 DIAGNOSIS — I10 ESSENTIAL HYPERTENSION: ICD-10-CM

## 2018-05-10 RX ORDER — HYDRALAZINE HYDROCHLORIDE 50 MG/1
50 TABLET, FILM COATED ORAL 3 TIMES DAILY
Qty: 90 TABLET | Refills: 0 | Status: SHIPPED | OUTPATIENT
Start: 2018-05-10 | End: 2018-06-22 | Stop reason: SDUPTHER

## 2018-06-22 DIAGNOSIS — I10 ESSENTIAL HYPERTENSION: ICD-10-CM

## 2018-06-22 RX ORDER — HYDRALAZINE HYDROCHLORIDE 50 MG/1
50 TABLET, FILM COATED ORAL 3 TIMES DAILY
Qty: 90 TABLET | Refills: 3 | Status: SHIPPED | OUTPATIENT
Start: 2018-06-22 | End: 2018-10-23 | Stop reason: SDUPTHER

## 2018-07-06 ENCOUNTER — APPOINTMENT (EMERGENCY)
Dept: RADIOLOGY | Facility: HOSPITAL | Age: 63
End: 2018-07-06
Payer: COMMERCIAL

## 2018-07-06 ENCOUNTER — HOSPITAL ENCOUNTER (EMERGENCY)
Facility: HOSPITAL | Age: 63
Discharge: HOME | End: 2018-07-06
Attending: EMERGENCY MEDICINE | Admitting: EMERGENCY MEDICINE
Payer: COMMERCIAL

## 2018-07-06 VITALS
TEMPERATURE: 98.3 F | DIASTOLIC BLOOD PRESSURE: 86 MMHG | HEART RATE: 80 BPM | RESPIRATION RATE: 16 BRPM | OXYGEN SATURATION: 97 % | SYSTOLIC BLOOD PRESSURE: 175 MMHG

## 2018-07-06 DIAGNOSIS — S83.92XA LEFT KNEE SPRAIN: Primary | ICD-10-CM

## 2018-07-06 PROCEDURE — 99283 EMERGENCY DEPT VISIT LOW MDM: CPT

## 2018-07-06 PROCEDURE — 73560 X-RAY EXAM OF KNEE 1 OR 2: CPT

## 2018-07-06 RX ORDER — NAPROXEN 500 MG/1
500 TABLET ORAL 2 TIMES DAILY WITH MEALS
Qty: 20 TABLET | Refills: 0 | Status: SHIPPED | OUTPATIENT
Start: 2018-07-06 | End: 2019-06-26

## 2018-07-06 NOTE — DISCHARGE INSTRUCTIONS
Knee Sprain   WHAT YOU NEED TO KNOW:   A knee sprain occurs when one or more ligaments in your knee are suddenly stretched or torn  Ligaments are tissues that hold bones together  Ligaments support the knee and keep the joint and bones in the correct position  DISCHARGE INSTRUCTIONS:   Return to the emergency department if:   · Any part of your leg feels cold, numb, or looks pale     Contact your healthcare provider if:   · You have new or increased swelling, bruising, or pain in your knee  · Your symptoms do not improve within 6 weeks, even with treatment  · You have questions or concerns about your condition or care  Medicines:   · NSAIDs , such as ibuprofen, help decrease swelling, pain, and fever  This medicine is available with or without a doctor's order  NSAIDs can cause stomach bleeding or kidney problems in certain people  If you take blood thinner medicine, always ask your healthcare provider if NSAIDs are safe for you  Always read the medicine label and follow directions  · Acetaminophen  decreases pain and fever  It is available without a doctor's order  Ask how much to take and how often to take it  Follow directions  Read the labels of all other medicines you are using to see if they also contain acetaminophen, or ask your doctor or pharmacist  Acetaminophen can cause liver damage if not taken correctly  Do not use more than 4 grams (4,000 milligrams) total of acetaminophen in one day  · Prescription pain medicine  may be given  Ask how to take this medicine safely  · Take your medicine as directed  Contact your healthcare provider if you think your medicine is not helping or if you have side effects  Tell him or her if you are allergic to any medicine  Keep a list of the medicines, vitamins, and herbs you take  Include the amounts, and when and why you take them  Bring the list or the pill bottles to follow-up visits   Carry your medicine list with you in case of an emergency  Self-care:   · Rest  your knee and do not exercise  You may be told to keep weight off your knee  This means that you should not walk on your injured leg  Rest helps decrease swelling and allows the injury to heal  You can do gentle range of motion (ROM) exercises as directed  This will prevent stiffness  · Apply ice  on your knee for 15 to 20 minutes every hour or as directed  Use an ice pack, or put crushed ice in a plastic bag  Cover it with a towel  Ice helps prevent tissue damage and decreases swelling and pain  · Apply compression to your knee as directed  You may need to wear an elastic bandage  This helps keep your injured knee from moving too much while it heals  You can loosen or tighten the elastic bandage to make it comfortable  It should be tight enough for you to feel support  It should not be so tight that it causes your toes to feel numb or tingly  If you are wearing an elastic bandage, take it off and rewrap it once a day  · Elevate your knee  above the level of your heart as often as you can  This will help decrease swelling and pain  Prop your leg on pillows or blankets to keep it elevated comfortably  Do not put pillows directly behind your knee  · Use support devices as directed:  Support devices such as a splint or brace may be needed  These devices limit movement and protect your joint while it heals  You may be given crutches to use until you can stand on your injured leg without pain  Use devices as directed  Physical therapy:  A physical therapist teaches you exercises to help improve movement and strength, and to decrease pain  Prevent another knee sprain:  Exercise your legs to keep your muscles strong  Strong leg muscles help protect your knee and prevent strain  The following may also prevent a knee sprain:  · Slowly start your exercise or training program   Slowly increase the time, distance, and intensity of your exercise   Sudden increases in training may cause you to injure your knee again  · Wear protective braces and equipment as directed  Braces may prevent your knee from moving the wrong way and causing another sprain  Protective equipment may support your bones and ligaments to prevent injury  · Warm up and stretch before exercise  Warm up by walking or using an exercise bike before starting your regular exercise  Do gentle stretches after warming up  This helps to loosen your muscles and decrease stress on your knee  Cool down and stretch after you exercise  · Wear shoes that fit correctly and support your feet  Replace your running or exercise shoes before the padding or shock absorption is worn out  Ask your healthcare provider which exercise shoes are best for you  Ask if you should wear special shoe inserts  Shoe inserts can help support your heels and arches or keep your foot lined up correctly in your shoes  Exercise on flat surfaces  Follow up with your healthcare provider as directed:  Write down your questions so you remember to ask them during your visits  © 2017 [facility] Information is for End User's use only and may not be sold, redistributed or otherwise used for commercial purposes  All illustrations and images included in CareNotes® are the copyrighted property of [employer] , [employer]  or [employer]  The above information is an  only  It is not intended as medical advice for individual conditions or treatments  Talk to your doctor, nurse or pharmacist before following any medical regimen to see if it is safe and effective for you

## 2018-07-18 DIAGNOSIS — N18.30 TYPE 2 DIABETES MELLITUS WITH STAGE 3 CHRONIC KIDNEY DISEASE, WITHOUT LONG-TERM CURRENT USE OF INSULIN (HCC): ICD-10-CM

## 2018-07-18 DIAGNOSIS — E11.22 TYPE 2 DIABETES MELLITUS WITH STAGE 3 CHRONIC KIDNEY DISEASE, WITHOUT LONG-TERM CURRENT USE OF INSULIN (HCC): ICD-10-CM

## 2018-09-11 DIAGNOSIS — E03.9 ACQUIRED HYPOTHYROIDISM: ICD-10-CM

## 2018-09-11 DIAGNOSIS — J45.40 MODERATE PERSISTENT ASTHMA, UNSPECIFIED WHETHER COMPLICATED: Primary | ICD-10-CM

## 2018-09-11 DIAGNOSIS — N18.30 CHRONIC KIDNEY DISEASE, STAGE 3 (HCC): ICD-10-CM

## 2018-09-11 DIAGNOSIS — I10 ESSENTIAL HYPERTENSION: ICD-10-CM

## 2018-09-11 RX ORDER — ALBUTEROL SULFATE 90 UG/1
2 AEROSOL, METERED RESPIRATORY (INHALATION) EVERY 6 HOURS PRN
Qty: 1 INHALER | Refills: 0 | Status: SHIPPED | OUTPATIENT
Start: 2018-09-11 | End: 2020-01-07

## 2018-09-11 RX ORDER — FUROSEMIDE 40 MG/1
40 TABLET ORAL 2 TIMES DAILY
Qty: 60 TABLET | Refills: 0 | Status: SHIPPED | OUTPATIENT
Start: 2018-09-11 | End: 2018-10-23 | Stop reason: SDUPTHER

## 2018-09-11 RX ORDER — LEVOTHYROXINE SODIUM 0.15 MG/1
150 TABLET ORAL DAILY
Qty: 30 TABLET | Refills: 0 | Status: SHIPPED | OUTPATIENT
Start: 2018-09-11 | End: 2018-10-15 | Stop reason: SDUPTHER

## 2018-10-12 DIAGNOSIS — G60.9 IDIOPATHIC PERIPHERAL NEUROPATHY: ICD-10-CM

## 2018-10-12 DIAGNOSIS — E03.9 ACQUIRED HYPOTHYROIDISM: ICD-10-CM

## 2018-10-12 DIAGNOSIS — M1A.39X0 CHRONIC GOUT DUE TO RENAL IMPAIRMENT OF MULTIPLE SITES WITHOUT TOPHUS: ICD-10-CM

## 2018-10-15 DIAGNOSIS — G60.9 IDIOPATHIC PERIPHERAL NEUROPATHY: ICD-10-CM

## 2018-10-15 DIAGNOSIS — M1A.39X0 CHRONIC GOUT DUE TO RENAL IMPAIRMENT OF MULTIPLE SITES WITHOUT TOPHUS: ICD-10-CM

## 2018-10-15 DIAGNOSIS — E03.9 ACQUIRED HYPOTHYROIDISM: ICD-10-CM

## 2018-10-15 RX ORDER — LEVOTHYROXINE SODIUM 0.15 MG/1
150 TABLET ORAL DAILY
Qty: 30 TABLET | Refills: 0 | Status: SHIPPED | OUTPATIENT
Start: 2018-10-15 | End: 2018-10-23 | Stop reason: SDUPTHER

## 2018-10-15 RX ORDER — ALLOPURINOL 300 MG/1
300 TABLET ORAL DAILY
Qty: 30 TABLET | Refills: 0 | Status: SHIPPED | OUTPATIENT
Start: 2018-10-15 | End: 2018-10-23 | Stop reason: SDUPTHER

## 2018-10-15 RX ORDER — GABAPENTIN 400 MG/1
400 CAPSULE ORAL 3 TIMES DAILY
Qty: 90 CAPSULE | Refills: 0 | Status: SHIPPED | OUTPATIENT
Start: 2018-10-15 | End: 2018-10-23 | Stop reason: SDUPTHER

## 2018-10-15 RX ORDER — ALLOPURINOL 300 MG/1
300 TABLET ORAL DAILY
Qty: 30 TABLET | Refills: 0 | OUTPATIENT
Start: 2018-10-15

## 2018-10-15 RX ORDER — LEVOTHYROXINE SODIUM 0.15 MG/1
150 TABLET ORAL DAILY
Qty: 30 TABLET | Refills: 3 | OUTPATIENT
Start: 2018-10-15

## 2018-10-15 RX ORDER — GABAPENTIN 400 MG/1
400 CAPSULE ORAL 3 TIMES DAILY
Qty: 90 CAPSULE | Refills: 0 | OUTPATIENT
Start: 2018-10-15

## 2018-10-23 ENCOUNTER — TELEPHONE (OUTPATIENT)
Dept: FAMILY MEDICINE CLINIC | Facility: CLINIC | Age: 63
End: 2018-10-23

## 2018-10-23 ENCOUNTER — OFFICE VISIT (OUTPATIENT)
Dept: FAMILY MEDICINE CLINIC | Facility: CLINIC | Age: 63
End: 2018-10-23
Payer: COMMERCIAL

## 2018-10-23 VITALS
BODY MASS INDEX: 30.18 KG/M2 | DIASTOLIC BLOOD PRESSURE: 88 MMHG | HEIGHT: 69 IN | WEIGHT: 203.8 LBS | SYSTOLIC BLOOD PRESSURE: 150 MMHG

## 2018-10-23 DIAGNOSIS — J45.40 MODERATE PERSISTENT ASTHMA WITHOUT COMPLICATION: ICD-10-CM

## 2018-10-23 DIAGNOSIS — N18.30 TYPE 2 DIABETES MELLITUS WITH STAGE 3 CHRONIC KIDNEY DISEASE, WITHOUT LONG-TERM CURRENT USE OF INSULIN (HCC): Primary | ICD-10-CM

## 2018-10-23 DIAGNOSIS — E78.2 MIXED HYPERLIPIDEMIA: ICD-10-CM

## 2018-10-23 DIAGNOSIS — E03.9 ACQUIRED HYPOTHYROIDISM: ICD-10-CM

## 2018-10-23 DIAGNOSIS — I10 ESSENTIAL HYPERTENSION: ICD-10-CM

## 2018-10-23 DIAGNOSIS — E11.22 TYPE 2 DIABETES MELLITUS WITH STAGE 3 CHRONIC KIDNEY DISEASE, WITHOUT LONG-TERM CURRENT USE OF INSULIN (HCC): Primary | ICD-10-CM

## 2018-10-23 DIAGNOSIS — N18.30 CHRONIC KIDNEY DISEASE, STAGE 3 (HCC): ICD-10-CM

## 2018-10-23 DIAGNOSIS — G60.9 IDIOPATHIC PERIPHERAL NEUROPATHY: ICD-10-CM

## 2018-10-23 DIAGNOSIS — Z12.39 SCREENING BREAST EXAMINATION: ICD-10-CM

## 2018-10-23 DIAGNOSIS — E66.09 CLASS 1 OBESITY DUE TO EXCESS CALORIES WITH SERIOUS COMORBIDITY AND BODY MASS INDEX (BMI) OF 33.0 TO 33.9 IN ADULT: ICD-10-CM

## 2018-10-23 DIAGNOSIS — M1A.39X0 CHRONIC GOUT DUE TO RENAL IMPAIRMENT OF MULTIPLE SITES WITHOUT TOPHUS: ICD-10-CM

## 2018-10-23 PROBLEM — M25.562 ACUTE PAIN OF LEFT KNEE: Status: RESOLVED | Noted: 2018-03-06 | Resolved: 2018-10-23

## 2018-10-23 PROCEDURE — 99214 OFFICE O/P EST MOD 30 MIN: CPT | Performed by: FAMILY MEDICINE

## 2018-10-23 PROCEDURE — 3008F BODY MASS INDEX DOCD: CPT | Performed by: FAMILY MEDICINE

## 2018-10-23 RX ORDER — GABAPENTIN 400 MG/1
400 CAPSULE ORAL 3 TIMES DAILY
Qty: 90 CAPSULE | Refills: 5 | Status: SHIPPED | OUTPATIENT
Start: 2018-10-23 | End: 2019-06-05 | Stop reason: SDUPTHER

## 2018-10-23 RX ORDER — TOPIRAMATE 25 MG/1
TABLET ORAL
COMMUNITY
Start: 2018-10-02 | End: 2019-06-26 | Stop reason: ALTCHOICE

## 2018-10-23 RX ORDER — ALBUTEROL SULFATE 90 UG/1
2 AEROSOL, METERED RESPIRATORY (INHALATION) EVERY 6 HOURS PRN
Qty: 18 G | Refills: 5 | Status: SHIPPED | OUTPATIENT
Start: 2018-10-23 | End: 2019-06-26

## 2018-10-23 RX ORDER — LEVOTHYROXINE SODIUM 0.15 MG/1
150 TABLET ORAL DAILY
Qty: 30 TABLET | Refills: 5 | Status: SHIPPED | OUTPATIENT
Start: 2018-10-23 | End: 2019-01-16 | Stop reason: SDUPTHER

## 2018-10-23 RX ORDER — FUROSEMIDE 40 MG/1
40 TABLET ORAL 2 TIMES DAILY
Qty: 60 TABLET | Refills: 5 | Status: SHIPPED | OUTPATIENT
Start: 2018-10-23 | End: 2019-04-09 | Stop reason: SDUPTHER

## 2018-10-23 RX ORDER — HYDRALAZINE HYDROCHLORIDE 50 MG/1
50 TABLET, FILM COATED ORAL 3 TIMES DAILY
Qty: 90 TABLET | Refills: 5 | Status: SHIPPED | OUTPATIENT
Start: 2018-10-23 | End: 2019-01-16 | Stop reason: SDUPTHER

## 2018-10-23 RX ORDER — ALLOPURINOL 300 MG/1
300 TABLET ORAL DAILY
Qty: 30 TABLET | Refills: 5 | Status: SHIPPED | OUTPATIENT
Start: 2018-10-23 | End: 2019-06-26 | Stop reason: ALTCHOICE

## 2018-10-23 NOTE — PATIENT INSTRUCTIONS

## 2018-10-23 NOTE — PROGRESS NOTES
Assessment/Plan:    Type 2 diabetes mellitus with stage 3 chronic kidney disease, without long-term current use of insulin (Formerly [facility])  Lab Results   Component Value Date    HGBA1C 5 8 02/14/2018       No results for input(s): POCGLU in the last 72 hours  Blood Sugar Average: Last 72 hrs:  Patient last lasb show A1c of 5 8 she has lost 7-10 pounds since then If her A1c is <6 on this upcoming labs I will stop her metformin as she can try controlling it on diet alone John villegas to see eye doctor    Essential hypertension  paitent blood pressure is stable Patient continues with current care and see me in 6monhts    Chronic kidney disease, stage 3  Stage 3 renal disease is stable continuec currnet meds repeat albs    Chronic gout 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 tophus  Continue allopurinol No new isseus and no recent gout outbreak    Class 1 obesity due to excess calories with serious comorbidity and body mass index (BMI) of 33 0 to 33 9 in adult  discusssed ehr weight She will continue topamax and keep follwoup appt with weight los doctor    Mixed hyperlipidemia  Lipids stable Patient to continue currnet care       Diagnoses and all orders for this visit:    Type 2 diabetes mellitus with stage 3 chronic kidney disease, without long-term current use of insulin ([facility] [address] )  -     Comprehensive metabolic panel; Future  -     Hemoglobin A1C; Future    Essential hypertension  -     furosemide (LASIX) 40 mg tablet; Take 1 tablet (40 mg total) by mouth 2 (two) times a day  -     hydrALAZINE (APRESOLINE) 50 mg tablet; Take 1 tablet (50 mg total) by mouth 3 (three) times a day for 90 days  -     Comprehensive metabolic panel; Future    Acquired hypothyroidism  -     levothyroxine 150 mcg tablet; Take 1 tablet (150 mcg total) by mouth daily  -     TSH, 3rd generation with Free T4 reflex; Future    Chronic kidney disease, stage 3 (HCC)  -     furosemide (LASIX) 40 mg tablet;  Take 1 tablet (40 mg total) by mouth 2 (two) times a day  -     Comprehensive metabolic panel; Future    Mixed hyperlipidemia  -     Comprehensive metabolic panel; Future  -     Lipid panel; Future    Class 1 obesity due to excess calories with serious comorbidity and body mass index (BMI) of 33 0 to 33 9 in adult  -     Comprehensive metabolic panel; Future  -     Lipid panel; Future    Chronic gout 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 tophus  Comments:  stable on current medicaion  Orders:  -     allopurinol (ZYLOPRIM) 300 mg tablet; Take 1 tablet (300 mg total) by mouth daily    Acquired hypothyroidism  Comments:  restart medication and [facility] labs  Orders:  -     levothyroxine 150 mcg tablet; Take 1 tablet (150 mcg total) by mouth daily  -     TSH, 3rd generation with Free T4 reflex; Future    Idiopathic peripheral neuropathy  -     gabapentin (NEURONTIN) 400 mg capsule; Take 1 capsule (400 mg total) by mouth 3 (three) times a day    Chronic kidney disease, stage 3 ([facility] [address] )  Comments:  Patient will have labs done and restart her medications She has no insurance and so will not see nephrology at this time  Orders:  -     furosemide (LASIX) 40 mg tablet; Take 1 tablet (40 mg total) by mouth 2 (two) times a day  -     Comprehensive metabolic panel; Future    Moderate persistent asthma without complication  -     albuterol (VENTOLIN HFA) 90 mcg/act inhaler; Inhale 2 puffs every 6 (six) hours as needed for wheezing    Screening breast examination  -     Mammo screening bilateral w cad; Future    Other orders  -     topiramate (TOPAMAX) 25 mg tablet;           Subjective:   Chief Complaint   Patient presents with    Hyperlipidemia    Hypertension    Hypothyroidism    Diabetes          Patient ID: Keith Morgan is a 63 y o  female      [facility], hypertension stage 3 renal disease, hypothyroidism, hyperlipdiei and her obesity Paiten tlast lasb were great A1c was <6 and her LDL was at goal with stable kidney function Patient needs to have repeat labs Patient is seeing weight loss doctor and is on topamax and doing well Paitne tlost 10 pounds so far  Patient needs mammogram done also        The following portions of the patient's history were reviewed and updated as appropriate: allergies, current medications, past social history and problem list     Review of Systems   Constitutional: Negative for activity change, appetite change, chills, fatigue and fever  HENT: Negative for congestion, ear discharge, ear pain, hearing loss, postnasal drip, sinus pressure, sore throat and trouble swallowing  Eyes: Negative for pain, discharge, redness, itching and visual disturbance  Respiratory: Negative for cough, chest tightness and shortness of breath  Cardiovascular: Negative for chest pain, palpitations and leg swelling  Gastrointestinal: Negative for abdominal pain, blood in stool, constipation, diarrhea, nausea and vomiting  Endocrine: Negative for cold intolerance, heat intolerance, polydipsia, polyphagia and polyuria  Genitourinary: Negative for difficulty urinating, dysuria, menstrual problem, urgency, vaginal bleeding and vaginal discharge  Musculoskeletal: Negative for arthralgias, back pain, gait problem, myalgias, neck pain and neck stiffness  Skin: Negative for rash and wound  Allergic/Immunologic: Negative for environmental allergies and food allergies  Neurological: Negative for dizziness, tremors, seizures, weakness and headaches  Hematological: Negative for adenopathy  Does not bruise/bleed easily  Psychiatric/Behavioral: Negative for confusion and sleep disturbance  The patient is not nervous/anxious  Objective:      /88   Ht 5' 9" (1 753 m)   Wt 92 4 kg (203 lb 12 8 oz)   BMI 30 10 kg/m²          Physical Exam   Constitutional: She is oriented to person, place, and time  She appears well-developed and well-nourished  HENT:   Head: Normocephalic and atraumatic  Right Ear: External ear normal    Left Ear: External ear normal    Nose: Nose normal    Mouth/Throat: Oropharynx is clear and moist    Eyes: Pupils are equal, round, and reactive to light  Conjunctivae and EOM are normal  Right eye exhibits no discharge  Left eye exhibits no discharge  Neck: Normal range of motion  Neck supple  No JVD present  No tracheal deviation present  No thyromegaly present  Cardiovascular: Normal rate, normal heart sounds and intact distal pulses  Pulses are no weak pulses  Pulses:       Dorsalis pedis pulses are 2+ on the right side, and 2+ on the left side  Posterior tibial pulses are 2+ on the right side, and 2+ on the left side  Pulmonary/Chest: Effort normal and breath sounds normal  She has no wheezes  She has no rales  Abdominal: Soft  Bowel sounds are normal  She exhibits no distension and no mass  There is no tenderness  There is no rebound  Musculoskeletal: Normal range of motion  Feet:   Right Foot:   Skin Integrity: Negative for ulcer, skin breakdown, erythema, warmth, callus or dry skin  Left Foot:   Skin Integrity: Negative for ulcer, skin breakdown, erythema, warmth, callus or dry skin  Lymphadenopathy:     She has no cervical adenopathy  Neurological: She is alert and oriented to person, place, and time  She has normal reflexes  No cranial nerve deficit  Coordination normal    Skin: Skin is warm and dry  No rash noted  Psychiatric: She has a normal mood and affect  Her behavior is normal  Judgment and thought content normal    Nursing note and vitals reviewed  Patient's shoes and socks removed  Right Foot/Ankle   Right Foot Inspection  Skin Exam: skin normal and skin intact no dry skin, no warmth, no callus, no erythema, no maceration, no abnormal color, no pre-ulcer, no ulcer and no callus                          Toe Exam: ROM and strength within normal limits  Sensory   Vibration: intact  Proprioception: intact   Monofilament testing: intact  Vascular  Capillary refills: < 3 seconds  The right DP pulse is 2+  The right PT pulse is 2+  Left Foot/Ankle  Left Foot Inspection  Skin Exam: skin normal and skin intactno dry skin, no warmth, no erythema, no maceration, normal color, no pre-ulcer, no ulcer and no callus                         Toe Exam: ROM and strength within normal limits                   Sensory   Vibration: intact  Proprioception: intact  Monofilament: intact  Vascular  Capillary refills: < 3 seconds  The left DP pulse is 2+  The left PT pulse is 2+  Assign Risk Category:  No deformity present; No loss of protective sensation;  No weak pulses       Risk: 0

## 2018-10-23 NOTE — ASSESSMENT & PLAN NOTE
Lab Results   Component Value Date    HGBA1C 5 8 02/14/2018       No results for input(s): POCGLU in the last 72 hours      Blood Sugar Average: Last 72 hrs:  Patient last lasb show A1c of 5 8 she has lost 7-10 pounds since then If her A1c is <6 on this upcoming labs I will stop her metformin as she can try controlling it on diet alone John villegas to see eye doctor

## 2019-01-16 DIAGNOSIS — I10 ESSENTIAL HYPERTENSION: ICD-10-CM

## 2019-01-16 DIAGNOSIS — E03.9 ACQUIRED HYPOTHYROIDISM: ICD-10-CM

## 2019-01-16 RX ORDER — HYDRALAZINE HYDROCHLORIDE 50 MG/1
50 TABLET, FILM COATED ORAL 3 TIMES DAILY
Qty: 90 TABLET | Refills: 0 | Status: SHIPPED | OUTPATIENT
Start: 2019-01-16 | End: 2019-06-26 | Stop reason: SDUPTHER

## 2019-01-16 RX ORDER — LEVOTHYROXINE SODIUM 0.15 MG/1
150 TABLET ORAL DAILY
Qty: 30 TABLET | Refills: 2 | Status: SHIPPED | OUTPATIENT
Start: 2019-01-16 | End: 2019-06-05 | Stop reason: SDUPTHER

## 2019-04-09 DIAGNOSIS — I10 ESSENTIAL HYPERTENSION: ICD-10-CM

## 2019-04-09 DIAGNOSIS — N18.30 CHRONIC KIDNEY DISEASE, STAGE 3 (HCC): ICD-10-CM

## 2019-04-09 RX ORDER — FUROSEMIDE 40 MG/1
40 TABLET ORAL 2 TIMES DAILY
Qty: 60 TABLET | Refills: 1 | Status: SHIPPED | OUTPATIENT
Start: 2019-04-09 | End: 2019-06-26 | Stop reason: SDUPTHER

## 2019-06-05 DIAGNOSIS — G60.9 IDIOPATHIC PERIPHERAL NEUROPATHY: ICD-10-CM

## 2019-06-05 DIAGNOSIS — E03.9 ACQUIRED HYPOTHYROIDISM: ICD-10-CM

## 2019-06-05 RX ORDER — GABAPENTIN 400 MG/1
400 CAPSULE ORAL 3 TIMES DAILY
Qty: 90 CAPSULE | Refills: 0 | Status: SHIPPED | OUTPATIENT
Start: 2019-06-05 | End: 2019-06-26 | Stop reason: SDUPTHER

## 2019-06-05 RX ORDER — LEVOTHYROXINE SODIUM 0.15 MG/1
150 TABLET ORAL DAILY
Qty: 30 TABLET | Refills: 0 | Status: SHIPPED | OUTPATIENT
Start: 2019-06-05 | End: 2019-06-26 | Stop reason: SDUPTHER

## 2019-06-26 ENCOUNTER — OFFICE VISIT (OUTPATIENT)
Dept: FAMILY MEDICINE CLINIC | Facility: CLINIC | Age: 64
End: 2019-06-26

## 2019-06-26 VITALS
BODY MASS INDEX: 30.07 KG/M2 | WEIGHT: 203 LBS | DIASTOLIC BLOOD PRESSURE: 98 MMHG | HEIGHT: 69 IN | SYSTOLIC BLOOD PRESSURE: 148 MMHG

## 2019-06-26 DIAGNOSIS — M1A.39X0 CHRONIC GOUT DUE TO RENAL IMPAIRMENT OF MULTIPLE SITES WITHOUT TOPHUS: ICD-10-CM

## 2019-06-26 DIAGNOSIS — N18.30 CHRONIC KIDNEY DISEASE, STAGE 3 (HCC): ICD-10-CM

## 2019-06-26 DIAGNOSIS — E78.2 MIXED HYPERLIPIDEMIA: ICD-10-CM

## 2019-06-26 DIAGNOSIS — E03.9 ACQUIRED HYPOTHYROIDISM: ICD-10-CM

## 2019-06-26 DIAGNOSIS — N18.30 TYPE 2 DIABETES MELLITUS WITH STAGE 3 CHRONIC KIDNEY DISEASE, WITHOUT LONG-TERM CURRENT USE OF INSULIN (HCC): Primary | ICD-10-CM

## 2019-06-26 DIAGNOSIS — Z12.39 SCREENING FOR BREAST CANCER: ICD-10-CM

## 2019-06-26 DIAGNOSIS — I10 ESSENTIAL HYPERTENSION: ICD-10-CM

## 2019-06-26 DIAGNOSIS — G60.9 IDIOPATHIC PERIPHERAL NEUROPATHY: ICD-10-CM

## 2019-06-26 DIAGNOSIS — E11.22 TYPE 2 DIABETES MELLITUS WITH STAGE 3 CHRONIC KIDNEY DISEASE, WITHOUT LONG-TERM CURRENT USE OF INSULIN (HCC): Primary | ICD-10-CM

## 2019-06-26 PROBLEM — E66.3 OVERWEIGHT (BMI 25.0-29.9): Status: ACTIVE | Noted: 2017-02-01

## 2019-06-26 LAB — SL AMB POCT HEMOGLOBIN AIC: 6.2 (ref ?–6.5)

## 2019-06-26 PROCEDURE — 83036 HEMOGLOBIN GLYCOSYLATED A1C: CPT | Performed by: FAMILY MEDICINE

## 2019-06-26 PROCEDURE — 99214 OFFICE O/P EST MOD 30 MIN: CPT | Performed by: FAMILY MEDICINE

## 2019-06-26 RX ORDER — HYDRALAZINE HYDROCHLORIDE 50 MG/1
50 TABLET, FILM COATED ORAL 3 TIMES DAILY
Qty: 270 TABLET | Refills: 1 | Status: SHIPPED | OUTPATIENT
Start: 2019-06-26 | End: 2020-02-04

## 2019-06-26 RX ORDER — GABAPENTIN 400 MG/1
400 CAPSULE ORAL 3 TIMES DAILY
Qty: 90 CAPSULE | Refills: 0 | Status: SHIPPED | OUTPATIENT
Start: 2019-06-26 | End: 2019-08-29 | Stop reason: SDUPTHER

## 2019-06-26 RX ORDER — LEVOTHYROXINE SODIUM 0.15 MG/1
150 TABLET ORAL DAILY
Qty: 90 TABLET | Refills: 1 | Status: SHIPPED | OUTPATIENT
Start: 2019-06-26 | End: 2019-07-03

## 2019-06-26 RX ORDER — FUROSEMIDE 40 MG/1
40 TABLET ORAL 2 TIMES DAILY
Qty: 180 TABLET | Refills: 1 | Status: SHIPPED | OUTPATIENT
Start: 2019-06-26 | End: 2019-08-24 | Stop reason: HOSPADM

## 2019-07-03 ENCOUNTER — APPOINTMENT (OUTPATIENT)
Dept: LAB | Facility: HOSPITAL | Age: 64
End: 2019-07-03

## 2019-07-03 DIAGNOSIS — E66.09 CLASS 1 OBESITY DUE TO EXCESS CALORIES WITH SERIOUS COMORBIDITY AND BODY MASS INDEX (BMI) OF 33.0 TO 33.9 IN ADULT: ICD-10-CM

## 2019-07-03 DIAGNOSIS — E11.22 TYPE 2 DIABETES MELLITUS WITH STAGE 3 CHRONIC KIDNEY DISEASE, WITHOUT LONG-TERM CURRENT USE OF INSULIN (HCC): ICD-10-CM

## 2019-07-03 DIAGNOSIS — I10 ESSENTIAL HYPERTENSION: ICD-10-CM

## 2019-07-03 DIAGNOSIS — E03.9 ACQUIRED HYPOTHYROIDISM: Primary | ICD-10-CM

## 2019-07-03 DIAGNOSIS — N18.30 TYPE 2 DIABETES MELLITUS WITH STAGE 3 CHRONIC KIDNEY DISEASE, WITHOUT LONG-TERM CURRENT USE OF INSULIN (HCC): ICD-10-CM

## 2019-07-03 DIAGNOSIS — N18.30 CHRONIC KIDNEY DISEASE, STAGE 3 (HCC): ICD-10-CM

## 2019-07-03 DIAGNOSIS — M1A.39X0 CHRONIC GOUT DUE TO RENAL IMPAIRMENT OF MULTIPLE SITES WITHOUT TOPHUS: ICD-10-CM

## 2019-07-03 DIAGNOSIS — E78.2 MIXED HYPERLIPIDEMIA: ICD-10-CM

## 2019-07-03 DIAGNOSIS — E03.9 ACQUIRED HYPOTHYROIDISM: ICD-10-CM

## 2019-07-03 LAB
ALBUMIN SERPL BCP-MCNC: 3.5 G/DL (ref 3.5–5)
ALP SERPL-CCNC: 124 U/L (ref 46–116)
ALT SERPL W P-5'-P-CCNC: 33 U/L (ref 12–78)
ANION GAP SERPL CALCULATED.3IONS-SCNC: 10 MMOL/L (ref 4–13)
AST SERPL W P-5'-P-CCNC: 22 U/L (ref 5–45)
BILIRUB SERPL-MCNC: 0.26 MG/DL (ref 0.2–1)
BUN SERPL-MCNC: 44 MG/DL (ref 5–25)
CALCIUM SERPL-MCNC: 9.2 MG/DL (ref 8.3–10.1)
CHLORIDE SERPL-SCNC: 107 MMOL/L (ref 100–108)
CHOLEST SERPL-MCNC: 180 MG/DL (ref 50–200)
CO2 SERPL-SCNC: 29 MMOL/L (ref 21–32)
CREAT SERPL-MCNC: 1.02 MG/DL (ref 0.6–1.3)
CREAT UR-MCNC: 13.5 MG/DL
EST. AVERAGE GLUCOSE BLD GHB EST-MCNC: 137 MG/DL
GFR SERPL CREATININE-BSD FRML MDRD: 60 ML/MIN/1.73SQ M
GLUCOSE P FAST SERPL-MCNC: 136 MG/DL (ref 65–99)
HBA1C MFR BLD: 6.4 % (ref 4.2–6.3)
HDLC SERPL-MCNC: 53 MG/DL (ref 40–60)
LDLC SERPL CALC-MCNC: 114 MG/DL (ref 0–100)
MICROALBUMIN UR-MCNC: 7.8 MG/L (ref 0–20)
MICROALBUMIN/CREAT 24H UR: 58 MG/G CREATININE (ref 0–30)
NONHDLC SERPL-MCNC: 127 MG/DL
POTASSIUM SERPL-SCNC: 3.8 MMOL/L (ref 3.5–5.3)
PROT SERPL-MCNC: 7.6 G/DL (ref 6.4–8.2)
SODIUM SERPL-SCNC: 146 MMOL/L (ref 136–145)
T4 FREE SERPL-MCNC: 1.57 NG/DL (ref 0.76–1.46)
TRIGL SERPL-MCNC: 66 MG/DL
TSH SERPL DL<=0.05 MIU/L-ACNC: 0.24 UIU/ML (ref 0.36–3.74)
URATE SERPL-MCNC: 11 MG/DL (ref 2–6.8)

## 2019-07-03 PROCEDURE — 80061 LIPID PANEL: CPT

## 2019-07-03 PROCEDURE — 84439 ASSAY OF FREE THYROXINE: CPT

## 2019-07-03 PROCEDURE — 83036 HEMOGLOBIN GLYCOSYLATED A1C: CPT

## 2019-07-03 PROCEDURE — 82043 UR ALBUMIN QUANTITATIVE: CPT

## 2019-07-03 PROCEDURE — 36415 COLL VENOUS BLD VENIPUNCTURE: CPT

## 2019-07-03 PROCEDURE — 84550 ASSAY OF BLOOD/URIC ACID: CPT

## 2019-07-03 PROCEDURE — 82570 ASSAY OF URINE CREATININE: CPT

## 2019-07-03 PROCEDURE — 80053 COMPREHEN METABOLIC PANEL: CPT

## 2019-07-03 PROCEDURE — 84443 ASSAY THYROID STIM HORMONE: CPT

## 2019-07-03 RX ORDER — LEVOTHYROXINE SODIUM 137 UG/1
137 TABLET ORAL DAILY
Qty: 90 TABLET | Refills: 0 | Status: SHIPPED | OUTPATIENT
Start: 2019-07-03 | End: 2019-11-26 | Stop reason: SDUPTHER

## 2019-08-22 ENCOUNTER — APPOINTMENT (INPATIENT)
Dept: MRI IMAGING | Facility: HOSPITAL | Age: 64
DRG: 045 | End: 2019-08-22
Payer: COMMERCIAL

## 2019-08-22 ENCOUNTER — HOSPITAL ENCOUNTER (INPATIENT)
Facility: HOSPITAL | Age: 64
LOS: 2 days | Discharge: HOME | DRG: 045 | End: 2019-08-24
Attending: EMERGENCY MEDICINE | Admitting: INTERNAL MEDICINE
Payer: COMMERCIAL

## 2019-08-22 ENCOUNTER — APPOINTMENT (EMERGENCY)
Dept: RADIOLOGY | Facility: HOSPITAL | Age: 64
DRG: 045 | End: 2019-08-22
Payer: COMMERCIAL

## 2019-08-22 ENCOUNTER — APPOINTMENT (EMERGENCY)
Dept: CT IMAGING | Facility: HOSPITAL | Age: 64
DRG: 045 | End: 2019-08-22
Payer: COMMERCIAL

## 2019-08-22 DIAGNOSIS — E78.2 MIXED HYPERLIPIDEMIA: ICD-10-CM

## 2019-08-22 DIAGNOSIS — R47.9 DIFFICULTY WITH SPEECH: ICD-10-CM

## 2019-08-22 DIAGNOSIS — G45.9 TIA (TRANSIENT ISCHEMIC ATTACK): Primary | ICD-10-CM

## 2019-08-22 DIAGNOSIS — R07.9 CHEST PAIN, RULE OUT ACUTE MYOCARDIAL INFARCTION: ICD-10-CM

## 2019-08-22 DIAGNOSIS — N28.9 ACUTE RENAL INSUFFICIENCY: ICD-10-CM

## 2019-08-22 PROBLEM — R29.898 WEAKNESS OF EXTREMITY: Status: ACTIVE | Noted: 2019-08-22

## 2019-08-22 PROBLEM — Z72.0 TOBACCO ABUSE: Chronic | Status: ACTIVE | Noted: 2019-08-22

## 2019-08-22 LAB
ALBUMIN SERPL BCP-MCNC: 3.9 G/DL (ref 3.5–5)
ALP SERPL-CCNC: 117 U/L (ref 46–116)
ALT SERPL W P-5'-P-CCNC: 27 U/L (ref 12–78)
ANION GAP SERPL CALCULATED.3IONS-SCNC: 13 MMOL/L (ref 4–13)
AST SERPL W P-5'-P-CCNC: 19 U/L (ref 5–45)
ATRIAL RATE: 63 BPM
BASOPHILS # BLD AUTO: 0.04 THOUSANDS/ΜL (ref 0–0.1)
BASOPHILS NFR BLD AUTO: 1 % (ref 0–1)
BILIRUB SERPL-MCNC: 0.4 MG/DL (ref 0.2–1)
BUN SERPL-MCNC: 45 MG/DL (ref 5–25)
CALCIUM SERPL-MCNC: 8.7 MG/DL (ref 8.3–10.1)
CHLORIDE SERPL-SCNC: 106 MMOL/L (ref 100–108)
CO2 SERPL-SCNC: 24 MMOL/L (ref 21–32)
CREAT SERPL-MCNC: 1.77 MG/DL (ref 0.6–1.3)
EOSINOPHIL # BLD AUTO: 0.28 THOUSAND/ΜL (ref 0–0.61)
EOSINOPHIL NFR BLD AUTO: 3 % (ref 0–6)
ERYTHROCYTE [DISTWIDTH] IN BLOOD BY AUTOMATED COUNT: 12.9 % (ref 11.6–15.1)
GFR SERPL CREATININE-BSD FRML MDRD: 31 ML/MIN/1.73SQ M
GLUCOSE SERPL-MCNC: 141 MG/DL (ref 65–140)
HCT VFR BLD AUTO: 39.1 % (ref 34.8–46.1)
HGB BLD-MCNC: 12.3 G/DL (ref 11.5–15.4)
IMM GRANULOCYTES # BLD AUTO: 0.05 THOUSAND/UL (ref 0–0.2)
IMM GRANULOCYTES NFR BLD AUTO: 1 % (ref 0–2)
LYMPHOCYTES # BLD AUTO: 1.34 THOUSANDS/ΜL (ref 0.6–4.47)
LYMPHOCYTES NFR BLD AUTO: 16 % (ref 14–44)
MCH RBC QN AUTO: 30 PG (ref 26.8–34.3)
MCHC RBC AUTO-ENTMCNC: 31.5 G/DL (ref 31.4–37.4)
MCV RBC AUTO: 95 FL (ref 82–98)
MONOCYTES # BLD AUTO: 0.88 THOUSAND/ΜL (ref 0.17–1.22)
MONOCYTES NFR BLD AUTO: 10 % (ref 4–12)
NEUTROPHILS # BLD AUTO: 5.84 THOUSANDS/ΜL (ref 1.85–7.62)
NEUTS SEG NFR BLD AUTO: 69 % (ref 43–75)
NRBC BLD AUTO-RTO: 0 /100 WBCS
P AXIS: 68 DEGREES
PLATELET # BLD AUTO: 210 THOUSANDS/UL (ref 149–390)
PLATELET # BLD AUTO: 232 THOUSANDS/UL (ref 149–390)
PMV BLD AUTO: 11.4 FL (ref 8.9–12.7)
PMV BLD AUTO: 11.5 FL (ref 8.9–12.7)
POTASSIUM SERPL-SCNC: 3.6 MMOL/L (ref 3.5–5.3)
PR INTERVAL: 156 MS
PROT SERPL-MCNC: 7.7 G/DL (ref 6.4–8.2)
QRS AXIS: -28 DEGREES
QRSD INTERVAL: 118 MS
QT INTERVAL: 442 MS
QTC INTERVAL: 452 MS
RBC # BLD AUTO: 4.1 MILLION/UL (ref 3.81–5.12)
SODIUM SERPL-SCNC: 143 MMOL/L (ref 136–145)
T WAVE AXIS: 74 DEGREES
TROPONIN I SERPL-MCNC: <0.02 NG/ML
VENTRICULAR RATE: 63 BPM
WBC # BLD AUTO: 8.43 THOUSAND/UL (ref 4.31–10.16)

## 2019-08-22 PROCEDURE — 70551 MRI BRAIN STEM W/O DYE: CPT

## 2019-08-22 PROCEDURE — 70450 CT HEAD/BRAIN W/O DYE: CPT

## 2019-08-22 PROCEDURE — 80053 COMPREHEN METABOLIC PANEL: CPT | Performed by: EMERGENCY MEDICINE

## 2019-08-22 PROCEDURE — 36415 COLL VENOUS BLD VENIPUNCTURE: CPT

## 2019-08-22 PROCEDURE — 85049 AUTOMATED PLATELET COUNT: CPT | Performed by: INTERNAL MEDICINE

## 2019-08-22 PROCEDURE — 93010 ELECTROCARDIOGRAM REPORT: CPT | Performed by: INTERNAL MEDICINE

## 2019-08-22 PROCEDURE — 70544 MR ANGIOGRAPHY HEAD W/O DYE: CPT

## 2019-08-22 PROCEDURE — 99255 IP/OBS CONSLTJ NEW/EST HI 80: CPT | Performed by: PSYCHIATRY & NEUROLOGY

## 2019-08-22 PROCEDURE — 85025 COMPLETE CBC W/AUTO DIFF WBC: CPT | Performed by: EMERGENCY MEDICINE

## 2019-08-22 PROCEDURE — 84484 ASSAY OF TROPONIN QUANT: CPT | Performed by: EMERGENCY MEDICINE

## 2019-08-22 PROCEDURE — 99285 EMERGENCY DEPT VISIT HI MDM: CPT | Performed by: EMERGENCY MEDICINE

## 2019-08-22 PROCEDURE — 84484 ASSAY OF TROPONIN QUANT: CPT | Performed by: INTERNAL MEDICINE

## 2019-08-22 PROCEDURE — 36415 COLL VENOUS BLD VENIPUNCTURE: CPT | Performed by: INTERNAL MEDICINE

## 2019-08-22 PROCEDURE — 99285 EMERGENCY DEPT VISIT HI MDM: CPT

## 2019-08-22 PROCEDURE — 93005 ELECTROCARDIOGRAM TRACING: CPT

## 2019-08-22 PROCEDURE — 99223 1ST HOSP IP/OBS HIGH 75: CPT | Performed by: INTERNAL MEDICINE

## 2019-08-22 PROCEDURE — 71046 X-RAY EXAM CHEST 2 VIEWS: CPT

## 2019-08-22 PROCEDURE — 96360 HYDRATION IV INFUSION INIT: CPT

## 2019-08-22 RX ORDER — HYDRALAZINE HYDROCHLORIDE 25 MG/1
50 TABLET, FILM COATED ORAL EVERY 8 HOURS SCHEDULED
Status: DISCONTINUED | OUTPATIENT
Start: 2019-08-22 | End: 2019-08-24 | Stop reason: HOSPADM

## 2019-08-22 RX ORDER — ASPIRIN 325 MG
325 TABLET ORAL DAILY
Status: DISCONTINUED | OUTPATIENT
Start: 2019-08-22 | End: 2019-08-23

## 2019-08-22 RX ORDER — NICOTINE 21 MG/24HR
1 PATCH, TRANSDERMAL 24 HOURS TRANSDERMAL DAILY
Status: DISCONTINUED | OUTPATIENT
Start: 2019-08-22 | End: 2019-08-24 | Stop reason: HOSPADM

## 2019-08-22 RX ORDER — GABAPENTIN 400 MG/1
400 CAPSULE ORAL 3 TIMES DAILY
Status: DISCONTINUED | OUTPATIENT
Start: 2019-08-22 | End: 2019-08-24 | Stop reason: HOSPADM

## 2019-08-22 RX ORDER — CLONIDINE HYDROCHLORIDE 0.1 MG/1
0.1 TABLET ORAL EVERY 8 HOURS PRN
Status: DISCONTINUED | OUTPATIENT
Start: 2019-08-22 | End: 2019-08-24 | Stop reason: HOSPADM

## 2019-08-22 RX ORDER — HEPARIN SODIUM 5000 [USP'U]/ML
5000 INJECTION, SOLUTION INTRAVENOUS; SUBCUTANEOUS EVERY 8 HOURS SCHEDULED
Status: DISCONTINUED | OUTPATIENT
Start: 2019-08-22 | End: 2019-08-24 | Stop reason: HOSPADM

## 2019-08-22 RX ADMIN — HEPARIN SODIUM 5000 UNITS: 5000 INJECTION INTRAVENOUS; SUBCUTANEOUS at 15:01

## 2019-08-22 RX ADMIN — NICOTINE 1 PATCH: 21 PATCH TRANSDERMAL at 14:58

## 2019-08-22 RX ADMIN — HYDRALAZINE HYDROCHLORIDE 50 MG: 25 TABLET ORAL at 20:26

## 2019-08-22 RX ADMIN — HEPARIN SODIUM 5000 UNITS: 5000 INJECTION INTRAVENOUS; SUBCUTANEOUS at 20:26

## 2019-08-22 RX ADMIN — SODIUM CHLORIDE 1000 ML: 0.9 INJECTION, SOLUTION INTRAVENOUS at 09:01

## 2019-08-22 RX ADMIN — GABAPENTIN 400 MG: 400 CAPSULE ORAL at 20:27

## 2019-08-22 RX ADMIN — GABAPENTIN 400 MG: 400 CAPSULE ORAL at 17:48

## 2019-08-22 RX ADMIN — HYDRALAZINE HYDROCHLORIDE 50 MG: 25 TABLET ORAL at 14:56

## 2019-08-22 RX ADMIN — ASPIRIN 325 MG ORAL TABLET 325 MG: 325 PILL ORAL at 12:59

## 2019-08-22 NOTE — ED NOTES
Pt had arm bent and slowed NSS infusion  Pt advised to keep arm straight       Danielle Gonzalez RN  08/22/19 8931

## 2019-08-22 NOTE — ED PROVIDER NOTES
History  Chief Complaint   Patient presents with    Chest Pain     pt c/o chest pain since she woke up this morning at 0500  describes the pain as a tightness and pressure  also c/o bilateral hand numbness since monday  denies cardiac hx       81-year-old female presents for evaluation of chest pain since she woke up this morning  She states that she has had intermittent symptoms for couple days but is worse today  The patient also relates a history of having speech difficulty with intermittent left hand weakness since Monday  Patient denies any associated headache or visual changes  The patient denies any sensory deficit  The patient denies any previous history of chest pain or weakness symptoms  Prior to Admission Medications   Prescriptions Last Dose Informant Patient Reported? Taking?    albuterol (PROVENTIL HFA,VENTOLIN HFA) 90 mcg/act inhaler 8/22/2019 at Unknown time  No Yes   Sig: Inhale 2 puffs every 6 (six) hours as needed for wheezing   furosemide (LASIX) 40 mg tablet 8/22/2019 at Unknown time  No Yes   Sig: Take 1 tablet (40 mg total) by mouth 2 (two) times a day   gabapentin (NEURONTIN) 400 mg capsule Past Month at Unknown time  No Yes   Sig: Take 1 capsule (400 mg total) by mouth 3 (three) times a day   hydrALAZINE (APRESOLINE) 50 mg tablet 8/22/2019 at Unknown time  No Yes   Sig: Take 1 tablet (50 mg total) by mouth 3 (three) times a day for 90 days   levothyroxine 137 mcg tablet 8/22/2019 at Unknown time  No Yes   Sig: Take 1 tablet (137 mcg total) by mouth daily      Facility-Administered Medications: None       Past Medical History:   Diagnosis Date    Acute renal failure (ARF) (HCC)     Anemia     Asthma     Atopic dermatitis     Chronic kidney disease     Stage 3    Colon polyps     Diabetes mellitus (HCC)     type 2    Disease of thyroid gland     hypothyroidism    Diverticulosis     Edema of both legs     Gout     H/O colonoscopy     H/O mammogram     Hemorrhoids  Hyperlipidemia     Hypertension     Nephrosclerosis     Pain in unspecified foot     Peripheral neuropathy     Phlebitis     Venous stasis dermatitis of both lower extremities     Wrist joint pain     LEFT       Past Surgical History:   Procedure Laterality Date    CERVIX SURGERY      COLONOSCOPY N/A 5/31/2016    Procedure: COLONOSCOPY;  Surgeon: Belkys Vang MD;  Location: [facility]; Service:     VEIN SURGERY         Family History   Problem Relation Age of Onset    Diabetes Mother         Type 2 as per allscripts    Heart attack Father     Diabetes type II Sister     Hypertension Other      I have reviewed and agree with the history as documented  Social History     Tobacco Use    Smoking status: Current Every Day Smoker     Packs/day: 0 50     Types: Cigarettes    Smokeless tobacco: Never Used    Tobacco comment: 30   Substance Use Topics    Alcohol use: Yes     Comment: social    Drug use: No        Review of Systems   Constitutional: Negative for chills, fatigue and fever  HENT: Negative for sore throat  Eyes: Negative for visual disturbance  Respiratory: Positive for chest tightness  Negative for shortness of breath  Cardiovascular: Positive for chest pain  Gastrointestinal: Negative for abdominal pain, diarrhea, nausea and vomiting  Genitourinary: Negative for difficulty urinating, dysuria and pelvic pain  Musculoskeletal: Negative for back pain  Skin: Negative for rash  Neurological: Positive for weakness and numbness  Negative for syncope and headaches  All other systems reviewed and are negative  Physical Exam  Physical Exam   Constitutional: She is oriented to person, place, and time  She appears well-developed and well-nourished  No distress  HENT:   Head: Normocephalic and atraumatic  Right Ear: External ear normal    Left Ear: External ear normal    Eyes: Pupils are equal, round, and reactive to light   Conjunctivae and EOM are normal  No scleral icterus  Neck: Normal range of motion  Cardiovascular: Normal rate, regular rhythm and normal heart sounds  Pulmonary/Chest: Effort normal and breath sounds normal  No respiratory distress  Abdominal: Soft  Bowel sounds are normal  There is no tenderness  There is no rebound and no guarding  Musculoskeletal: Normal range of motion  She exhibits no edema  Neurological: She is alert and oriented to person, place, and time  Skin: Skin is warm and dry  No rash noted  Psychiatric: She has a normal mood and affect  Nursing note and vitals reviewed        Vital Signs  ED Triage Vitals   Temperature Pulse Respirations Blood Pressure SpO2   08/22/19 0733 08/22/19 0733 08/22/19 0733 08/22/19 0733 08/22/19 0733   98 5 °F (36 9 °C) 58 16 (!) 173/79 97 %      Temp Source Heart Rate Source Patient Position - Orthostatic VS BP Location FiO2 (%)   08/22/19 0733 08/22/19 0843 08/22/19 0843 08/22/19 0843 --   Oral Monitor Lying Right arm       Pain Score       08/22/19 0731       8           Vitals:    08/22/19 0733 08/22/19 0843 08/22/19 0947   BP: (!) 173/79 162/87 170/76   Pulse: 58 71 63   Patient Position - Orthostatic VS:  Lying Lying         Visual Acuity  Visual Acuity      Most Recent Value   L Pupil Size (mm)  2   R Pupil Size (mm)  2          ED Medications  Medications   sodium chloride 0 9 % bolus 1,000 mL (1,000 mL Intravenous New Bag 8/22/19 0901)       Diagnostic Studies  Results Reviewed     Procedure Component Value Units Date/Time    Troponin I [[id number]]  (Normal) Collected:  08/22/19 0738    Lab Status:  Final result Specimen:  Blood from Arm, Left Updated:  08/22/19 0802     Troponin I <0 02 ng/mL     Comprehensive metabolic panel [[id number]]  (Abnormal) Collected:  08/22/19 0738    Lab Status:  Final result Specimen:  Blood from Arm, Left Updated:  08/22/19 0800     Sodium 143 mmol/L      Potassium 3 6 mmol/L      Chloride 106 mmol/L      CO2 24 mmol/L      ANION GAP 13 mmol/L      BUN 45 mg/dL      Creatinine 1 77 mg/dL      Glucose 141 mg/dL      Calcium 8 7 mg/dL      AST 19 U/L      ALT 27 U/L      Alkaline Phosphatase 117 U/L      Total Protein 7 7 g/dL      Albumin 3 9 g/dL      Total Bilirubin 0 40 mg/dL      eGFR 31 ml/min/1 73sq m     Narrative:       [place] guidelines for Chronic Kidney Disease (CKD):     Stage 1 with normal or high GFR (GFR > 90 mL/min/1 73 square meters)    Stage 2 Mild CKD (GFR = 60-89 mL/min/1 73 square meters)    Stage 3A Moderate CKD (GFR = 45-59 mL/min/1 73 square meters)    Stage 3B Moderate CKD (GFR = 30-44 mL/min/1 73 square meters)    Stage 4 Severe CKD (GFR = 15-29 mL/min/1 73 square meters)    Stage 5 End Stage CKD (GFR <15 mL/min/1 73 square meters)  Note: GFR calculation is accurate only with a steady state creatinine    CBC and differential [[id number]] Collected:  08/22/19 0738    Lab Status:  Final result Specimen:  Blood from Arm, Left Updated:  08/22/19 0743     WBC 8 43 Thousand/uL      RBC 4 10 Million/uL      Hemoglobin 12 3 g/dL      Hematocrit 39 1 %      MCV 95 fL      MCH 30 0 pg      MCHC 31 5 g/dL      RDW 12 9 %      MPV 11 5 fL      Platelets 740 Thousands/uL      nRBC 0 /100 WBCs      Neutrophils Relative 69 %      Immat GRANS % 1 %      Lymphocytes Relative 16 %      Monocytes Relative 10 %      Eosinophils Relative 3 %      Basophils Relative 1 %      Neutrophils Absolute 5 84 Thousands/µL      Immature Grans Absolute 0 05 Thousand/uL      Lymphocytes Absolute 1 34 Thousands/µL      Monocytes Absolute 0 88 Thousand/µL      Eosinophils Absolute 0 28 Thousand/µL      Basophils Absolute 0 04 Thousands/µL                  CT head without contrast   Final Result by Zenia Martinez MD (08/22 [id number])      No acute intracranial hemorrhage seen   No CT signs of acute territorial infarction   Stable CT               Workstation performed: [id number]         XR chest 2 views   ED Interpretation by Rajiv Dixon DO (08/22 7436)   ED Provider X-ray Interpretation      My X-ray interpretation of the Chest: was negative for infiltrate, effusion, pneumothorax, or wide mediastinum      Nurys Richmond DO      Final Result by Elizabeth Cervantes MD (41/06 [id number])      No acute cardiopulmonary disease  Workstation performed: [id number]                    Procedures  ECG 12 Lead Documentation Only  Date/Time: 8/22/2019 8:32 AM  Performed by: Nurys Richmond DO  Authorized by: Nurys Richmond DO     Indications / Diagnosis:  Chest pain  ECG reviewed by me, the ED Provider: yes    Patient location:  ED  Previous ECG:     Previous ECG:  Unavailable  Interpretation:     Interpretation: normal    Rate:     ECG rate:  63    ECG rate assessment: normal    Rhythm:     Rhythm: sinus rhythm    Ectopy:     Ectopy: PAC    QRS:     QRS axis:  Normal    QRS intervals:  Normal  Conduction:     Conduction: normal    ST segments:     ST segments:  Normal  T waves:     T waves: normal             ED Course  ED Course as of Aug 22 1024   Thu Aug 22, 2019   1008 SLIm paged for admit      1016 I discussed the case with the hospitalist  We reviewed the HPI, pertinent PMH, ED course and workup   Hospitalist agreed with plan and will admit the patient to the hospital             HEART Risk Score      Most Recent Value   History  0 Filed at: 08/22/2019 0856   ECG  1 Filed at: 08/22/2019 0856   Age  1 Filed at: 08/22/2019 0856   Risk Factors  1 Filed at: 08/22/2019 0856   Troponin  0 Filed at: 08/22/2019 0856   Heart Score Risk Calculator   History  0 Filed at: 08/22/2019 0856   ECG  1 Filed at: 08/22/2019 0856   Age  1 Filed at: 08/22/2019 0856   Risk Factors  1 Filed at: 08/22/2019 0856   Troponin  0 Filed at: 08/22/2019 0856   HEART Score  3 Filed at: 08/22/2019 0856   HEART Score  3 Filed at: 08/22/2019 0856          Stroke Assessment     Row Name 08/22/19 0831             NIH Stroke Scale    Interval  Baseline      Level of Consciousness (1a )  0 LOC Questions (1b )  0      LOC Commands (1c )  0      Best Gaze (2 )  0      Visual (3 )  0      Facial Palsy (4 )  0      Motor Arm, Left (5a )  0      Motor Arm, Right (5b )  0      Motor Leg, Left (6a )  0      Motor Leg, Right (6b )  0      Limb Ataxia (7 )  0      Sensory (8 )  0      Best Language (9 )  0      Dysarthria (10 )  0      Extinction and Inattention (11 ) (Formerly Neglect)  0      Total  0          First Filed Value   TPA Decision  Patient not a TPA candidate  Patient is not a candidate options  Symptoms resolved/clearly non disabling                          MDM  Number of Diagnoses or Management Options  Acute renal insufficiency:   Chest pain, rule out acute myocardial infarction:   Difficulty with speech:   TIA (transient ischemic attack):   Diagnosis management comments: Plan is to check an EKG, laboratories, CT of the head to rule out arrhythmia, myocardial infarction verses possible TIA with plan for admission       Amount and/or Complexity of Data Reviewed  Clinical lab tests: ordered and reviewed  Tests in the radiology section of CPT®: ordered and reviewed  Tests in the medicine section of CPT®: ordered and reviewed  Review and summarize past medical records: yes  Discuss the patient with other providers: yes  Independent visualization of images, tracings, or specimens: yes        Disposition  Final diagnoses:   Chest pain, rule out acute myocardial infarction   Acute renal insufficiency   Difficulty with speech   TIA (transient ischemic attack)     Time reflects when diagnosis was documented in both MDM as applicable and the Disposition within this note     Time User Action Codes Description Comment    8/22/2019  8:33 AM Drema Half B Add [R07 9] Chest pain, rule out acute myocardial infarction     8/22/2019  8:33 AM Drema Half B Add [N28 9] Acute renal insufficiency     8/22/2019  8:33 AM Drema Half B Add [R47 9] Difficulty with speech     8/22/2019 10:09 AM Drema Half B Add [G45 9] TIA (transient ischemic attack)     8/22/2019 10:09 AM Janice RODAS Modify [R07 9] Chest pain, rule out acute myocardial infarction     8/22/2019 10:09 AM Janice RODAS Modify [G45 9] TIA (transient ischemic attack)       ED Disposition     ED Disposition Condition Date/Time Comment    Admit Stable Thu Aug 22, 2019 10:19 AM Case was discussed with Dr Pao Najera and the patient's admission status was agreed to be Admission Status: inpatient status to the service of Dr Pao Najera   Follow-up Information    None         Patient's Medications   Discharge Prescriptions    No medications on file     No discharge procedures on file      ED Provider  Electronically Signed by           Orlene Spurling, DO  08/22/19 1024

## 2019-08-22 NOTE — H&P
H&P Exam - Alina Dos Santos 66 y o  female MRN: [id number]    Unit/Bed#: ED 14 Encounter: [id number]        History of Present Illness   HPI:  Alina Dos Santos is a 66 y o  female who presents with 3 to 4 days of left-sided weakness, tingling of both hands, mild headache, intermittent chest pain with shortness of breath  Also slurred speech intermittently and balance is off leaning to the left  No precipitating or relieving factors  Weakness and imbalance seems to be consistent over 3 to 4 days speech is improving  Review of Systems   Constitutional: Negative  HENT: Negative  Eyes: Negative  Respiratory: Positive for shortness of breath  Cardiovascular: Positive for chest pain  Gastrointestinal: Negative  Endocrine: Negative  Genitourinary: Negative  Musculoskeletal: Negative  Allergic/Immunologic: Negative  Neurological: Positive for speech difficulty, weakness, numbness and headaches  Hematological: Negative  Psychiatric/Behavioral: Negative  Historical Information   Past Medical History:   Diagnosis Date    Acute renal failure (ARF) ([facility] [address] )     Anemia     Asthma     Atopic dermatitis     Chronic kidney disease     Stage 3    Colon polyps     Diabetes mellitus (HCC)     type 2    Disease of thyroid gland     hypothyroidism    Diverticulosis     Edema of both legs     Gout     H/O colonoscopy     H/O mammogram     Hemorrhoids     Hyperlipidemia     Hypertension     Nephrosclerosis     Pain in unspecified foot     Peripheral neuropathy     Phlebitis     Venous stasis dermatitis of both lower extremities     Wrist joint pain     LEFT     Past Surgical History:   Procedure Laterality Date    CERVIX SURGERY      COLONOSCOPY N/A 5/31/2016    Procedure: COLONOSCOPY;  Surgeon: Rexann Klinefelter, MD;  Location: [facility];   Service:     VEIN SURGERY       Social History   Social History     Substance and Sexual Activity   Alcohol Use Yes    Comment: social     Social History     Substance and Sexual Activity   Drug Use No     Social History     Tobacco Use   Smoking Status Current Every Day Smoker    Packs/day: 0 50    Types: Cigarettes   Smokeless Tobacco Never Used   Tobacco Comment    30     Family History   Problem Relation Age of Onset    Diabetes Mother         Type 2 as per allscripts    Heart attack Father     Diabetes type II Sister     Hypertension Other        Meds/Allergies     (Not in a hospital admission)  Allergies   Allergen Reactions    Neosporin [Neomycin-Bacitracin Zn-Polymyx]     Niacin And Related     Shellfish-Derived Products      seafood       Objective   Vitals: Blood pressure 158/70, pulse 63, temperature 98 5 °F (36 9 °C), temperature source Oral, resp  rate 14, weight 93 8 kg (206 lb 12 7 oz), SpO2 100 %        Intake/Output Summary (Last 24 hours) at 8/22/2019 1234  Last data filed at 8/22/2019 1038  Gross per 24 hour   Intake 1000 ml   Output    Net 1000 ml       Invasive Devices     Peripheral Intravenous Line            Peripheral IV 08/22/19 Left Antecubital less than 1 day                Physical Exam    Lab Results:   Admission on 08/22/2019   Component Date Value    WBC 08/22/2019 8 43     RBC 08/22/2019 4 10     Hemoglobin 08/22/2019 12 3     Hematocrit 08/22/2019 39 1     MCV 08/22/2019 95     MCH 08/22/2019 30 0     MCHC 08/22/2019 31 5     RDW 08/22/2019 12 9     MPV 08/22/2019 11 5     Platelets 25/96/0916 232     nRBC 08/22/2019 0     Neutrophils Relative 08/22/2019 69     Immat GRANS % 08/22/2019 1     Lymphocytes Relative 08/22/2019 16     Monocytes Relative 08/22/2019 10     Eosinophils Relative 08/22/2019 3     Basophils Relative 08/22/2019 1     Neutrophils Absolute 08/22/2019 5 84     Immature Grans Absolute 08/22/2019 0 05     Lymphocytes Absolute 08/22/2019 1 34     Monocytes Absolute 08/22/2019 0 88     Eosinophils Absolute 08/22/2019 0 28     Basophils Absolute 08/22/2019 0 04  Sodium 08/22/2019 143     Potassium 08/22/2019 3 6     Chloride 08/22/2019 106     CO2 08/22/2019 24     ANION GAP 08/22/2019 13     BUN 08/22/2019 45*    Creatinine 08/22/2019 1 77*    Glucose 08/22/2019 141*    Calcium 08/22/2019 8 7     AST 08/22/2019 19     ALT 08/22/2019 27     Alkaline Phosphatase 08/22/2019 117*    Total Protein 08/22/2019 7 7     Albumin 08/22/2019 3 9     Total Bilirubin 08/22/2019 0 40     eGFR 08/22/2019 31     Troponin I 08/22/2019 <0 02      Imaging: Xr Chest 2 Views    Result Date: 8/22/2019  Narrative: CHEST INDICATION:   Chest Pain  COMPARISON:   01/06/2016 EXAM PERFORMED/VIEWS:  XR CHEST PA & LATERAL Images: 2 FINDINGS: Cardiomediastinal silhouette appears unremarkable  The lungs are clear  No pneumothorax or pleural effusion  Osseous structures appear within normal limits for patient age  Impression: No acute cardiopulmonary disease  Workstation performed: [id number]     Ct Head Without Contrast    Result Date: 8/22/2019  Narrative: CT BRAIN - WITHOUT CONTRAST INDICATION:   speech difficulty, left hand weakness  COMPARISON:  February 9, 2018 TECHNIQUE:  CT examination of the brain was performed  In addition to axial images, coronal 2D reformatted images were created and submitted for interpretation  Radiation dose length product (DLP) for this visit:  846 mGy-cm   This examination, like all CT scans performed in the [facility], was performed utilizing techniques to minimize radiation dose exposure, including the use of iterative reconstruction and automated exposure control  IMAGE QUALITY:  Diagnostic  FINDINGS: PARENCHYMA:  No intracranial mass, mass effect or midline shift  No CT signs of acute infarction  No acute parenchymal hemorrhage  A hypodensity seen in the left frontal region in the right mastoid, stable VENTRICLES AND EXTRA-AXIAL SPACES:  Normal for the patient's age  VISUALIZED ORBITS AND PARANASAL SINUSES:  Unremarkable  CALVARIUM AND EXTRACRANIAL SOFT TISSUES:  Normal      Impression: No acute intracranial hemorrhage seen No CT signs of acute territorial infarction Stable CT Workstation performed: [id number]     EKG, Pathology, and Other Studies: I have personally reviewed pertinent reports  Assessment/Plan       Weakness/slurred speech/imbalance will admit with neuro checks to rule out TIA versus CVA associated, may be component of anxiety  Will request an MRI of the brain and consult Neurology initiate aspirin    Chest pain   ruled out acute coronary syndrome, continue tele and serial troponin, initiated aspirin    CKD 3    creatinine elevated on admission 1 77, hold diuretics and monitor with gentle fluids given in the ED  Baseline creatinine 1 2 to 1 3      Hypertension   elevated on admission continue hydralazine with low-salt diet and monitor    Diabetes   remote history, patient reports she is now nondiabetic per her PCP, will check a m  Glucose and hemoglobin A1c    Dyslipidemia   will request a lipid profile       Tobacco abuse  cessation counseling provided, initiate nicotine patch    Expect greater than 2 midnight stay

## 2019-08-22 NOTE — ED NOTES
Patient ambulatory to bathroom at this time  Steady gait observed        John Rincon RN  08/22/19 0533

## 2019-08-22 NOTE — ED NOTES
Brief report given to nurse covering inpatient room 444  RN advised to monitor BP       Nancy Charles, RN  08/22/19 8473

## 2019-08-22 NOTE — CONSULTS
Consultation - Neurology   Vero Byrne 66 y o  female MRN: [id number]  Unit/Bed#: ED 14 Encounter: [id number]      Assessment/Plan   Assessment:  3 66year-old with a history of multiple vascular risk factors who presented with dysarthria, left sided weakness, imbalance, and bilateral hand numbness since Monday  Concern for an acute infarction, will obtain MRI brain and admit to stroke pathway  Plan:  - Continue Aspirin 325mg  - MRI brain without contrast pending   - Echocardiogram  - Lipid panel pending  - HgbA1c pending  - Telemetry  - Secondary risk factor modification  - Smoking cessation advised   - PT/OT/ST  - Stroke Education  - Frequent neurological checks  - Stat CT head with acute worsening in neuro exam/mental status  - Notify Neurology with any changes in examination  Results:  1  CT head: No acute intracranial hemorrhage seen  No CT signs of acute territorial infarction  Stable CT  History of Present Illness     Reason for Consult / Principal Problem: TIA  Hx and PE limited by: None  HPI: Vero Byrne is a 66 y o  female with a PMH of tobacco use, HTN, HLD, DM, CKD, asthma, and anemia who presented to [facility] ED for chest pain upon waking up this morning  Patient has been experiencing intermittent chest pain for the past few days, but the pain worsened today, prompting the ED visit  Neurology is consulted because patient has also been experiencing intermittent dysarthria, imbalance, left sided weakness, and bilateral hand numbness  Patient states that on Monday she woke up and had a headache and felt off balance  Patient reports with ambulation, she feels like her left leg was going to give out  Patient reports that her left arm felt uncoordinated  She describes the headache as starting in her neck and radiating up to the front of her head bilaterally  She denies a history of migraine or stroke  She reports that she was also experiencing dysarthria and word finding difficulty  Patient states that she was just not feeling like her self  Patient's  states that he noticed something being off about his wife  Today, patient states that her speech improved and is close to her baseline  She reports that the bilateral hand numbness has dissipated  She reports that she is still experiencing LUE and LLE weakness and a headache  She reports that her balance has improved, but not back to baseline  Currently, she denies chest pain, shortness of breath, abdominal pain, and dizziness  Inpatient consult to Neurology  Consult performed by: Ranjith De La Cruz PA-C  Consult ordered by: Barnett Hodgkin, DO        ROS:  A 12 point ROS was completed  Other than the above mentioned complaints in the HPI, all remaining systems were negative  Historical Information   Past Medical History:   Diagnosis Date    [facility] failure ([facility]F) ([facility] [address]     Anemia     Asthma     Atopic dermatitis     Chronic kidney disease     Stage 3    Colon polyps     Diabetes mellitus (HCC)     type 2    Disease of thyroid gland     hypothyroidism    Diverticulosis     Edema of both legs     Gout     H/O colonoscopy     H/O mammogram     Hemorrhoids     Hyperlipidemia     Hypertension     Nephrosclerosis     Pain in unspecified foot     Peripheral neuropathy     Phlebitis     Venous stasis dermatitis of both lower extremities     Wrist joint pain     LEFT     Past Surgical History:   Procedure Laterality Date    CERVIX SURGERY      COLONOSCOPY N/A 5/31/2016    Procedure: COLONOSCOPY;  Surgeon: Hasmukh Atkins MD;  Location: [facility];   Service:     VEIN SURGERY       Social History   Social History     Substance and Sexual Activity   Alcohol Use Yes    Comment: social     Social History     Substance and Sexual Activity   Drug Use No     Social History     Tobacco Use   Smoking Status Current Every Day Smoker    Packs/day: 0 50    Types: Cigarettes   Smokeless Tobacco Never Used   Tobacco Comment    30     Family History:   Family History   Problem Relation Age of Onset    Diabetes Mother         Type 2 as per allscripts    Heart attack Father     Diabetes type II Sister     Hypertension Other        Review of previous medical records was completed  Meds/Allergies   current meds:   Current Facility-Administered Medications   Medication Dose Route Frequency    aspirin tablet 325 mg  325 mg Oral Daily    and PTA meds:   Prior to Admission Medications   Prescriptions Last Dose Informant Patient Reported? Taking? albuterol (PROVENTIL HFA,VENTOLIN HFA) 90 mcg/act inhaler 8/22/2019 at Unknown time  No Yes   Sig: Inhale 2 puffs every 6 (six) hours as needed for wheezing   furosemide (LASIX) 40 mg tablet 8/22/2019 at Unknown time  No Yes   Sig: Take 1 tablet (40 mg total) by mouth 2 (two) times a day   gabapentin (NEURONTIN) 400 mg capsule Past Month at Unknown time  No Yes   Sig: Take 1 capsule (400 mg total) by mouth 3 (three) times a day   hydrALAZINE (APRESOLINE) 50 mg tablet 8/22/2019 at Unknown time  No Yes   Sig: Take 1 tablet (50 mg total) by mouth 3 (three) times a day for 90 days   levothyroxine 137 mcg tablet 8/22/2019 at Unknown time  No Yes   Sig: Take 1 tablet (137 mcg total) by mouth daily      Facility-Administered Medications: None       Allergies   Allergen Reactions    Neosporin [Neomycin-Bacitracin Zn-Polymyx]     Niacin And Related     Shellfish-Derived Products      seafood       Objective   Vitals:Blood pressure 158/70, pulse 63, temperature 98 5 °F (36 9 °C), temperature source Oral, resp  rate 14, weight 93 8 kg (206 lb 12 7 oz), SpO2 100 %  ,Body mass index is 30 54 kg/m²  Intake/Output Summary (Last 24 hours) at 8/22/2019 1236  Last data filed at 8/22/2019 1038  Gross per 24 hour   Intake 1000 ml   Output    Net 1000 ml       Invasive Devices:    Invasive Devices     Peripheral Intravenous Line            Peripheral IV 08/22/19 Left Antecubital less than 1 day Physical Exam   Constitutional: She is oriented to person, place, and time  No distress  HENT:   Head: Normocephalic and atraumatic  Right Ear: External ear normal    Left Ear: External ear normal    Nose: Nose normal    Mouth/Throat: Oropharynx is clear and moist    Eyes: Pupils are equal, round, and reactive to light  EOM are normal    Neck: Neck supple  Pulmonary/Chest: Effort normal  No respiratory distress  Musculoskeletal: Normal range of motion  Neurological: She is alert and oriented to person, place, and time  She has a normal Finger-Nose-Finger Test  Heel-to-shin test: Dysmetria with left leg heel to shin  Gait normal    Reflex Scores:       Bicep reflexes are 3+ on the right side and 3+ on the left side  Brachioradialis reflexes are 3+ on the right side and 3+ on the left side  Patellar reflexes are 2+ on the right side and 1+ on the left side  Skin: Skin is warm and dry  She is not diaphoretic  Psychiatric:   Slightly anxious     Neurologic Exam     Mental Status   Oriented to person, place, and time  Patient is alert and oriented to person, place, date, and town  Patient is able to name simple objects and follow all commands  No evidence of dysarthria or aphasia observed     Cranial Nerves     CN II   Visual fields full to confrontation  CN III, IV, VI   Pupils are equal, round, and reactive to light  Extraocular motions are normal    Nystagmus: none   Conjugate gaze: present    CN V   Facial sensation intact  CN VII   Left facial weakness: Decreased left nasolabial fold  CN VIII   Hearing: intact    CN IX, X   Palate: asymmetric (Slightly decreased on left side)    CN XI   Right trapezius strength: normal  Left trapezius strength: normal    CN XII   CN XII normal      Motor Exam   Right arm pronator drift: absent  Left arm pronator drift: Slight LUE pronator drift  Strength   Strength 5/5 except as noted     LUE strength: 5-/5  LLE strength: 5-/5 Sensory Exam   Light touch normal    Vibration normal    Pinprick normal      Gait, Coordination, and Reflexes     Gait  Gait: normal    Coordination   Finger to nose coordination: normal  Heel-to-shin test: Dysmetria with left leg heel to shin  Tremor   Resting tremor: absent  Intention tremor: absent  Action tremor: absent    Reflexes   Right brachioradialis: 3+  Left brachioradialis: 3+  Right biceps: 3+  Left biceps: 3+  Right patellar: 2+  Left patellar: 1+  Right ankle clonus: absent  Left ankle clonus: absent  Slower finger taps in left hand     Lab Results: I have personally reviewed pertinent reports  Imaging Studies: I have personally reviewed pertinent reports  and I have personally reviewed pertinent films in PACS  EKG, Pathology, and Other Studies: I have personally reviewed pertinent reports      VTE Prophylaxis: Sequential compression device (Venodyne)  and Heparin    Code Status: No Order  Advance Directive and Living Will:      Power of :    POLST:

## 2019-08-22 NOTE — PLAN OF CARE
Problem: Activity Intolerance/Impaired Mobility  Goal: Mobility/activity is maintained at optimum level for patient  Description  Interventions:  - Assess and monitor patient  barriers to mobility and need for assistive/adaptive devices  - Collaborate with interdisciplinary team and initiate plans and interventions as ordered  - Encourage independent activity and early ambulation       Outcome: Progressing     Problem: DISCHARGE PLANNING  Goal: Discharge to home or other facility with appropriate resources  Description  INTERVENTIONS:  - Arrange for needed discharge resources   - Identify discharge learning needs (meds, wound care, etc )  - Refer to Case Management Department for coordinating discharge planning if the patient needs post-hospital services based on physician/advanced practitioner order or complex needs related to functional status, cognitive ability, or social support system   Outcome: Progressing     Problem: Knowledge Deficit  Goal: Patient/family/caregiver demonstrates understanding of disease process, treatment plan, medications, and discharge instructions  Description  Complete learning assessment and assess knowledge base    Interventions:  - Provide teaching at level of understanding  - Provide teaching via preferred learning methods  Outcome: Progressing

## 2019-08-23 ENCOUNTER — APPOINTMENT (INPATIENT)
Dept: CT IMAGING | Facility: HOSPITAL | Age: 64
DRG: 045 | End: 2019-08-23
Payer: COMMERCIAL

## 2019-08-23 LAB
ANION GAP SERPL CALCULATED.3IONS-SCNC: 10 MMOL/L (ref 4–13)
ATRIAL RATE: 67 BPM
BUN SERPL-MCNC: 38 MG/DL (ref 5–25)
CALCIUM SERPL-MCNC: 8.8 MG/DL (ref 8.3–10.1)
CHLORIDE SERPL-SCNC: 110 MMOL/L (ref 100–108)
CHOLEST SERPL-MCNC: 181 MG/DL (ref 50–200)
CO2 SERPL-SCNC: 25 MMOL/L (ref 21–32)
CREAT SERPL-MCNC: 1.2 MG/DL (ref 0.6–1.3)
EST. AVERAGE GLUCOSE BLD GHB EST-MCNC: 131 MG/DL
GFR SERPL CREATININE-BSD FRML MDRD: 49 ML/MIN/1.73SQ M
GLUCOSE SERPL-MCNC: 120 MG/DL (ref 65–140)
HBA1C MFR BLD: 6.2 % (ref 4.2–6.3)
HDLC SERPL-MCNC: 45 MG/DL (ref 40–60)
LDLC SERPL CALC-MCNC: 104 MG/DL (ref 0–100)
NONHDLC SERPL-MCNC: 136 MG/DL
P AXIS: 65 DEGREES
POTASSIUM SERPL-SCNC: 3.5 MMOL/L (ref 3.5–5.3)
PR INTERVAL: 160 MS
QRS AXIS: -26 DEGREES
QRSD INTERVAL: 120 MS
QT INTERVAL: 420 MS
QTC INTERVAL: 443 MS
SODIUM SERPL-SCNC: 145 MMOL/L (ref 136–145)
T WAVE AXIS: 74 DEGREES
TRIGL SERPL-MCNC: 158 MG/DL
VENTRICULAR RATE: 67 BPM

## 2019-08-23 PROCEDURE — 83036 HEMOGLOBIN GLYCOSYLATED A1C: CPT | Performed by: INTERNAL MEDICINE

## 2019-08-23 PROCEDURE — 93010 ELECTROCARDIOGRAM REPORT: CPT | Performed by: INTERNAL MEDICINE

## 2019-08-23 PROCEDURE — 70498 CT ANGIOGRAPHY NECK: CPT

## 2019-08-23 PROCEDURE — G8988 SELF CARE GOAL STATUS: HCPCS

## 2019-08-23 PROCEDURE — 97166 OT EVAL MOD COMPLEX 45 MIN: CPT

## 2019-08-23 PROCEDURE — 99233 SBSQ HOSP IP/OBS HIGH 50: CPT | Performed by: PSYCHIATRY & NEUROLOGY

## 2019-08-23 PROCEDURE — G8978 MOBILITY CURRENT STATUS: HCPCS

## 2019-08-23 PROCEDURE — G8979 MOBILITY GOAL STATUS: HCPCS

## 2019-08-23 PROCEDURE — 97163 PT EVAL HIGH COMPLEX 45 MIN: CPT

## 2019-08-23 PROCEDURE — 99232 SBSQ HOSP IP/OBS MODERATE 35: CPT | Performed by: INTERNAL MEDICINE

## 2019-08-23 PROCEDURE — 80048 BASIC METABOLIC PNL TOTAL CA: CPT | Performed by: INTERNAL MEDICINE

## 2019-08-23 PROCEDURE — 80061 LIPID PANEL: CPT | Performed by: INTERNAL MEDICINE

## 2019-08-23 PROCEDURE — G8987 SELF CARE CURRENT STATUS: HCPCS

## 2019-08-23 RX ORDER — ATORVASTATIN CALCIUM 40 MG/1
40 TABLET, FILM COATED ORAL
Status: DISCONTINUED | OUTPATIENT
Start: 2019-08-23 | End: 2019-08-24 | Stop reason: HOSPADM

## 2019-08-23 RX ORDER — ASPIRIN 81 MG/1
81 TABLET ORAL DAILY
Status: DISCONTINUED | OUTPATIENT
Start: 2019-08-23 | End: 2019-08-23

## 2019-08-23 RX ORDER — ASPIRIN 81 MG/1
81 TABLET ORAL DAILY
Status: DISCONTINUED | OUTPATIENT
Start: 2019-08-24 | End: 2019-08-24 | Stop reason: HOSPADM

## 2019-08-23 RX ADMIN — HYDRALAZINE HYDROCHLORIDE 50 MG: 25 TABLET ORAL at 06:07

## 2019-08-23 RX ADMIN — ASPIRIN 325 MG ORAL TABLET 325 MG: 325 PILL ORAL at 08:55

## 2019-08-23 RX ADMIN — HYDRALAZINE HYDROCHLORIDE 50 MG: 25 TABLET ORAL at 15:34

## 2019-08-23 RX ADMIN — HEPARIN SODIUM 5000 UNITS: 5000 INJECTION INTRAVENOUS; SUBCUTANEOUS at 21:27

## 2019-08-23 RX ADMIN — HEPARIN SODIUM 5000 UNITS: 5000 INJECTION INTRAVENOUS; SUBCUTANEOUS at 06:07

## 2019-08-23 RX ADMIN — HEPARIN SODIUM 5000 UNITS: 5000 INJECTION INTRAVENOUS; SUBCUTANEOUS at 15:36

## 2019-08-23 RX ADMIN — NICOTINE 1 PATCH: 21 PATCH TRANSDERMAL at 08:55

## 2019-08-23 RX ADMIN — GABAPENTIN 400 MG: 400 CAPSULE ORAL at 17:26

## 2019-08-23 RX ADMIN — LEVOTHYROXINE SODIUM 137 MCG: 112 TABLET ORAL at 06:08

## 2019-08-23 RX ADMIN — IOHEXOL 85 ML: 350 INJECTION, SOLUTION INTRAVENOUS at 19:28

## 2019-08-23 RX ADMIN — GABAPENTIN 400 MG: 400 CAPSULE ORAL at 21:26

## 2019-08-23 RX ADMIN — ATORVASTATIN CALCIUM 40 MG: 40 TABLET, FILM COATED ORAL at 17:26

## 2019-08-23 RX ADMIN — HYDRALAZINE HYDROCHLORIDE 50 MG: 25 TABLET ORAL at 21:27

## 2019-08-23 RX ADMIN — GABAPENTIN 400 MG: 400 CAPSULE ORAL at 08:55

## 2019-08-23 NOTE — OCCUPATIONAL THERAPY NOTE
[address] Evaluation     Patient Name: Raysa Orr  [id number] Date: 8/23/2019  Problem List  Patient Active Problem List   Diagnosis    Chronic kidney disease, stage 3 ([facility] )    Mixed hyperlipidemia    Essential hypertension    Chronic gout 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 tophus    Acquired hypothyroidism    Nummular eczema    Overweight (BMI 25 0-29  9)    Type 2 diabetes mellitus with stage 3 chronic kidney disease, without long-term current use of insulin ([facility] )    Screening for breast cancer    Weakness of extremity    Tobacco abuse     Past Medical History  Past Medical History:   Diagnosis Date    Acute renal failure (ARF) (HCC)     Anemia     Asthma     Atopic dermatitis     Chronic kidney disease     Stage 3    Colon polyps     Diabetes mellitus (HCC)     type 2    Disease of thyroid gland     hypothyroidism    Diverticulosis     Edema of both legs     Gout     H/O colonoscopy     H/O mammogram     Hemorrhoids     Hyperlipidemia     Hypertension     Nephrosclerosis     Pain in unspecified foot     Peripheral neuropathy     Phlebitis     Venous stasis dermatitis of both lower extremities     Wrist joint pain     LEFT     Past Surgical History  Past Surgical History:   Procedure Laterality Date    CERVIX SURGERY      COLONOSCOPY N/A 5/31/2016    Procedure: COLONOSCOPY;  Surgeon: Shelli Mckee MD;  Location: [facility];   Service:    [address] [place] [address]               08/23/19 7282   Note Type   Note type Eval/Treat   Restrictions/Precautions   Weight Bearing Precautions Per Order Yes   Other Precautions Fall Risk;Telemetry   Pain Assessment   Pain Assessment No/denies pain   Pain Score No Pain   Home Living   Type of [address] [place] [address] One level  (2 SHIVA)   Bathroom Shower/Tub Tub/shower unit   Bathroom Toilet Standard   Bathroom Equipment   (no DME)   [address]   (no DME)   Additional Comments pt driving PTA, active and working   Prior Function   Level of [place] Independent with ADLs and functional mobility   Lives With Spouse  (mother in law)   Leroy Douglas 34 in the last 6 months 0   Vocational Full time employment   Comments pt reports family able to assist if needed   Lifestyle   Autonomy per pt independent w/ ADLs, independent w/ functional transfers and mobility w/ no AD, independent w/ IADLS, driving   Reciprocal Relationships spouse and mother in law   Service to Others works as a    [employer]atification watching tv and spending time w/ family   ADL   Where Assessed Chair   Eating Assistance 6  Modified independent   Grooming Assistance 6  Modified Independent   UB Bathing Assistance 6  Modified Independent   LB [address] 5  Supervision/Setup   [address] 6  Modified independent   [address]  [address]  5  Supervision/Setup   Bed Mobility   Supine to Sit 6  Modified independent   Additional items Bedrails   Sit to Supine 6  Modified independent   Transfers   Sit to Stand 5  Supervision   Additional items Assist x 1; Increased time required; Bedrails   Stand to Sit 5  Supervision   Additional items Assist x 1; Increased time required   Toilet transfer 5  Supervision   Additional items Assist x 1; Increased time required;Standard toilet   Functional Mobility   Functional Mobility 5  Supervision   Additional Comments assist x1 w/ no AD   Balance   Static Sitting Good   Dynamic Sitting Good   Static Standing Fair +   Dynamic Standing Fair   Ambulatory Fair   Activity Tolerance   Activity Tolerance Patient limited by fatigue;Patient tolerated treatment well   Nurse Made Aware appropriate to see per Surinder CRAIG Im Assessment   RUE Assessment WFL  (4/5)   RUE Strength   RUE Overall Strength Within Functional Limits - able to perform ADL tasks with strength  (4/5,  4/5)   R Shoulder Flexion 4/5   R Shoulder Extension 4/5   R Elbow Flexion 4+/5   R Elbow Extension 4+/5   LUE Assessment   LUE Assessment WFL   LUE Strength   LUE Overall Strength Within Functional Limits - able to perform ADL tasks with strength  ( 4-/5)   L Shoulder Flexion 4-/5   L Shoulder Extension 4-/5   L Elbow Flexion 4-/5   L Elbow Extension 4-/5   Hand Function   Gross Motor Coordination Functional  (increased time  L UE )   Fine Motor Coordination Functional  (increased time L hand)   Sensation   Light Touch No apparent deficits   Proprioception   Proprioception No apparent deficits   Vision-Basic Assessment   Current Vision No visual deficits   Vision - Complex Assessment   Ocular Range of Motion WFL   Head Position WDL   Tracking Able to track stimulus in all quads without difficulty   Acuity Able to read clock/calendar on wall without difficulty   Perception   Inattention/Neglect Appears intact   Cognition   Overall Cognitive Status [facility]   Arousal/Participation Alert; Cooperative   Attention Within functional limits   Orientation Level Oriented X4   Memory Within functional limits   Following Commands Follows one step commands without difficulty   Comments pt w/ slight dysarthria, pt engages in appropriate conversations; education  provided on BE FAST and stroke education, pt verbalized understanding   Assessment   Limitation Decreased ADL status; Decreased UE strength;Decreased Safe judgement during ADL;Decreased endurance;Decreased self-care trans;Decreased high-level ADLs; Decreased cognition;Decreased fine motor control   Prognosis Good   Assessment Pt is a 66 y o  female seen for OT evaluation s/p admit to [facility] on 8/22/2019 w/ L UE/LE weakness, dysarthria  MRI brain:  Early subacute lacunar infarct in the right ventral sachin  No mass effect or hemorrhage   Comorbidities affecting pt's functional performance at time of assessment include: HTN, tobacco abuse, DM II, CKD III, chest pain  Personal factors affecting pt at time of IE include:requires continued stroke education  Prior to admission, pt was living w/ spouse and reports independent w/ ADLs, independent w/ functional transfers and mobility w/ no AD, independent w/ IADLs, driving and working as a   Upon evaluation: Pt requires MOD I bed mobility, supervision sit<>Stand w/ VCs, supervision functional mobility w/ no AD, supervision LB ADLs, MOD I UB ADLs, supervision toileting 2* the following deficits impacting occupational performance: slight decreased strength L UE and  strength (4-/5), increased time for coordination tasks w/ L UE, slightly impaired balance, dysarthria, decreased endurance, impaired activity tolerance  Pt educated on functional activities such as using L UE to put items away in home, reaching for items, squeezing washcloth to improved coordination and strength L UE, pt verbalized understanding  Reviewed BEFAST w/ pt and pt verbalized understanding  Pt to benefit from continued skilled OT tx while in the hospital to address deficits as defined above and maximize level of functional independence w ADL's and functional mobility  Occupational Performance areas to address include: grooming, bathing/shower, toilet hygiene, dressing, health maintenance, functional mobility and clothing management, coordination and UE strengthening tasks  From OT standpoint, recommendation at time of d/c would be home w/ outpatient OT and family support  Goals   Patient Goals "to get back to normal"   LTG Time Frame 7-10   Long Term Goal please see below goals   Plan   Treatment Interventions ADL retraining;UE strengthening/ROM; Functional transfer training; Endurance training;Cognitive reorientation;Patient/family training;Equipment evaluation/education; Compensatory technique education; Energy conservation; Activityengagement   Goal Expiration Date 09/02/19   OT Frequency 2-3x/wk   Recommendation   OT Discharge Recommendation Outpatient OT   OT - OK to Discharge   (when medically stable)   Barthel Index   Feeding 10   Bathing 5   Grooming Score 5   Dressing Score 10   Bladder Score 10   Bowels Score 10   Toilet Use Score 10   Transfers (Bed/Chair) Score 10   Mobility (Level Surface) Score 10   Stairs Score 0   Barthel Index Score 80   Modified Rutherford Scale   Modified Rutherford Scale 3     Occupational Therapy Goals to be met in 7-10 days:  1) Pt will improve activity tolerance to G for min 30 min txment sessions to enhance ADLs  2) Pt will complete ADLs/self care w/ mod I   3) Pt will complete toileting w/ mod I w/ G hygiene/thoroughness using DME PRN  4) Pt will improve functional transfers on/off all surfaces using DME PRN w/ G balance/safety including toileting w/ mod I  5) Pt will improve fx'l mobility during I/ADl/leisure tasks using DME PRN w/ g balance/safety w/ mod I  6) Pt will engage in ongoing cognitive assessment w/ G participation to A w/ safe d/c planning/recommendations  7) Pt will demonstrate G carryover of pt/caregiver education and training as appropriate w/ mod I  w/ G tolerance  8) Pt will engage in depression screen/leisure interest checklist w/ G participation to monitor s/s depression and ID 3 positive coping strategies to A w/ emotional regulation and management  9) Pt will demonstrate improved standing tolerance to 3-5 minutes during functional tasks w/ Good dynamic standing balance to enhance ADL & IADL performance  10) Pt will demonstrate improved L UE strength by 1 MMT grade to enhance ADLS and functional transfers

## 2019-08-23 NOTE — PROGRESS NOTES
Progress Note - Gema Cornejo 66 y o  female MRN: [id number]    Unit/Bed#: E4 -01 Encounter: [id number]    Assessment/Plan:    Acute CVA   MRI revealed early subacute lacunar infarct right ventral sachin and extensive white matter lesions suggesting microangiopathic disease  Continue aspirin and risk reduction with statin stop smoking    Hypertension   control with hydralazine and Lasix    CKD 3    creatinine at baseline 1 2    Chest pain   most likely anxiety related, reviewed tele, troponin less than 0 2    Dyslipidemia   continue statin    Tobacco abuse  promises to never smoke again    Diabetes   hemoglobin A1c 6 2, a m  Glucose 120, will work harder on diet carbohydrate restriction    Hypothyroid   continue Synthroid    Subjective:   Speech is better, left-sided weakness and clumsiness improving denies chest pain shortness of breath nausea vomiting diarrhea no fevers chills appetite is okay    Objective:     Vitals: Blood pressure 148/66, pulse 58, temperature 97 7 °F (36 5 °C), temperature source Tympanic, resp  rate 18, height 5' 9" (1 753 m), weight 88 9 kg (195 lb 15 8 oz), SpO2 98 %  ,Body mass index is 28 94 kg/m²        Results from last 7 days   Lab Units 08/22/19  1634 08/22/19  0738   WBC Thousand/uL  --  8 43   HEMOGLOBIN g/dL  --  12 3   HEMATOCRIT %  --  39 1   PLATELETS Thousands/uL 210 232     Results from last 7 days   Lab Units 08/23/19  0524 08/22/19  0738   POTASSIUM mmol/L 3 5 3 6   CHLORIDE mmol/L 110* 106   CO2 mmol/L 25 24   BUN mg/dL 38* 45*   CREATININE mg/dL 1 20 1 77*   CALCIUM mg/dL 8 8 8 7   ALK PHOS U/L  --  117*   ALT U/L  --  27   AST U/L  --  19       Scheduled Meds:    Current Facility-Administered Medications:  [START ON 8/24/2019] aspirin 81 mg Oral Daily Shayy Rodriguez PA-C   atorvastatin 40 mg Oral Daily With [employer], PAM[facility]   cloNIDine 0 1 mg Oral Q8H PRN Franki Ray, DO   gabapentin 400 mg Oral TID Doan Ray, DO   heparin (porcine) 5,000 Units Subcutaneous [facility],    hydrALAZINE 50 mg Oral [facility],    levothyroxine 137 mcg Oral Early Morning [facility],    nicotine 1 patch Transdermal Daily [facility],        Continuous Infusions:     Physical exam:  General appearance:  Alert no distress oriented x3 interaction appropriate   Head/Eyes:  Nonicteric ARACELY EOMI  Neck:  Supple  Lungs: CTA bilateral no wheezing rhonchi or rales  Heart: normal S1 S2 regular  Abdomen: Soft nontender with bowel sounds  Extremities: no edema  Skin: no rash    Invasive Devices     Peripheral Intravenous Line            Peripheral IV 08/22/19 Left Antecubital 1 day                  VTE Pharmacologic Prophylaxis:  Heparin      Counseling / Coordination of Care  Total floor / unit time spent today 30  minutes  Greater than 50% of total time was spent with the patient and / or family counseling and / or coordination of care    A description of the counseling / coordination of care:  Discussed with Neuro

## 2019-08-23 NOTE — PLAN OF CARE
Problem: PHYSICAL THERAPY ADULT  Goal: Performs mobility at highest level of function for planned discharge setting  See evaluation for individualized goals  Description  Treatment/Interventions: Functional transfer training, LE strengthening/ROM, Elevations, Therapeutic exercise, Endurance training, Patient/family training, Equipment eval/education, Gait training  Equipment Recommended: (none)       See flowsheet documentation for full assessment, interventions and recommendations  Note:   Prognosis: Good  Problem List: Decreased strength, Decreased endurance, Impaired balance, Decreased mobility  Assessment: Pt is 66 y o  female seen for PT evaluation s/p [facility]/2019 w/ Weakness of extremity  PT consulted to assess pt's functional mobility and d/c needs  Order placed for PT eval and tx, w/ activity as tolerated order  Comorbidities affecting pt's physical performance at time of assessment include: HTN, tobacco abuse, DMII, CKD stage 3, prior TIA  PTA, pt was living in a ranch style home with her  and MIL, has 2 platform steps to enter without rail  She was independent with ADLs, IADLs, driving, and working full time as a  at [employer]    Personal factors affecting pt at time of IE include: stairs to enter home, inability to navigate community distances, inability to navigate level surfaces w/o external assistance, inability to perform current job functions and inability to perform IADLs  Please find objective findings from PT assessment regarding body systems outlined above with impairments and limitations including weakness, impaired balance, decreased endurance, impaired coordination, gait deviations and decreased functional mobility tolerance  The following objective measures performed on IE also reveal limitations: Barthel Index: 70/100   Pt's clinical presentation is currently unstable/unpredictable seen in pt's presentation of ongoing medical care for new lacunar CVA and functional mobility significantly below baseline  Pt to benefit from continued PT tx to address deficits as defined above and maximize level of functional independent mobility and consistency  From PT/mobility standpoint, recommendation at time of d/c would be Home PT with family support pending progress in order to facilitate return to PLOF  Barriers to Discharge: None     Recommendation: Home PT(HHPT, then OPPT)     PT - OK to Discharge: Yes(with family assist when med appropriate)    See flowsheet documentation for full assessment

## 2019-08-23 NOTE — PHYSICAL THERAPY NOTE
Physical Therapy Evaluation     Patient's Name: Munira Pascual    Admitting Diagnosis  TIA (transient ischemic attack) [G45 9]  Chest pain [R07 9]  Acute renal insufficiency [N28 9]  Chest pain, rule out acute myocardial infarction [R07 9]  Difficulty with speech [R47 9]    Problem List  Patient Active Problem List   Diagnosis    Chronic [facility] disease, stage 3 ([facility] [address] )    Mixed hyperlipidemia    Essential hypertension    Chronic gout 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 tophus    Acquired hypothyroidism    Nummular eczema    Overweight (BMI 25 0-29  9)    Type 2 diabetes mellitus with stage 3 chronic kidney disease, without long-term [facility]    Screening for breast cancer    Weakness of extremity    Tobacco abuse       Past Medical History  Past Medical History:   Diagnosis Date    Acute renal failure (ARF) (HCC)     Anemia     Asthma     Atopic dermatitis     Chronic kidney disease     Stage 3    Colon polyps     Diabetes mellitus (HCC)     type 2    Disease of thyroid gland     hypothyroidism    Diverticulosis     Edema of both legs     Gout     H/O colonoscopy     H/O mammogram     Hemorrhoids     Hyperlipidemia     Hypertension     Nephrosclerosis     Pain in unspecified foot     Peripheral neuropathy     Phlebitis     Venous stasis dermatitis of both lower extremities     Wrist joint pain     LEFT       Past Surgical History  Past Surgical History:   Procedure Laterality Date    CERVIX SURGERY      COLONOSCOPY N/A 5/31/2016    Procedure: COLONOSCOPY;  Surgeon: Cy Vance MD;  Location: [facility];   Service:    [place] 08/23/19 1200   Note Type   Note type Eval only   Pain Assessment   Pain Assessment No/denies pain   Home Living   Type of [address] [place] [address] One level;Stairs to enter without rails  (2 platform steps to enter)   Prior Function   Level of [place] Independent with ADLs and functional mobility Lives With Spouse  ( and MIL)   Receives Help From Family   ADL Assistance Independent   IADLs Independent   Falls in the last 6 months 0   Vocational Full time employment  ()   Restrictions/Precautions   Wells Zechariah Bearing Precautions Per Order No   Other Precautions Telemetry   General   Family/Caregiver Present Yes  (son)   Cognition   Overall Cognitive Status WFL   Arousal/Participation Cooperative   Orientation Level Oriented X4   Memory Decreased recall of recent events   Following Commands Follows one step commands without difficulty   RLE Assessment   RLE Assessment WFL   LLE Assessment   LLE Assessment WFL  (slightly weaker than R)   Coordination   Movements are Fluid and Coordinated 0   Coordination and Movement Description decreased rate and accuracy with L foot for Serg and HTS   Bed Mobility   Supine to Sit 6  Modified independent   Additional items Bedrails   Additional Comments seated EOB with son at end of session    Transfers   Sit to Stand 4  Minimal assistance  (CGA)   Additional items Assist x 1;Bedrails   Stand to Sit 4  Minimal assistance  (CGA)   Additional items Assist x 1;Bedrails   Ambulation/Elevation   Gait pattern Short stride;Decreased L stance;Decreased foot clearance; Improper Weight shift   Gait Assistance 4  Minimal assist  (CGA-close S)   Assistive Device None   Distance 150'   Stair Management Assistance Not tested   Balance   Static Sitting Good   Dynamic Sitting Good   Static Standing Fair  (passed conditions 1 and 2 MCTSIB)   Dynamic Standing Fair   Ambulatory Fair   Endurance Deficit   Endurance Deficit Yes   Endurance Deficit Description fatigues with household ambulation distances   Activity Tolerance   Activity Tolerance Patient limited by fatigue   Nurse Made Aware ok to see per Katlin CRAIG   Assessment   Prognosis Good   Problem List Decreased strength;Decreased endurance; Impaired balance;Decreased mobility   Assessment Pt is 66 y o  female seen for PT evaluation s/p admit to [facility] on 8/22/2019 w/ Weakness of extremity  PT consulted to assess pt's functional mobility and d/c needs  Order placed for PT eval and tx, w/ activity as tolerated order  Comorbidities affecting pt's physical performance at time of assessment include: HTN, tobacco abuse, DMII, CKD stage 3, prior TIA  PTA, pt was living in a ranch style home with her  and MIL, has 2 platform steps to enter without rail  She was independent with ADLs, IADLs, driving, and working full time as a  at [employer] in \[employer] [place] [employer]\""    Personal factors affecting pt at time of IE include: stairs to enter home, inability to navigate community distances, inability to navigate level surfaces w/o external assistance, inability to perform current job functions and inability to perform IADLs  Please find objective findings from PT assessment regarding body systems outlined above with impairments and limitations including weakness, impaired balance, decreased endurance, impaired coordination, gait deviations and decreased functional mobility tolerance  The following objective measures performed on IE also reveal limitations: Barthel Index: 70/100  Pt's clinical presentation is currently unstable/unpredictable seen in pt's presentation of ongoing medical care for new lacunar CVA and functional mobility significantly below baseline  Pt to benefit from continued PT tx to address deficits as defined above and maximize level of functional independent mobility and consistency  From PT/mobility standpoint, recommendation at time of d/c would be Home PT with family support pending progress in order to facilitate return to PLOF     Barriers to Discharge None   Goals   Patient Goals for my speech to be back to normal   STG Expiration Date 09/02/19   Short Term Goal #1 In 10 days, Gema Cornejo will demonstrate 1) bed mobility at independent level in order to get in/out of bed safely, 2) sit<>stand at S level in order to rise/sit from bed, chair, and complete toileting, 3) ambulate 300 feet with LRAD at S level in order to access their home environment, 4) negotiate 2 stairs without railing at [facility] level in order to safely enter/exit their home, 5) participation in HEP to include but not limited to strengthening, stretching, endurance, balance, and coordination in order to facilitate progress toward the above goals  Treatment Day 0   Plan   Treatment/Interventions Functional transfer training;LE strengthening/ROM; Elevations; Therapeutic exercise; Endurance training;Patient/family training;Equipment eval/education;Gait training   PT Frequency   (3-5x/wk)   Recommendation   Recommendation Home PT  (HHPT, then OPPT)   Equipment Recommended   (none)   PT - OK to Discharge Yes  (with family assist when med appropriate)   Barthel Index   Feeding 10   Bathing 0   Grooming Score 5   Dressing Score 5   Bladder Score 10   Bowels Score 10   Toilet Use Score 10   Transfers (Bed/Chair) Score 10   Mobility (Level Surface) Score 10   Stairs Score 0   Barthel Index Score 70           Brittnee Herron, PT

## 2019-08-23 NOTE — PLAN OF CARE
Problem: Activity Intolerance/Impaired Mobility  Goal: Mobility/activity is maintained at optimum level for patient  Description  Interventions:  - Assess and monitor patient  barriers to mobility and need for assistive/adaptive devices  - Collaborate with interdisciplinary team and initiate plans and interventions as ordered  - Encourage independent activity and early ambulation       Outcome: Progressing     Problem: DISCHARGE PLANNING  Goal: Discharge to home or other facility with appropriate resources  Description  INTERVENTIONS:  - Arrange for needed discharge resources   - Identify discharge learning needs (meds, wound care, etc )  - Refer to Case Management Department for coordinating discharge planning if the patient needs post-hospital services based on physician/advanced practitioner order or complex needs related to functional status, cognitive ability, or social support system   Outcome: Progressing     Problem: Knowledge Deficit  Goal: Patient/family/caregiver demonstrates understanding of disease process, treatment plan, medications, and discharge instructions  Description  Complete learning assessment and assess knowledge base    Interventions:  - Provide teaching at level of understanding  - Provide teaching via preferred learning methods  Outcome: Progressing     Problem: PAIN - ADULT  Goal: Verbalizes/displays adequate comfort level or baseline comfort level  Description  Interventions:  - Encourage patient to monitor pain and request assistance  - Assess pain using appropriate pain scale  - Administer analgesics based on type and severity of pain and evaluate response  - Implement non-pharmacological measures as appropriate and evaluate response  - Consider cultural and social influences on pain and pain management  - Notify physician/advanced practitioner if interventions unsuccessful or patient reports new pain  Outcome: Progressing     Problem: INFECTION - ADULT  Goal: Absence or prevention of progression during hospitalization  Description  INTERVENTIONS:  - Assess and monitor for signs and symptoms of infection  - Monitor lab/diagnostic results  - Monitor all insertion sites, i e  indwelling lines, tubes, and drains  - Monitor endotracheal if appropriate and nasal secretions for changes in amount and color  - [place] appropriate cooling/warming therapies per order  - Administer medications as ordered  - Instruct and encourage patient and family to use good hand hygiene technique  - Identify and instruct in appropriate isolation precautions for identified infection/condition  Outcome: Progressing  Goal: Absence of fever/infection during neutropenic period  Description  INTERVENTIONS:  - Monitor WBC    Outcome: Progressing     Problem: SAFETY ADULT  Goal: Patient will remain free of falls  Description  INTERVENTIONS:  - Assess patient frequently for physical needs  -  Identify cognitive and physical deficits and behaviors that affect risk of falls    -  [place] fall precautions as indicated by assessment   - Educate patient/family on patient safety including physical limitations  - Instruct patient to call for assistance with activity based on assessment  - Modify environment to reduce risk of injury  - Consider OT/PT consult to assist with strengthening/mobility  Outcome: Progressing  Goal: Maintain or return to baseline ADL function  Description  INTERVENTIONS:  -  Assess patient's ability to carry out ADLs; assess patient's baseline for ADL function and identify physical deficits which impact ability to perform ADLs (bathing, care of mouth/teeth, toileting, grooming, dressing, etc )  - Assess/evaluate cause of self-care deficits   - Assess range of motion  - Assess patient's mobility; develop plan if impaired  - Assess patient's need for assistive devices and provide as appropriate  - Encourage maximum independence but intervene and supervise when necessary  - Involve family in performance of ADLs  - Assess for home care needs following discharge   - Consider OT consult to assist with ADL evaluation and planning for discharge  - Provide patient education as appropriate  Outcome: Progressing  Goal: Maintain or return mobility status to optimal level  Description  INTERVENTIONS:  - Assess patient's baseline mobility status (ambulation, transfers, stairs, etc )    - Identify cognitive and physical deficits and behaviors that affect mobility  - Identify mobility aids required to assist with transfers and/or ambulation (gait belt, sit-to-stand, lift, walker, cane, etc )  - [place] fall precautions as indicated by assessment  - Record patient progress and toleration of activity level on Mobility SBAR; progress patient to next Phase/Stage  - Instruct patient to call for assistance with activity based on assessment  - Consider rehabilitation consult to assist with strengthening/weightbearing, etc   Outcome: Progressing

## 2019-08-23 NOTE — PROGRESS NOTES
Progress Note - Neurology   Arlene Brewer 66 y o  female [id number]  Unit/Bed#: [facility] /E4 MS 65-*    Assessment:  3 66year-old with a history of vascular risk factors who is admitted with a subacute R ventral sachin lacunar infarction, unclear etiology, will obtain echocardiogram and CTA neck to further evaluate  Plan:  - Continue ASA 81mg  - Continue Lipitor 40mg  - Echocardiogram pending  - Telemetry  - Smoking cessation advised  - PT/OT/ST  - Neuro checks  - Stroke education  - Notify Neurology with any changes in neuro examination  - Patient will need outpatient follow up in the stroke clinic  Communication has been sent to the office  Results:  1  MRI brain: Early subacute lacunar infarct in the right ventral sachin  No mass effect or hemorrhage  Extensive white matter lesions, suggesting microangiopathic disease, advanced for age  2  MRA head: Limited visualization of the intradural segments of the vertebral arteries with possible stenosis of the left vertebral artery  Right vertebral artery may be hypoplastic or occluded  Recommend CT or MR angiogram of the neck  Subjective:   Patient reports that her symptoms are unchanged  Patient reports that prior to admission she was smoking one pack per day for over forty years  She reports that she ambulated today and still felt slightly off balance  She denies chest pain, shortness of breath, abdominal pain, and changes in vision      Past Medical History:   Diagnosis Date    Acute renal failure (ARF) (HCC)     Anemia     Asthma     Atopic dermatitis     Chronic kidney disease     Stage 3    Colon polyps     Diabetes mellitus (HCC)     type 2    Disease of thyroid gland     hypothyroidism    Diverticulosis     Edema of both legs     Gout     H/O colonoscopy     H/O mammogram     Hemorrhoids     Hyperlipidemia     Hypertension     Nephrosclerosis     Pain in unspecified foot     Peripheral neuropathy     Phlebitis     Venous stasis dermatitis of both lower extremities     Wrist joint pain     LEFT     Past Surgical History:   Procedure Laterality Date    CERVIX SURGERY      COLONOSCOPY N/A 5/31/2016    Procedure: COLONOSCOPY;  Surgeon: Rocío Currie MD;  Location: [facility]; Service:     VEIN SURGERY       Family History   Problem Relation Age of Onset    Diabetes Mother         Type 2 as per allscripts    Heart attack Father     Diabetes type II Sister     Hypertension Other      Social History     Socioeconomic History    Marital status: /Civil Union     Spouse name: None    Number of children: None    Years of education: None    Highest education level: None   Occupational History    None   Social Needs    Financial resource strain: None    Food insecurity:     Worry: None     Inability: None    Transportation needs:     Medical: None     Non-medical: None   Tobacco Use    Smoking status: Current Every Day Smoker     Packs/day: 0 50     Years: 15 00     Pack years: 7 50     Types: Cigarettes    Smokeless tobacco: Never Used    Tobacco comment: 30   Substance and Sexual Activity    Alcohol use: Yes     Comment: social    Drug use: No    Sexual activity: Yes     Birth control/protection: Post-menopausal   Lifestyle    Physical activity:     Days per week: None     Minutes per session: None    Stress: None   Relationships    Social connections:     Talks on phone: None     Gets together: None     Attends Jain service: None     Active member of club or organization: None     Attends meetings of clubs or organizations: None     Relationship status: None    Intimate partner violence:     Fear of current or ex partner: None     Emotionally abused: None     Physically abused: None     Forced sexual activity: None   Other Topics Concern    None   Social History Narrative    Caffeine use    Daily caffeine consumption, 1 serving a day    Uses safety equipment- seatbelts     Medications:   All current active meds have been reviewed and current meds:  Scheduled Meds:  Current Facility-Administered Medications:  aspirin 325 mg Oral Daily Teri Cool, DO   atorvastatin 40 mg Oral Daily [employer]   cloNIDine 0 1 mg Oral Q8H PRN Teri Cool, DO   gabapentin 400 mg Oral TID Teri Cool, DO   heparin (porcine) 5,000 Units Subcutaneous [facility] Teri Cool, DO   hydrALAZINE 50 mg Oral [facility] Teri Cool, DO   levothyroxine 137 mcg Oral Early Morning Teri Cool, DO   nicotine 1 patch Transdermal Daily Teri oCol, DO     Continuous Infusions:   PRN Meds: cloNIDine     ROS:   A 12 point ROS was completed  Other than the above mentioned complaints in the HPI, all remaining systems were negative  Vitals:   /66 (BP Location: Right arm)   Pulse 58   Temp 97 7 °F (36 5 °C) (Tympanic)   Resp 18   Ht 5' 9" (1 753 m)   Wt 88 9 kg (195 lb 15 8 oz)   SpO2 98%   BMI 28 94 kg/m²     Physical Exam:   Physical Exam   Constitutional: No distress  Female resting comfortably in bed  HENT:   Head: Normocephalic and atraumatic  Right Ear: External ear normal    Left Ear: External ear normal    Nose: Nose normal    Mouth/Throat: Oropharynx is clear and moist    Eyes: Pupils are equal, round, and reactive to light  EOM are normal    Cardiovascular: Normal rate and normal heart sounds  Pulmonary/Chest: Effort normal  No respiratory distress  Neurological: She is alert  She has an abnormal Heel to [employer]  She has a normal Finger-Nose-Finger Test    Skin: Skin is warm and dry  She is not diaphoretic  Psychiatric: Judgment and thought content normal      Neurologic Exam     Mental Status   Patient is alert and follows all commands appropriately     Cranial Nerves     CN III, IV, VI   Pupils are equal, round, and reactive to light  Extraocular motions are normal    Nystagmus: none   Conjugate gaze: present    CN V   Facial sensation intact       CN VII   Left facial weakness: Decreased left nasolabial fold  CN VIII   Hearing: intact    CN XII   CN XII normal      Motor Exam   Muscle bulk: normal  Overall muscle tone: normal  Right arm pronator drift: absent  Left arm pronator drift: present    Strength   Strength 5/5 except as noted  Left biceps 5-/5       Sensory Exam   Light touch normal      Gait, Coordination, and Reflexes     Coordination   Finger to nose coordination: normal  Heel to shin coordination: abnormal    Tremor   Resting tremor: absent    Labs: I have personally reviewed pertinent reports  Recent Results (from the past 24 hour(s))   Troponin I    Collection Time: 08/22/19 12:45 PM   Result Value Ref Range    Troponin I <0 02 <=0 04 ng/mL   Troponin I    Collection Time: 08/22/19  4:34 PM   Result Value Ref Range    Troponin I <0 02 <=0 04 ng/mL   Platelet count    Collection Time: 08/22/19  4:34 PM   Result Value Ref Range    Platelets 973 666 - 935 Thousands/uL    MPV 11 4 8 9 - 12 7 fL   Lipid panel    Collection Time: 08/23/19  5:24 AM   Result Value Ref Range    Cholesterol 181 50 - 200 mg/dL    Triglycerides 158 (H) <=150 mg/dL    HDL, Direct 45 40 - 60 mg/dL    LDL Calculated 104 (H) 0 - 100 mg/dL    Non-HDL-Chol (CHOL-HDL) 136 mg/dl   Hemoglobin A1C w/ EAG Estimation    Collection Time: 08/23/19  5:24 AM   Result Value Ref Range    Hemoglobin A1C 6 2 4 2 - 6 3 %     mg/dl   Basic metabolic panel    Collection Time: 08/23/19  5:24 AM   Result Value Ref Range    Sodium 145 136 - 145 mmol/L    Potassium 3 5 3 5 - 5 3 mmol/L    Chloride 110 (H) 100 - 108 mmol/L    CO2 25 21 - 32 mmol/L    ANION GAP 10 4 - 13 mmol/L    BUN 38 (H) 5 - 25 mg/dL    Creatinine 1 20 0 60 - 1 30 mg/dL    Glucose 120 65 - 140 mg/dL    Calcium 8 8 8 3 - 10 1 mg/dL    eGFR 49 ml/min/1 73sq m     Imaging: I have personally reviewed pertinent imaging and I have personally reviewed PACS reports  EKG, Pathology, and Other Studies: I have personally reviewed pertinent reports       VTE Prophylaxis: Sequential compression device (Venodyne)  and Heparin    Total time spent today 30 minutes  Greater than 50% of total time was spent with the patient and / or family counseling and / or coordination of care  A description of the counseling / coordination of care: Discussed with patient: risk factor modification, upcoming imaging studies, MRI results, and plan of care

## 2019-08-23 NOTE — PLAN OF CARE
Problem: OCCUPATIONAL THERAPY ADULT  Goal: Performs self-care activities at highest level of function for planned discharge setting  See evaluation for individualized goals  Description  Treatment Interventions: ADL retraining, UE strengthening/ROM, Functional transfer training, Endurance training, Cognitive reorientation, Patient/family training, Equipment evaluation/education, Compensatory technique education, Energy conservation, Activityengagement          See flowsheet documentation for full assessment, interventions and recommendations  Note:   Limitation: Decreased ADL status, Decreased UE strength, Decreased Safe judgement during ADL, Decreased endurance, Decreased self-care trans, Decreased high-level ADLs, Decreased cognition, Decreased fine motor control  Prognosis: Good  Assessment: Pt is a 66 y o  female seen for OT evaluation s/p admit to [facility] on 8/22/2019 w/ L UE/LE weakness, dysarthria  MRI brain:  Early subacute lacunar infarct in the right ventral sachin  No mass effect or hemorrhage  Comorbidities affecting pt's functional performance at time of assessment include: HTN, tobacco abuse, DM II, CKD III, chest pain  Personal factors affecting pt at time of IE include:requires continued stroke education  Prior to admission, pt was living w/ spouse and reports independent w/ ADLs, independent w/ functional transfers and mobility w/ no AD, independent w/ IADLs, driving and working as a   Upon evaluation: Pt requires MOD I bed mobility, supervision sit<>Stand w/ VCs, supervision functional mobility w/ no AD, supervision LB ADLs, MOD I UB ADLs, supervision toileting 2* the following deficits impacting occupational performance: slight decreased strength L UE and  strength (4-/5), increased time for coordination tasks w/ L UE, slightly impaired balance, dysarthria, decreased endurance, impaired activity tolerance   Pt educated on functional activities such as using L UE to put items away in home, reaching for items, squeezing washcloth to improved coordination and strength L UE, pt verbalized understanding  Reviewed BEFAST w/ pt and pt verbalized understanding  Pt to benefit from continued skilled OT tx while in the hospital to address deficits as defined above and maximize level of functional independence w ADL's and functional mobility  Occupational Performance areas to address include: grooming, bathing/shower, toilet hygiene, dressing, health maintenance, functional mobility and clothing management, coordination and UE strengthening tasks  From OT standpoint, recommendation at time of d/c would be home w/ outpatient OT and family support        OT Discharge Recommendation: Outpatient OT  OT - OK to Discharge: (when medically stable)

## 2019-08-23 NOTE — UTILIZATION REVIEW
Initial Clinical Review    Admission: Date/Time/Statement: Inpatient Admission Orders (From admission, onward)     Ordered        08/22/19 1022  Inpatient Admission (expected length of stay for this patient Order details is greater than two midnights)  Once                   Orders Placed This Encounter   Procedures    Inpatient Admission (expected length of stay for this patient Order details is greater than two midnights)     Standing Status:   Standing     Number of Occurrences:   1     Order Specific Question:   Admitting Physician     Answer:   Rhonda Porras     Order Specific Question:   Level of Care     Answer:   Med Surg [16]     Order Specific Question:   Estimated length of stay     Answer:   More than 2 Midnights     Order Specific Question:   Certification     Answer:   I certify that inpatient services are medically necessary for this patient for a duration of greater than two midnights  See H&P and MD Progress Notes for additional information about the patient's course of treatment  ED Arrival Information     Expected Arrival Acuity Means of Arrival Escorted By Service Admission Type    - 8/22/2019 07:23 Emergent Walk-In Self General Medicine Emergency    Arrival Complaint    chest pain        Chief Complaint   Patient presents with    Chest Pain     pt c/o chest pain since she woke up this morning at 0500  describes the pain as a tightness and pressure  also c/o bilateral hand numbness since monday  denies cardiac hx  Assessment/Plan: 66year old female to the ED from home with complaints of chest pain which started 3 hours prior to her arrivalin addition to speech difficulty and left hand weakness for 3 days  Admitted to inpatient for TIA vs CVA  SHe has GCS=15, nonfocal neuro exam   NIHSS in the ED is 0  Not a tpa candidate as symptoms have resolved     Per Neuro consult: Together with her reported prior problems with dysarthria, this is fairly suggestive small vessel infarction, either right thalamus, left cerebellar or brainstem  Check MRI, routine neuro checks, ECHO, tele   She reports that she is still experiencing LUE and LLE weakness and a headache  Expect greater than 2 midnight stay  ED Triage Vitals   Temperature Pulse Respirations Blood Pressure SpO2   08/22/19 0733 08/22/19 0733 08/22/19 0733 08/22/19 0733 08/22/19 0733   98 5 °F (36 9 °C) 58 16 (!) 173/79 97 %      Temp Source Heart Rate Source Patient Position - Orthostatic VS BP Location FiO2 (%)   08/22/19 0733 08/22/19 0843 08/22/19 0843 08/22/19 0843 --   Oral Monitor Lying Right arm       Pain Score       08/22/19 0731       8        Wt Readings from Last 1 Encounters:   08/22/19 88 9 kg (195 lb 15 8 oz)     Additional Vital Signs:   Neuro checks: Neurological/Vitals     Row Name   08/23/19  0800   08/23/19  0445   Neurological Assessment   Level of Consciousness   Alert/awake   Alert/awake   Orientation Level   Oriented X4   Oriented X4   Cognition   Appropriate for  developmental  age   Appropriate for  developmental  age   Extraocular Movements   Full   Full   Right Upper Visual Fields   Intact   Intact   Left Upper Visual Fields   Intact   Intact   Right Lower Visual Fields   Intact   Intact   Left Lower Visual Fields   Intact   Intact   Speech   Clear   Clear   Facial Symmetry   Symmetrical   Symmetrical   Tongue Deviation   Midline   Midline   R Pupil Size (mm)   3   3   R Pupil Reaction   Brisk   Brisk   L Pupil Size (mm)   3   3   L Pupil Reaction   Brisk   Brisk   R Hand    Strong   Strong   L Hand    Strong   Strong   RUE Muscle Strength   5- Normal stre-  ngth   5- Normal stre-  ngth   LUE Muscle Strength   5- Normal stre-  ngth   5- Normal stre-  ngth   RLE Muscle Strength   5- Normal stre-  ngth   5- Normal stre-  ngth   LLE Muscle Strength   5- Normal stre-  ngth   5- Normal stre-  ngth   RUE Sensation   Full sensation   Full sensation   LUE Sensation   Full sensation   Full sensation   RLE Sensation   Full sensation   Full sensation   LLE Sensation   Full sensation   Full sensation   [place] Coma Scale   Eye Opening   4   4   Best Verbal Response   5   5   Best Motor Response   6   6   [place] Coma Scale Score   15          Date/Time  Temp  Pulse  Resp  BP  SpO2  O2 Device  Patient Position - Orthostatic VS   08/23/19 0700  97 7 °F (36 5 °C)  58  18  148/66  98 %  None (Room air)  Lying   08/23/19 0300  97 8 °F (36 6 °C)  57  16  145/79  97 %  None (Room air)  Lying   08/22/19 1845  97 7 °F (36 5 °C)  76  18  138/61  96 %  None (Room air)  Lying   08/22/19 1645  98 4 °F (36 9 °C)  60  18  175/86Abnormal   96 %  None (Room air)  Lying   08/22/19 1546  97 8 °F (36 6 °C)  67  19  175/93Abnormal   98 %  None (Room air)  Sitting   08/22/19 1325    75  16  162/110Abnormal   97 %  None (Room air)  Lying   08/22/19 1322    73  14  158/70  98 %    Lying   08/22/19 1234    63  14  158/70  100 %  None (Room air)  Lying   08/22/19 1056    64  16  168/78             Pertinent Labs/Diagnostic Test Results:   MRA brain: Limited visualization of the intradural segments of the vertebral arteries with possible stenosis of the left vertebral artery   Right vertebral artery may be hypoplastic or occluded  MRI brain 8/22: Early subacute lacunar infarct in the right ventral sachin   No mass effect or hemorrhage  Extensive white matter lesions, suggesting microangiopathic disease, advanced for age  CT head:No acute intracranial hemorrhage seen  No CT signs of acute territorial infarction    EKG:   Interpretation:     Interpretation: normal    Rate:     ECG rate:  63    ECG rate assessment: normal    Rhythm:     Rhythm: sinus rhythm    Ectopy:     Ectopy: PAC    QRS:     QRS axis:  Normal    QRS intervals:  Normal  Conduction:     Conduction: normal    ST segments:     ST segments:  Normal  T waves:     T waves: normal       Results from last 7 days   Lab Units 08/22/19  1634 08/22/19  0738   WBC Thousand/uL  --  8 43 HEMOGLOBIN g/dL  --  12 3   HEMATOCRIT %  --  39 1   PLATELETS Thousands/uL 210 232   NEUTROS ABS Thousands/µL  --  5 84         Results from last 7 days   Lab Units 08/23/19  0524 08/22/19  0738   SODIUM mmol/L 145 143   POTASSIUM mmol/L 3 5 3 6   CHLORIDE mmol/L 110* 106   CO2 mmol/L 25 24   ANION GAP mmol/L 10 13   BUN mg/dL 38* 45*   CREATININE mg/dL 1 20 1 77*   EGFR ml/min/1 73sq m 49 31   CALCIUM mg/dL 8 8 8 7     Results from last 7 days   Lab Units 08/22/19  0738   AST U/L 19   ALT U/L 27   ALK PHOS U/L 117*   TOTAL PROTEIN g/dL 7 7   ALBUMIN g/dL 3 9   TOTAL BILIRUBIN mg/dL 0 40         Results from last 7 days   Lab Units 08/23/19  0524 08/22/19  0738   GLUCOSE RANDOM mg/dL 120 141*         Results from last 7 days   Lab Units 08/23/19  0524   HEMOGLOBIN A1C % 6 2   EAG mg/dl 131       Results from last 7 days   Lab Units 08/22/19  1634 08/22/19  1245 08/22/19  0738   TROPONIN I ng/mL <0 02 <0 02 <0 02     ED Treatment:   Medication Administration from 08/22/2019 0723 to 08/22/2019 1535       Date/Time Order Dose Route Action     08/22/2019 0901 sodium chloride 0 9 % bolus 1,000 mL 1,000 mL Intravenous New Bag     08/22/2019 1456 hydrALAZINE (APRESOLINE) tablet 50 mg 50 mg Oral Given     08/22/2019 1458 nicotine (NICODERM CQ) 21 mg/24 hr TD 24 hr patch 1 patch 1 patch Transdermal Medication Applied     08/22/2019 1501 heparin (porcine) subcutaneous injection 5,000 Units 5,000 Units Subcutaneous Given     08/22/2019 1259 aspirin tablet 325 mg 325 mg Oral Given        Past Medical History:   Diagnosis Date    Acute renal failure (ARF) (HCC)     Anemia     Asthma     Atopic dermatitis     Chronic kidney disease     Stage 3    Colon polyps     Diabetes mellitus ([address] )     type 2    Disease of thyroid gland     hypothyroidism    Diverticulosis     Edema of both legs     Gout     H/O colonoscopy     H/O mammogram     Hemorrhoids     Hyperlipidemia     Hypertension     Nephrosclerosis     Pain in unspecified foot     Peripheral neuropathy     Phlebitis     Venous stasis dermatitis of both lower extremities     Wrist joint pain     LEFT       Admitting Diagnosis: TIA (transient ischemic attack) [G45 9]  Chest pain [R07 9]  Acute renal insufficiency [N28 9]  Chest pain, rule out acute myocardial infarction [R07 9]  Difficulty with speech [R47 9]  Age/Sex: 66 y o  female  Admission Orders:  Neuro checks every 4 hours  Tele  SCDS  Troponin every 3 hours  ECHO  Current Facility-Administered Medications:  [START ON 8/24/2019] aspirin 81 mg Oral Daily   atorvastatin 40 mg Oral Daily With Dinner   cloNIDine 0 1 mg Oral Q8H PRN   gabapentin 400 mg Oral TID   heparin (porcine) 5,000 Units Subcutaneous Q8H [address]   hydrALAZINE 50 mg Oral Q8H [address]   levothyroxine 137 mcg Oral Early Morning   nicotine 1 patch Transdermal Daily       IP CONSULT TO NEUROLOGY    Network Utilization Review Department  Phone: [phone]; Fax [phone]  [email or web address]  ATTENTION: Please call with any questions or concerns to [phone]  and carefully listen to the prompts so that you are directed to the right person  Send all requests for admission clinical reviews, approved or denied determinations and any other requests to fax [phone]   All voicemails are confidential

## 2019-08-24 VITALS
WEIGHT: 195.99 LBS | HEART RATE: 63 BPM | BODY MASS INDEX: 29.03 KG/M2 | SYSTOLIC BLOOD PRESSURE: 122 MMHG | HEIGHT: 69 IN | TEMPERATURE: 98.2 F | DIASTOLIC BLOOD PRESSURE: 80 MMHG | RESPIRATION RATE: 18 BRPM | OXYGEN SATURATION: 99 %

## 2019-08-24 PROCEDURE — 99239 HOSP IP/OBS DSCHRG MGMT >30: CPT | Performed by: INTERNAL MEDICINE

## 2019-08-24 PROCEDURE — 99232 SBSQ HOSP IP/OBS MODERATE 35: CPT | Performed by: PSYCHIATRY & NEUROLOGY

## 2019-08-24 RX ORDER — ACETAMINOPHEN 325 MG/1
650 TABLET ORAL EVERY 6 HOURS PRN
Status: DISCONTINUED | OUTPATIENT
Start: 2019-08-24 | End: 2019-08-24 | Stop reason: HOSPADM

## 2019-08-24 RX ORDER — ASPIRIN 81 MG/1
81 TABLET ORAL DAILY
Refills: 0
Start: 2019-08-25 | End: 2020-09-29

## 2019-08-24 RX ORDER — ATORVASTATIN CALCIUM 40 MG/1
40 TABLET, FILM COATED ORAL
Qty: 30 TABLET | Refills: 0 | Status: SHIPPED | OUTPATIENT
Start: 2019-08-24 | End: 2019-08-29 | Stop reason: SDUPTHER

## 2019-08-24 RX ADMIN — ASPIRIN 81 MG: 81 TABLET, COATED ORAL at 08:52

## 2019-08-24 RX ADMIN — NICOTINE 1 PATCH: 21 PATCH TRANSDERMAL at 08:52

## 2019-08-24 RX ADMIN — HYDRALAZINE HYDROCHLORIDE 50 MG: 25 TABLET ORAL at 13:45

## 2019-08-24 RX ADMIN — ACETAMINOPHEN 650 MG: 325 TABLET ORAL at 06:13

## 2019-08-24 RX ADMIN — GABAPENTIN 400 MG: 400 CAPSULE ORAL at 08:52

## 2019-08-24 RX ADMIN — HEPARIN SODIUM 5000 UNITS: 5000 INJECTION INTRAVENOUS; SUBCUTANEOUS at 05:59

## 2019-08-24 RX ADMIN — HYDRALAZINE HYDROCHLORIDE 50 MG: 25 TABLET ORAL at 05:59

## 2019-08-24 RX ADMIN — LEVOTHYROXINE SODIUM 137 MCG: 112 TABLET ORAL at 05:59

## 2019-08-24 NOTE — PLAN OF CARE
Problem: Activity Intolerance/Impaired Mobility  Goal: Mobility/activity is maintained at optimum level for patient  Description  Interventions:  - Assess and monitor patient  barriers to mobility and need for assistive/adaptive devices  - Collaborate with interdisciplinary team and initiate plans and interventions as ordered  - Encourage independent activity and early ambulation       Outcome: Progressing     Problem: DISCHARGE PLANNING  Goal: Discharge to home or other facility with appropriate resources  Description  INTERVENTIONS:  - Arrange for needed discharge resources   - Identify discharge learning needs (meds, wound care, etc )  - Refer to Case Management Department for coordinating discharge planning if the patient needs post-hospital services based on physician/advanced practitioner order or complex needs related to functional status, cognitive ability, or social support system   Outcome: Progressing     Problem: Knowledge Deficit  Goal: Patient/family/caregiver demonstrates understanding of disease process, treatment plan, medications, and discharge instructions  Description  Complete learning assessment and assess knowledge base    Interventions:  - Provide teaching at level of understanding  - Provide teaching via preferred learning methods  Outcome: Progressing     Problem: PAIN - ADULT  Goal: Verbalizes/displays adequate comfort level or baseline comfort level  Description  Interventions:  - Encourage patient to monitor pain and request assistance  - Assess pain using appropriate pain scale  - Administer analgesics based on type and severity of pain and evaluate response  - Implement non-pharmacological measures as appropriate and evaluate response  - Consider cultural and social influences on pain and pain management  - Notify physician/advanced practitioner if interventions unsuccessful or patient reports new pain  Outcome: Progressing     Problem: INFECTION - ADULT  Goal: Absence or prevention of progression during hospitalization  Description  INTERVENTIONS:  - Assess and monitor for signs and symptoms of infection  - Monitor lab/diagnostic results  - Monitor all insertion sites, i e  indwelling lines, tubes, and drains  - Monitor endotracheal if appropriate and nasal secretions for changes in amount and color  - [place] appropriate cooling/warming therapies per order  - Administer medications as ordered  - Instruct and encourage patient and family to use good hand hygiene technique  - Identify and instruct in appropriate isolation precautions for identified infection/condition  Outcome: Progressing     Problem: SAFETY ADULT  Goal: Patient will remain free of falls  Description  INTERVENTIONS:  - Assess patient frequently for physical needs  -  Identify cognitive and physical deficits and behaviors that affect risk of falls    -  [place] fall precautions as indicated by assessment   - Educate patient/family on patient safety including physical limitations  - Instruct patient to call for assistance with activity based on assessment  - Modify environment to reduce risk of injury  - Consider OT/PT consult to assist with strengthening/mobility  Outcome: Progressing  Goal: Maintain or return to baseline ADL function  Description  INTERVENTIONS:  -  Assess patient's ability to carry out ADLs; assess patient's baseline for ADL function and identify physical deficits which impact ability to perform ADLs (bathing, care of mouth/teeth, toileting, grooming, dressing, etc )  - Assess/evaluate cause of self-care deficits   - Assess range of motion  - Assess patient's mobility; develop plan if impaired  - Assess patient's need for assistive devices and provide as appropriate  - Encourage maximum independence but intervene and supervise when necessary  - Involve family in performance of ADLs  - Assess for home care needs following discharge   - Consider OT consult to assist with ADL evaluation and planning for discharge  - Provide patient education as appropriate  Outcome: Progressing  Goal: Maintain or return mobility status to optimal level  Description  INTERVENTIONS:  - Assess patient's baseline mobility status (ambulation, transfers, stairs, etc )    - Identify cognitive and physical deficits and behaviors that affect mobility  - Identify mobility aids required to assist with transfers and/or ambulation (gait belt, sit-to-stand, lift, walker, cane, etc )  - [place] fall precautions as indicated by assessment  - Record patient progress and toleration of activity level on Mobility SBAR; progress patient to next Phase/Stage  - Instruct patient to call for assistance with activity based on assessment  - Consider rehabilitation consult to assist with strengthening/weightbearing, etc   Outcome: Progressing

## 2019-08-24 NOTE — DISCHARGE SUMMARY
Discharge Summary - Medical Annel Aguayo 66 y o  female MRN: [id number]    [address] [address] MED SURG Room / Bed: [id number] Encounter: [id number]    BRIEF OVERVIEW    Admitting Provider: Tawny Siemens, DO  Discharge Provider: Tawny Siemens, DO  Admission Date: 8/22/2019     Discharge Date: No discharge date for patient encounter    Primary Care Physician at Discharge: Lourena Buerger, [address] [place] [address] [phone]    Primary Discharge Diagnosis  Acute CVA    Other Problems Addressed  Patient Active Problem List    Diagnosis Date Noted    Weakness of extremity 08/22/2019    Tobacco abuse 08/22/2019    Screening for breast cancer 06/26/2019    Type 2 diabetes mellitus with stage 3 chronic kidney disease, without long-term current use of insulin ([facility] [address] ) 02/06/2018    Overweight (BMI 25 0-29 9) 02/01/2017    Nummular eczema 06/22/2016    Chronic kidney disease, stage 3 ([facility] [facility] ) 08/26/2015    Chronic gout 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 tophus 08/26/2015    Essential hypertension 09/25/2012    Mixed hyperlipidemia 07/09/2012    Acquired hypothyroidism 07/09/2012       Consulting Providers   Neurology  Therapeutic Operative Procedures Performed  MRI brain early subacute lacunar infarct in the right ventral sachin  CTA right vertebral artery is diffusely hypoplastic normal left vertebral artery normal anterior cervical circulation    Discharge Disposition: home    Allergies  Allergies   Allergen Reactions    Neosporin [Neomycin-Bacitracin Zn-Polymyx]     Niacin And Related     Shellfish-Derived Products      seafood     Diet restrictions:  Low-salt diet  Activity restrictions:  None  Discharge Condition:  Erna Rape  Dr Younger Dose in 1 week  Follow up with consulting providers  [facility]       [address] [place] [address]    Presenting Problem/History of Present Illness  Weakness of extremity  Hospital Course  72-year-old female presents with left-sided weakness and speech difficulty    Acute CVA  workup revealed a lacunar infarct right ventral sachin  She was seen in consultation with Neurology, initiated aspirin and statin  Seen in consultation with PT OT with recommendation for outpatient PT OT  Prescription given to initiate outpatient physical therapy  Stressed lifestyle change to decrease risk factors    Hypertension  Lasix was held blood pressure was stable during admission hydralazine prior to discharge systolic 519 diastolic 80    Dyslipidemia  , Lipitor initiate    Tobacco abuse  patient promises to discontinue usage forever    Diabetes  hemoglobin A1c 6 2, a m  Glucose 120 recommend diet carbohydrate restraint    Discharge  Statement   I spent 35 minutes discharging the patient  This time was spent on the day of discharge  I had direct contact with the patient on the day of discharge  Additional documentation is required if more than 30 minutes were spent on discharge  Discussed discharge and follow-up with patient and Neurology    Discharge instructions/Information to patient and family:   See after visit summary for information provided to patient and family

## 2019-08-24 NOTE — PROGRESS NOTES
Progress Note - Neurology   Alina Lab 66 y o  female [id number]  Unit/Bed#: [facility] /E4 MS 65-*    Assessment:  3 66year-old with a history of vascular risk factors who is admitted with acute R ventral sachin lacunar infarction, likely the result of a small vessel thrombosis  Her CTA neck did confirm diminutive right vertebral early with V 2 segment filling defect-possibly the 2 diminutive size verses thrombosis, with distal reconstitution  2  Tobacco use  3  Zachariah ataxia secondary to #1  Plan:  - Continue ASA 81mg  - Continue Lipitor 40mg  - Echocardiogram pending  As she is clinically stable, this can be performed as an outpatient  - recommend home BP monitoring  - Smoking cessation advised  - continue outpatient PT/OT  - okay to discharge from my standpoint  - Stroke education  - Patient will need outpatient follow up in the stroke clinic  Communication has been sent to the office  Results:  1  MRI brain: Early subacute lacunar infarct in the right ventral sachin  No mass effect or hemorrhage  Extensive white matter lesions, suggesting microangiopathic disease, advanced for age  2  MRA head: Limited visualization of the intradural segments of the vertebral arteries with possible stenosis of the left vertebral artery  Right vertebral artery may be hypoplastic or occluded  Recommend CT or MR angiogram of the neck  3  CTA neck- 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 with nonenhancing portions within the V2 segment  The vessel reconstitutes distally into hypoplastic V3 and V4 segments    Normal left vertebral artery  Normal anterior cervical circulation    Subjective:   Patient reports that her symptoms are somewhat improved  She still notes difficulty with coordinating movements of left-sided limbs, but she has been able to ambulate steadily, without an aid  She does feel like the leg is uncertain    She was concerned with her change in vocal quality, but her dysarthria is really undetectable  She denies chest pain, shortness of breath, abdominal pain, and changes in vision  Past Medical History:   Diagnosis Date    Acute renal failure (ARF) (HCC)     Anemia     Asthma     Atopic dermatitis     Chronic kidney disease     Stage 3    Colon polyps     Diabetes mellitus (HCC)     type 2    Disease of thyroid gland     hypothyroidism    Diverticulosis     Edema of both legs     Gout     H/O colonoscopy     H/O mammogram     Hemorrhoids     Hyperlipidemia     Hypertension     Nephrosclerosis     Pain in unspecified foot     Peripheral neuropathy     Phlebitis     Venous stasis dermatitis of both lower extremities     Wrist joint pain     LEFT     Past Surgical History:   Procedure Laterality Date    CERVIX SURGERY      COLONOSCOPY N/A 5/31/2016    Procedure: COLONOSCOPY;  Surgeon: Hasmukh Atkins MD;  Location: [facility];   Service:     VEIN SURGERY       Family History   Problem Relation Age of Onset    Diabetes Mother         Type 2 as per allscripts    Heart attack Father     Diabetes type II Sister     Hypertension Other      Social History     Socioeconomic History    Marital status: /Civil Union     Spouse name: None    Number of children: None    Years of education: None    Highest education level: None   Occupational History    None   Social Needs    Financial resource strain: None    Food insecurity:     Worry: None     Inability: None    Transportation needs:     Medical: None     Non-medical: None   Tobacco Use    Smoking status: Current Every Day Smoker     Packs/day: 0 50     Years: 15 00     Pack years: 7 50     Types: Cigarettes    Smokeless tobacco: Never Used    Tobacco comment: 30   Substance and Sexual Activity    Alcohol use: Yes     Comment: social    Drug use: No    Sexual activity: Yes     Birth control/protection: Post-menopausal   Lifestyle    Physical activity:     Days per week: None     Minutes per session: None    Stress: None   Relationships    Social connections:     Talks on phone: None     Gets together: None     Attends [employer] service: None     Active member of club or organization: None     Attends meetings of clubs or organizations: None     Relationship status: None    Intimate partner violence:     Fear of current or ex partner: None     Emotionally abused: None     Physically abused: None     Forced sexual activity: None   Other Topics Concern    None   Social History Narrative    Caffeine use    Daily caffeine consumption, 1 serving a day    Uses safety equipment- seatbelts     Medications: All current active meds have been reviewed and current meds:  Scheduled Meds:    Current Facility-Administered Medications:  acetaminophen 650 mg Oral Q6H PRN Oneyda Payan, CRISTINA   aspirin 81 mg Oral Daily Shayy Rodriguez PA-C   atorvastatin 40 mg Oral Daily [employer], CRISTINA   cloNIDine 0 1 mg Oral Q8H PRN Neldon Silvan, DO   gabapentin 400 mg Oral TID Neldon Silvan, DO   heparin (porcine) 5,000 Units Subcutaneous [facility] Neldon Silvan, DO   hydrALAZINE 50 mg Oral [facility] Neldon Silvan, DO   levothyroxine 137 mcg Oral Early Morning Neldon Silvan, DO   nicotine 1 patch Transdermal Daily Neldon Silvan, DO     Continuous Infusions:   PRN Meds:   acetaminophen    cloNIDine     ROS:   A 12 point ROS was completed  Other than the above mentioned complaints in the HPI, all remaining systems were negative  Vitals:   /80 (BP Location: Right arm)   Pulse 63   Temp 98 2 °F (36 8 °C) (Tympanic)   Resp 18   Ht 5' 9" (1 753 m)   Wt 88 9 kg (195 lb 15 8 oz)   SpO2 99%   BMI 28 94 kg/m²     Physical Exam:   Physical Exam   Constitutional: No distress  Female resting comfortably in bed  HENT:   Head: Normocephalic and atraumatic     Right Ear: External ear normal    Left Ear: External ear normal    Nose: Nose normal    Mouth/Throat: Oropharynx is clear and moist    Eyes: Pupils are equal, round, and reactive to light  EOM are normal    Cardiovascular: Normal rate and normal heart sounds  Pulmonary/Chest: Effort normal  No respiratory distress  Neurological: She is alert  She has an abnormal Finger-Nose-Finger Test (Left ataxia) and an abnormal Heel to [employer] (Left ataxia)  Skin: Skin is warm and dry  She is not diaphoretic  Psychiatric: Judgment and thought content normal      Neurologic Exam     Mental Status   Patient is alert and follows all commands appropriately     Cranial Nerves     CN III, IV, VI   Pupils are equal, round, and reactive to light  Extraocular motions are normal    Nystagmus: none   Conjugate gaze: present    CN V   Facial sensation intact  CN VII   Left facial weakness: Decreased left nasolabial fold  CN VIII   Hearing: intact    CN XII   CN XII normal      Motor Exam   Muscle bulk: normal  Overall muscle tone: normal  Right arm pronator drift: absent  Left arm pronator drift: present    Strength   Strength 5/5 except as noted  Left biceps 5-/5       Sensory Exam   Light touch normal      Gait, Coordination, and Reflexes     Coordination   Finger to nose coordination: abnormal (Left ataxia)  Heel to shin coordination: abnormal (Left ataxia)    Tremor   Resting tremor: absent    Labs: I have personally reviewed pertinent reports  No results found for this or any previous visit (from the past 24 hour(s))  Imaging: I have personally reviewed pertinent imaging and I have personally reviewed PACS reports  EKG, Pathology, and Other Studies: I have personally reviewed pertinent reports  VTE Prophylaxis: Sequential compression device (Venodyne)  and Heparin    Total time spent today 16 minutes  Greater than 50% of total time was spent with the patient and / or family counseling and / or coordination of care   A description of the counseling / coordination of care: Discussed with the patient the results of CTA, risk factor reduction, need to continue home monitoring of her blood pressure, outpatient OT and PT, time off work, need for smoking cessation, new prescriptions for aspirin and statin

## 2019-08-26 ENCOUNTER — TELEPHONE (OUTPATIENT)
Dept: NEUROLOGY | Facility: CLINIC | Age: 64
End: 2019-08-26

## 2019-08-26 ENCOUNTER — TRANSITIONAL CARE MANAGEMENT (OUTPATIENT)
Dept: FAMILY MEDICINE CLINIC | Facility: CLINIC | Age: 64
End: 2019-08-26

## 2019-08-26 NOTE — TELEPHONE ENCOUNTER
----- Message from Zahida Bean PA-C sent at 8/23/2019 12:24 PM EDT -----  Regarding: HFU  Diagnosis/Reason for follow-up: R ventral sachin infarction  Subspecialty for follow-up: Stroke Neurology  Existing neurologist: N/A  Recommended timing for HFU: 4-6 weeks  Tests/Labs/Imaging ordered: N/A  AP/Attending: AP  Additional notes: N/A    Thanks!

## 2019-08-29 ENCOUNTER — TELEPHONE (OUTPATIENT)
Dept: NEUROLOGY | Facility: CLINIC | Age: 64
End: 2019-08-29

## 2019-08-29 ENCOUNTER — OFFICE VISIT (OUTPATIENT)
Dept: FAMILY MEDICINE CLINIC | Facility: CLINIC | Age: 64
End: 2019-08-29
Payer: COMMERCIAL

## 2019-08-29 VITALS
DIASTOLIC BLOOD PRESSURE: 84 MMHG | HEIGHT: 69 IN | WEIGHT: 198.4 LBS | SYSTOLIC BLOOD PRESSURE: 128 MMHG | BODY MASS INDEX: 29.38 KG/M2

## 2019-08-29 DIAGNOSIS — G60.9 IDIOPATHIC PERIPHERAL NEUROPATHY: ICD-10-CM

## 2019-08-29 DIAGNOSIS — N18.30 CHRONIC KIDNEY DISEASE, STAGE 3 (HCC): ICD-10-CM

## 2019-08-29 DIAGNOSIS — R29.898 WEAKNESS OF EXTREMITY: ICD-10-CM

## 2019-08-29 DIAGNOSIS — I63.81 RIGHT-SIDED LACUNAR INFARCTION (HCC): Primary | ICD-10-CM

## 2019-08-29 DIAGNOSIS — E11.22 TYPE 2 DIABETES MELLITUS WITH STAGE 3 CHRONIC KIDNEY DISEASE, WITHOUT LONG-TERM CURRENT USE OF INSULIN (HCC): ICD-10-CM

## 2019-08-29 DIAGNOSIS — E78.2 MIXED HYPERLIPIDEMIA: ICD-10-CM

## 2019-08-29 DIAGNOSIS — Z12.39 SCREENING FOR BREAST CANCER: ICD-10-CM

## 2019-08-29 DIAGNOSIS — N18.30 TYPE 2 DIABETES MELLITUS WITH STAGE 3 CHRONIC KIDNEY DISEASE, WITHOUT LONG-TERM CURRENT USE OF INSULIN (HCC): ICD-10-CM

## 2019-08-29 DIAGNOSIS — E03.9 ACQUIRED HYPOTHYROIDISM: ICD-10-CM

## 2019-08-29 DIAGNOSIS — Z72.0 TOBACCO ABUSE: Chronic | ICD-10-CM

## 2019-08-29 DIAGNOSIS — E66.3 OVERWEIGHT (BMI 25.0-29.9): ICD-10-CM

## 2019-08-29 DIAGNOSIS — Z79.899 MEDICATION DOSE CHANGED: ICD-10-CM

## 2019-08-29 DIAGNOSIS — I10 ESSENTIAL HYPERTENSION: ICD-10-CM

## 2019-08-29 PROCEDURE — 99215 OFFICE O/P EST HI 40 MIN: CPT | Performed by: FAMILY MEDICINE

## 2019-08-29 RX ORDER — ATORVASTATIN CALCIUM 40 MG/1
40 TABLET, FILM COATED ORAL
Qty: 90 TABLET | Refills: 1 | Status: SHIPPED | OUTPATIENT
Start: 2019-08-29 | End: 2020-01-07 | Stop reason: SDUPTHER

## 2019-08-29 RX ORDER — ATORVASTATIN CALCIUM 40 MG/1
40 TABLET, FILM COATED ORAL
Qty: 90 TABLET | Refills: 1 | Status: SHIPPED | OUTPATIENT
Start: 2019-08-29 | End: 2019-08-29 | Stop reason: SDUPTHER

## 2019-08-29 RX ORDER — GABAPENTIN 400 MG/1
400 CAPSULE ORAL 3 TIMES DAILY
Qty: 270 CAPSULE | Refills: 1 | Status: SHIPPED | OUTPATIENT
Start: 2019-08-29 | End: 2020-01-07 | Stop reason: SDUPTHER

## 2019-08-29 RX ORDER — NICOTINE 21 MG/24HR
PATCH, TRANSDERMAL 24 HOURS TRANSDERMAL
Qty: 28 PATCH | Refills: 0 | Status: SHIPPED | OUTPATIENT
Start: 2019-08-29 | End: 2020-01-07

## 2019-08-29 NOTE — ASSESSMENT & PLAN NOTE
Will refer patient for physical therapy and see her in one month She will see neurology as scheduled in October

## 2019-08-29 NOTE — TELEPHONE ENCOUNTER
The purpose of this phone call is to assess patient's general wellbeing or for any assistance needed with follow-up care  Called patient since discharge, she has not experienced any new or worsening stroke symptoms  Denies any residual symptoms following hospitalization  States she has returned to baseline  Patient ambulates independently, preforms her own ADLs, also manages her own medications, appointments, and affairs  Patient plans to return to work 9/9  Patient followed up with her PCP today  Her stroke hospital follow up appointment is scheduled 10/11 with Kerry Morrison  I reviewed medications with her  There have not been any changes since discharge  she had no difficulties obtaining medications  Reports taking all as prescribed with no missed doses or medication side effects  Patient denies signs of bleeding  I reviewed stroke symptoms and risk factor management with her  she verbalizes understanding of information  Patient does not monitor her BP at home at this time, states she will be buying a cuff when she goes to the pharmacy today to  her nicotine patch  she continues to be an everyday smoker, she smokes 1/2 a pack a day prior to admission, she is smoking 1 cigarette a day  Patient follows a diabetic diet  Addressed all questions, she does not have any further questions or concerns at this time

## 2019-08-29 NOTE — PROGRESS NOTES
Assessment/Plan:     Type 2 diabetes mellitus with stage 3 chronic kidney disease, without long-term current use of insulin (HCC)  Lab Results   Component Value Date    HGBA1C 6 2 08/23/2019     Sugars are controlled iwht diet Patient to conitnue with diet  No results for input(s): POCGLU in the last 72 hours  Blood Sugar Average: Last 72 hrs:      Acquired hypothyroidism  Decreased medication based on July labs will repeat labs in 4 weeks continue current care    Essential hypertension  Blood pressure is controlled off HCTZ will keep her off that and marc I will see her in one month    Right-sided lacunar infarction [facility])  Will refer patient for physical therapy and see her in one month She will see neurology as scheduled in October    Chronic kidney disease, stage 3 ([facility] [address] )  Renal function is stable conitnue to monitor    Weakness of extremity  Left leg with some weakness on exam Will refer for physical therapy    Tobacco abuse  Start the patches    Overweight (BMI 25 0-29  9)  Continue with diet and recheck in one month    Mixed hyperlipidemia  Continue atrovastatin and check labs in 4 weeks       Diagnoses and all orders for this visit:    Right-sided lacunar infarction ([facility] [address] )    Weakness of extremity    Type 2 diabetes mellitus with stage 3 chronic kidney disease, without long-term current use of insulin (HCC)    Essential hypertension    Acquired hypothyroidism    Chronic kidney disease, stage 3 (HCC)    Tobacco abuse    Mixed hyperlipidemia    Screening for breast cancer    Overweight (BMI 25 0-29  9)         Subjective:     Patient ID: Shara Harley is a 66 y o  female      [facility] Patient noted taht on August 19th she started to "not feel well" She has some numbness in the left arm She felt tired Patient then by 8/22/2019 noticed left arm and left leg were weak and her speech was "slow" Patient went to ER patient had MRI CTA Patient wsa found to have subacute lacunar infarct in the right sachin 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 LDL was 104 and A1c was 6 2 Patient HCTZ was held and BP was good on hydralazine Patient is interested in the nicotine patch to stop smoking now Patient has not set up PT yet Her left arm weakness has resolved left left is still a bit weak She wants to return to work one week from Labor day patient otherwise feels well She has neurology appt in 10/2019      Review of Systems   Constitutional: Negative for fatigue, fever and unexpected weight change  HENT: Negative for congestion, sinus pain and trouble swallowing  Eyes: Negative for discharge and visual disturbance  Respiratory: Negative for cough, chest tightness, shortness of breath and wheezing  Cardiovascular: Negative for chest pain, palpitations and leg swelling  Gastrointestinal: Negative for abdominal pain, blood in stool, constipation, diarrhea, nausea and vomiting  Genitourinary: Negative for difficulty urinating, dysuria, frequency and hematuria  Musculoskeletal: Negative for arthralgias, gait problem and joint swelling  Skin: Negative for rash and wound  Allergic/Immunologic: Negative for environmental allergies and food allergies  Neurological: Positive for weakness  Negative for dizziness, syncope, numbness and headaches  Mild weakness in the left leg   Hematological: Negative for adenopathy  Does not bruise/bleed easily  Psychiatric/Behavioral: Negative for confusion, decreased concentration and sleep disturbance  The patient is not nervous/anxious  Objective:     Physical Exam   Constitutional: She is oriented to person, place, and time  She appears well-developed and well-nourished  HENT:   Head: Normocephalic and atraumatic     Right Ear: Hearing, tympanic membrane and external ear normal    Left Ear: Hearing, tympanic membrane and external ear normal    Eyes: Pupils are equal, round, and reactive to light  Conjunctivae and EOM are normal    Neck: Neck supple  No thyromegaly present  Cardiovascular: Normal rate and normal heart sounds  Pulmonary/Chest: Effort normal and breath sounds normal  She has no wheezes  She has no rales  Abdominal: Soft  Bowel sounds are normal  She exhibits no distension  There is no tenderness  Musculoskeletal: She exhibits no edema or tenderness  Left leg 4/5 strenght    Lymphadenopathy:     She has no cervical adenopathy  Neurological: She is alert and oriented to person, place, and time  No cranial nerve deficit  Coordination normal    Skin: Skin is warm and dry  No rash noted  Psychiatric: She has a normal mood and affect  Her behavior is normal  Judgment and thought content normal    Nursing note and vitals reviewed  Vitals:    08/29/19 0906   BP: 128/84   Weight: 90 kg (198 lb 6 4 oz)   Height: 5' 9" (1 753 m)       Transitional Care Management Review:  Vero Byrne is a 66 y o  female here for TCM follow up  During the TCM phone call patient stated:    TCM Call (since 7/29/2019)     Date and time call was made  8/26/2019 10:58 AM    Hospital care reviewed  Records reviewed    Patient was hospitialized at  [address] [id number]    Date of Admission  08/22/19    Date of discharge  08/24/19    Diagnosis  TIA    Disposition  Home    Were the patients medications reviewed and updated  No    Current Symptoms  None      TCM Call (since 7/29/2019)     Post hospital issues  None    Should patient be enrolled in anticoag monitoring? No    Scheduled for follow up?   Yes    Did you obtain your prescribed medications  Yes    Do you need help managing your prescriptions or medications  No    Is transportation to your appointment needed  No    I have advised the patient to call PCP with any new or worsening symptoms  Juliana Colbert, DO

## 2019-08-29 NOTE — ASSESSMENT & PLAN NOTE
Lab Results   Component Value Date    HGBA1C 6 2 08/23/2019     Sugars are controlled iwht diet Patient to conitnue with diet  No results for input(s): POCGLU in the last 72 hours      Blood Sugar Average: Last 72 hrs:

## 2019-08-29 NOTE — PATIENT INSTRUCTIONS
Self Care Measures After a Stroke   AMBULATORY CARE:   Self-care measures after a stroke  are things you can do to help yourself recover from a stroke  Your healthcare provider will help you develop a plan to care for yourself at home and at work  How you will learn self-care measures:  Rehabilitation (rehab) therapy will help you develop self-care measures  Rehab therapy is an important part of your recovery  You may need any of the following:  · Occupational therapy  teaches you new ways to do daily activities  You may learn new skills to help you bathe, dress, eat, and drive  · Physical therapy  teaches you ways to strengthen your arms, legs, and hands  You learn exercises to improve your balance and movement to decrease your risk of falling  · Speech therapy  teaches you skills needed to talk or swallow  You may be given thickened liquids to help keep you from breathing liquid into your lungs  · Vocational therapy  teaches you skills that will help you return to work  Call 911 for any of the following:   · You have any of the following signs of a heart attack:      ¨ Squeezing, pressure, or pain in your chest that lasts longer than 5 minutes or returns    ¨ Discomfort or pain in your back, neck, jaw, stomach, or arm     ¨ Trouble breathing    ¨ Nausea or vomiting    ¨ Lightheadedness or a sudden cold sweat, especially with chest pain or trouble breathing    · You have any of the following signs of a stroke:      ¨ Numbness or drooping on one side of your face     ¨ Weakness in an arm or leg    ¨ Confusion or difficulty speaking    ¨ Dizziness, a severe headache, or vision loss    ·            · You have trouble breathing  · You have a seizure  Seek care immediately if:   · You fall and hit your head or get injured  Contact your healthcare provider neurologist if:   · You have a fever  · You have a sore on your skin that does not go away      · You have new symptoms, or your symptoms are getting worse  · You have pain that does not go away, even after you take pain medicine  · You feel depressed, anxious, or unable to cope with your condition  · You have questions or concerns about your condition or care  Take your medicines as directed:  Medicines will help you manage pain, anxiety, depression, and problems with movement  Medicine may also help improve your memory and thinking  Practice the skills you learn in therapy:  Your healthcare provider will tell you what skills to practice at home  Practice will help you recover  Stretch and do range of motion exercises to prevent a contracture:  A contracture is a shortening of muscles, tendons, or ligaments after a stroke  Contractures limit movement of a joint, such as your wrist, elbow, shoulder, or ankle  Contractures can start to develop as soon as 1 week after your stroke  Your healthcare provider may show you or your caregiver how to stretch and do ROM exercises  You may need to have someone help you stretch or do ROM exercises  Ask which ROM exercises and stretches are best and how often you should do them  Do not smoke:  Nicotine and other chemicals in cigarettes and cigars can cause blood vessel damage and high blood pressure  This can increase your risk for another stroke  Ask your healthcare provider for information if you currently smoke and need help to quit  E-cigarettes or smokeless tobacco still contain nicotine  Talk to your healthcare provider before you use these products  Limit or do not drink alcohol:  Alcohol use can increase your risk for another stroke  Ask your healthcare provider if it is safe for you to drink alcohol and how much is safe for you to drink  Eat healthy foods: This can decrease your risk for another stroke  Healthy foods include fruits, vegetables, whole-grain breads, low-fat dairy products, beans, lean meats, and fish  Ask if you need to be on a special diet     Exercise as directed: Exercise can help you recover and prevent another stroke  Your healthcare provider can help you find an exercise program that is safe for you  Do not start an exercise program before you talk to your healthcare provider  Practice safety to prevent falls  The following is a list of safe practices:  · Make your home safe  Remove clutter from walkways and paths to the bathroom and kitchen  Install devices in your home to prevent falls such as hand rails or a raised toilet seat  Ask your healthcare provider about other safety devices  · Use assistive devices, such as a cane or walker, to help you walk  · Wear non slip shoes or socks at all times  · Know your limitations  Ask for help when you need it and be patient  Stroke recovery can take time  What you need to know about depression:  Depression can happen after a stroke  Talk to your healthcare provider if you have depression that continues or is getting worse  Your provider may be able to help treat your depression  Your provider can also recommend support groups for you to join  A support group is a place to talk with others who have had a stroke  It may also help to talk to friends and family members about how you are feeling  Tell your family and friends that if they see these signs, to let your healthcare provider know  You may show any of the following signs of depression:  · Extreme sadness    · Avoiding social interaction with family or friends    · A lack of interest in things you once enjoyed    · Irritability    · Trouble sleeping    · Low energy levels    · A change in eating habits or sudden weight gain or loss  Follow up with your healthcare provider or neurologist as directed:  Write down your questions so you remember to ask them during your visits  © 2017 [address] [employer] Information is for End User's use only and may not be sold, redistributed or otherwise used for commercial purposes   All illustrations and images included in [employer] are the copyrighted property of [employer] , [employer]  or [employer]  The above information is an  only  It is not intended as medical advice for individual conditions or treatments  Talk to your doctor, nurse or pharmacist before following any medical regimen to see if it is safe and effective for you

## 2019-10-03 ENCOUNTER — APPOINTMENT (EMERGENCY)
Dept: CT IMAGING | Facility: HOSPITAL | Age: 64
End: 2019-10-03
Payer: COMMERCIAL

## 2019-10-03 ENCOUNTER — HOSPITAL ENCOUNTER (EMERGENCY)
Facility: HOSPITAL | Age: 64
Discharge: HOME | End: 2019-10-03
Attending: EMERGENCY MEDICINE
Payer: COMMERCIAL

## 2019-10-03 VITALS
BODY MASS INDEX: 29.53 KG/M2 | DIASTOLIC BLOOD PRESSURE: 110 MMHG | WEIGHT: 200 LBS | HEART RATE: 78 BPM | RESPIRATION RATE: 16 BRPM | OXYGEN SATURATION: 99 % | TEMPERATURE: 97.5 F | SYSTOLIC BLOOD PRESSURE: 179 MMHG

## 2019-10-03 DIAGNOSIS — M54.50 LOW BACK PAIN: Primary | ICD-10-CM

## 2019-10-03 DIAGNOSIS — K44.9 HIATAL HERNIA: ICD-10-CM

## 2019-10-03 DIAGNOSIS — N83.209 OVARIAN CYST: ICD-10-CM

## 2019-10-03 DIAGNOSIS — R93.5 ABNORMAL CT OF THE ABDOMEN: ICD-10-CM

## 2019-10-03 DIAGNOSIS — K83.8 COMMON BILE DUCT DILATATION: ICD-10-CM

## 2019-10-03 LAB
ALBUMIN SERPL BCP-MCNC: 4.2 G/DL (ref 3.5–5)
ALP SERPL-CCNC: 134 U/L (ref 46–116)
ALT SERPL W P-5'-P-CCNC: 14 U/L (ref 12–78)
ANION GAP SERPL CALCULATED.3IONS-SCNC: 8 MMOL/L (ref 4–13)
APTT PPP: 28 SECONDS (ref 23–37)
AST SERPL W P-5'-P-CCNC: 13 U/L (ref 5–45)
BACTERIA UR QL AUTO: ABNORMAL /HPF
BASOPHILS # BLD AUTO: 0.03 THOUSANDS/ΜL (ref 0–0.1)
BASOPHILS NFR BLD AUTO: 0 % (ref 0–1)
BILIRUB DIRECT SERPL-MCNC: 0.09 MG/DL (ref 0–0.2)
BILIRUB SERPL-MCNC: 0.41 MG/DL (ref 0.2–1)
BILIRUB UR QL STRIP: ABNORMAL
BUN SERPL-MCNC: 28 MG/DL (ref 5–25)
CALCIUM SERPL-MCNC: 9 MG/DL (ref 8.3–10.1)
CHLORIDE SERPL-SCNC: 104 MMOL/L (ref 100–108)
CLARITY UR: CLEAR
CO2 SERPL-SCNC: 27 MMOL/L (ref 21–32)
COLOR UR: YELLOW
CREAT SERPL-MCNC: 1.16 MG/DL (ref 0.6–1.3)
EOSINOPHIL # BLD AUTO: 0.07 THOUSAND/ΜL (ref 0–0.61)
EOSINOPHIL NFR BLD AUTO: 1 % (ref 0–6)
ERYTHROCYTE [DISTWIDTH] IN BLOOD BY AUTOMATED COUNT: 13.7 % (ref 11.6–15.1)
GFR SERPL CREATININE-BSD FRML MDRD: 51 ML/MIN/1.73SQ M
GLUCOSE SERPL-MCNC: 118 MG/DL (ref 65–140)
GLUCOSE UR STRIP-MCNC: NEGATIVE MG/DL
HCT VFR BLD AUTO: 38.9 % (ref 34.8–46.1)
HGB BLD-MCNC: 12.1 G/DL (ref 11.5–15.4)
HGB UR QL STRIP.AUTO: NEGATIVE
HYALINE CASTS #/AREA URNS LPF: ABNORMAL /LPF
IMM GRANULOCYTES # BLD AUTO: 0.06 THOUSAND/UL (ref 0–0.2)
IMM GRANULOCYTES NFR BLD AUTO: 1 % (ref 0–2)
INR PPP: 0.99 (ref 0.84–1.19)
KETONES UR STRIP-MCNC: NEGATIVE MG/DL
LEUKOCYTE ESTERASE UR QL STRIP: NEGATIVE
LIPASE SERPL-CCNC: 269 U/L (ref 73–393)
LYMPHOCYTES # BLD AUTO: 1.29 THOUSANDS/ΜL (ref 0.6–4.47)
LYMPHOCYTES NFR BLD AUTO: 13 % (ref 14–44)
MAGNESIUM SERPL-MCNC: 1.8 MG/DL (ref 1.6–2.6)
MCH RBC QN AUTO: 30.4 PG (ref 26.8–34.3)
MCHC RBC AUTO-ENTMCNC: 31.1 G/DL (ref 31.4–37.4)
MCV RBC AUTO: 98 FL (ref 82–98)
MONOCYTES # BLD AUTO: 0.76 THOUSAND/ΜL (ref 0.17–1.22)
MONOCYTES NFR BLD AUTO: 7 % (ref 4–12)
MUCOUS THREADS UR QL AUTO: ABNORMAL
NEUTROPHILS # BLD AUTO: 8.06 THOUSANDS/ΜL (ref 1.85–7.62)
NEUTS SEG NFR BLD AUTO: 78 % (ref 43–75)
NITRITE UR QL STRIP: NEGATIVE
NON-SQ EPI CELLS URNS QL MICRO: ABNORMAL /HPF
NRBC BLD AUTO-RTO: 0 /100 WBCS
PH UR STRIP.AUTO: 5 [PH] (ref 4.5–8)
PLATELET # BLD AUTO: 239 THOUSANDS/UL (ref 149–390)
PMV BLD AUTO: 11.3 FL (ref 8.9–12.7)
POTASSIUM SERPL-SCNC: 3.5 MMOL/L (ref 3.5–5.3)
PROT SERPL-MCNC: 8.2 G/DL (ref 6.4–8.2)
PROT UR STRIP-MCNC: ABNORMAL MG/DL
PROTHROMBIN TIME: 13.2 SECONDS (ref 11.6–14.5)
RBC # BLD AUTO: 3.98 MILLION/UL (ref 3.81–5.12)
RBC #/AREA URNS AUTO: ABNORMAL /HPF
SODIUM SERPL-SCNC: 139 MMOL/L (ref 136–145)
SP GR UR STRIP.AUTO: 1.02 (ref 1–1.03)
UROBILINOGEN UR QL STRIP.AUTO: 0.2 E.U./DL
WBC # BLD AUTO: 10.27 THOUSAND/UL (ref 4.31–10.16)
WBC #/AREA URNS AUTO: ABNORMAL /HPF

## 2019-10-03 PROCEDURE — 81001 URINALYSIS AUTO W/SCOPE: CPT

## 2019-10-03 PROCEDURE — 99284 EMERGENCY DEPT VISIT MOD MDM: CPT

## 2019-10-03 PROCEDURE — 85610 PROTHROMBIN TIME: CPT | Performed by: EMERGENCY MEDICINE

## 2019-10-03 PROCEDURE — 74177 CT ABD & PELVIS W/CONTRAST: CPT

## 2019-10-03 PROCEDURE — 36415 COLL VENOUS BLD VENIPUNCTURE: CPT | Performed by: EMERGENCY MEDICINE

## 2019-10-03 PROCEDURE — 99285 EMERGENCY DEPT VISIT HI MDM: CPT | Performed by: EMERGENCY MEDICINE

## 2019-10-03 PROCEDURE — 96361 HYDRATE IV INFUSION ADD-ON: CPT

## 2019-10-03 PROCEDURE — 80048 BASIC METABOLIC PNL TOTAL CA: CPT | Performed by: EMERGENCY MEDICINE

## 2019-10-03 PROCEDURE — 83735 ASSAY OF MAGNESIUM: CPT | Performed by: EMERGENCY MEDICINE

## 2019-10-03 PROCEDURE — 85730 THROMBOPLASTIN TIME PARTIAL: CPT | Performed by: EMERGENCY MEDICINE

## 2019-10-03 PROCEDURE — 85025 COMPLETE CBC W/AUTO DIFF WBC: CPT | Performed by: EMERGENCY MEDICINE

## 2019-10-03 PROCEDURE — 80076 HEPATIC FUNCTION PANEL: CPT | Performed by: EMERGENCY MEDICINE

## 2019-10-03 PROCEDURE — 96374 THER/PROPH/DIAG INJ IV PUSH: CPT

## 2019-10-03 PROCEDURE — 96375 TX/PRO/DX INJ NEW DRUG ADDON: CPT

## 2019-10-03 PROCEDURE — 83690 ASSAY OF LIPASE: CPT | Performed by: EMERGENCY MEDICINE

## 2019-10-03 RX ORDER — FENTANYL CITRATE 50 UG/ML
25 INJECTION, SOLUTION INTRAMUSCULAR; INTRAVENOUS ONCE
Status: COMPLETED | OUTPATIENT
Start: 2019-10-03 | End: 2019-10-03

## 2019-10-03 RX ORDER — ONDANSETRON 2 MG/ML
4 INJECTION INTRAMUSCULAR; INTRAVENOUS ONCE
Status: COMPLETED | OUTPATIENT
Start: 2019-10-03 | End: 2019-10-03

## 2019-10-03 RX ORDER — CYCLOBENZAPRINE HCL 10 MG
10 TABLET ORAL 3 TIMES DAILY PRN
Qty: 12 TABLET | Refills: 0 | Status: SHIPPED | OUTPATIENT
Start: 2019-10-03 | End: 2020-03-18 | Stop reason: ALTCHOICE

## 2019-10-03 RX ADMIN — SODIUM CHLORIDE 500 ML: 0.9 INJECTION, SOLUTION INTRAVENOUS at 11:13

## 2019-10-03 RX ADMIN — FENTANYL CITRATE 25 MCG: 50 INJECTION INTRAMUSCULAR; INTRAVENOUS at 11:28

## 2019-10-03 RX ADMIN — ONDANSETRON 4 MG: 2 INJECTION INTRAMUSCULAR; INTRAVENOUS at 11:27

## 2019-10-03 RX ADMIN — IODIXANOL 100 ML: 320 INJECTION, SOLUTION INTRAVASCULAR at 12:18

## 2019-10-03 NOTE — ED PROVIDER NOTES
History  Chief Complaint   Patient presents with    Back Pain     lower back pain since tuesday, radiating bown BLE  denies numbness/weakness/incontinence  taking tylenol with no relief       History provided by:  Patient   used: No    Medical Problem - Major   Location:  Low back pain radiating to the abd and the legs for several days, nausea  No urinary or bladder problems  No CP or SOB  No fever  No trauma but works as a  and this started while at work  Severity:  Moderate  Onset quality:  Gradual  Duration:  3 days  Timing:  Constant  Progression:  Worsening  Chronicity:  New  Context:  No obvious trauma but may have been bending over at work as a   Some radiation to her legs  No numbness or weakness  No fever  Relieved by:  Nothing  Worsened by: Movement  Ineffective treatments:  None tried  Associated symptoms: abdominal pain and nausea    Associated symptoms: no chest pain, no congestion, no cough, no diarrhea, no fever, no headaches, no rash, no shortness of breath, no sore throat and no vomiting        Prior to Admission Medications   Prescriptions Last Dose Informant Patient Reported? Taking?    albuterol (PROVENTIL HFA,VENTOLIN HFA) 90 mcg/act inhaler   No No   Sig: Inhale 2 puffs every 6 (six) hours as needed for wheezing   aspirin (ECOTRIN LOW STRENGTH) 81 mg EC tablet   No No   Sig: Take 1 tablet (81 mg total) by mouth daily   atorvastatin (LIPITOR) 40 mg tablet   No No   Sig: Take 1 tablet (40 mg total) by mouth daily with dinner   gabapentin (NEURONTIN) 400 mg capsule   No No   Sig: Take 1 capsule (400 mg total) by mouth 3 (three) times a day   hydrALAZINE (APRESOLINE) 50 mg tablet   No No   Sig: Take 1 tablet (50 mg total) by mouth 3 (three) times a day for 90 days   levothyroxine 137 mcg tablet   No No   Sig: Take 1 tablet (137 mcg total) by mouth daily   nicotine (NICODERM CQ) 14 mg/24hr TD 24 hr patch   No No   Sig: Apply in morning and remove at bedtime for 4 weeks then start the 7 mg patch for 4 weeks   nicotine (NICODERM CQ) 7 mg/24hr TD 24 hr patch   No No   Sig: Begin after completion of 14 mg patch Apply in Am and remove at bedtime      Facility-Administered Medications: None       Past Medical History:   Diagnosis Date    Acute renal failure (ARF) (HCC)     Anemia     Asthma     Atopic dermatitis     Chronic kidney disease     Stage 3    Colon polyps     Diabetes mellitus ([facility] [address] )     type 2    Disease of thyroid gland     hypothyroidism    Diverticulosis     Edema of both legs     Gout     H/O colonoscopy     H/O mammogram     Hemorrhoids     Hyperlipidemia     Hypertension     Nephrosclerosis     Pain in unspecified foot     Peripheral neuropathy     Phlebitis     Venous stasis dermatitis of both lower extremities     Wrist joint pain     LEFT       Past Surgical History:   Procedure Laterality Date    CERVIX SURGERY      COLONOSCOPY N/A 5/31/2016    Procedure: COLONOSCOPY;  Surgeon: Wolfgang Aceves MD;  Location: [facility]; Service:     VEIN SURGERY         Family History   Problem Relation Age of Onset    Diabetes Mother         Type 2 as per allscripts    Heart attack Father     Diabetes type II Sister     Hypertension Other      I have reviewed and agree with the history as documented  Social History     Tobacco Use    Smoking status: Current Every Day Smoker     Packs/day: 0 50     Years: 15 00     Pack years: 7 50     Types: Cigarettes    Smokeless tobacco: Never Used    Tobacco comment: 30   Substance Use Topics    Alcohol use: Yes     Comment: social    Drug use: No        Review of Systems   Constitutional: Negative for chills and fever  HENT: Negative for congestion and sore throat  Respiratory: Negative for cough, chest tightness and shortness of breath  Cardiovascular: Negative for chest pain and leg swelling  Gastrointestinal: Positive for abdominal pain and nausea   Negative for diarrhea and vomiting  Genitourinary: Negative for difficulty urinating, dysuria and hematuria  Musculoskeletal: Positive for back pain  Negative for arthralgias, gait problem and joint swelling  Skin: Negative for rash  Neurological: Negative for weakness, numbness and headaches  All other systems reviewed and are negative  Physical Exam  Physical Exam   Constitutional: She appears well-developed and well-nourished  She is cooperative  Non-toxic appearance  She does not have a sickly appearance  She does not appear ill  No distress  HENT:   Head: Normocephalic and atraumatic  Right Ear: Hearing normal  No drainage or swelling  Left Ear: Hearing normal  No drainage or swelling  Mouth/Throat: Mucous membranes are normal    Eyes: Conjunctivae and lids are normal  Right eye exhibits no discharge  Left eye exhibits no discharge  Neck: Trachea normal and normal range of motion  No JVD present  Cardiovascular: Normal rate, regular rhythm, normal heart sounds, intact distal pulses and normal pulses  Exam reveals no gallop and no friction rub  No murmur heard  Pulmonary/Chest: Effort normal and breath sounds normal  No stridor  No respiratory distress  She has no wheezes  She has no rales  She exhibits no tenderness  Abdominal: Soft  Normal appearance  There is no tenderness  There is no rebound, no guarding and no CVA tenderness  Musculoskeletal: Normal range of motion  She exhibits no edema or deformity  Cervical back: Normal         Thoracic back: Normal         Lumbar back: She exhibits tenderness, bony tenderness and pain  She exhibits normal range of motion, no swelling, no edema and no spasm  Neg straight leg raise   Lymphadenopathy:     She has no cervical adenopathy  Neurological: She is alert  She has normal strength and normal reflexes  She displays normal reflexes  No cranial nerve deficit or sensory deficit  She exhibits normal muscle tone  Gait normal  GCS eye subscore is 4  GCS verbal subscore is 5  GCS motor subscore is 6  Reflex Scores:       Patellar reflexes are 2+ on the right side and 2+ on the left side  Achilles reflexes are 2+ on the right side and 2+ on the left side  Skin: Skin is warm, dry and intact  No rash noted  She is not diaphoretic  No pallor  Psychiatric: She has a normal mood and affect  Her speech is normal  Cognition and memory are normal    Nursing note and vitals reviewed        Vital Signs  ED Triage Vitals   Temperature Pulse Respirations Blood Pressure SpO2   10/03/19 1022 10/03/19 1017 10/03/19 1017 10/03/19 1017 10/03/19 1017   97 5 °F (36 4 °C) 73 18 (!) 216/107 98 %      Temp Source Heart Rate Source Patient Position - Orthostatic VS BP Location FiO2 (%)   10/03/19 1017 10/03/19 1017 10/03/19 1402 10/03/19 1017 --   Temporal Monitor Sitting Right arm       Pain Score       10/03/19 1017       Worst Possible Pain           Vitals:    10/03/19 1053 10/03/19 1232 10/03/19 1321 10/03/19 1402   BP: 164/84 (!) 179/76  (!) 179/110   Pulse:  71 68 78   Patient Position - Orthostatic VS:    Sitting         Visual Acuity      ED Medications  Medications   ondansetron (ZOFRAN) injection 4 mg (4 mg Intravenous Given 10/3/19 1127)   fentanyl citrate (PF) 100 MCG/2ML 25 mcg (25 mcg Intravenous Given 10/3/19 1128)   sodium chloride 0 9 % bolus 500 mL (0 mL Intravenous Stopped 10/3/19 1152)   iodixanol (VISIPAQUE) 320 MG/ML injection 100 mL (100 mL Intravenous Given 10/3/19 1218)       Diagnostic Studies  Results Reviewed     Procedure Component Value Units Date/Time    Basic metabolic panel [[id number]]  (Abnormal) Collected:  10/03/19 1109    Lab Status:  Final result Specimen:  Blood from Arm, Left Updated:  10/03/19 1136     Sodium 139 mmol/L      Potassium 3 5 mmol/L      Chloride 104 mmol/L      CO2 27 mmol/L      ANION GAP 8 mmol/L      BUN 28 mg/dL      Creatinine 1 16 mg/dL      Glucose 118 mg/dL      Calcium 9 0 mg/dL      eGFR 51 ml/min/1 73sq m Narrative:       National Kidney Disease Foundation guidelines for Chronic Kidney Disease (CKD):     Stage 1 with normal or high GFR (GFR > 90 mL/min/1 73 square meters)    Stage 2 Mild CKD (GFR = 60-89 mL/min/1 73 square meters)    Stage 3A Moderate CKD (GFR = 45-59 mL/min/1 73 square meters)    Stage 3B Moderate CKD (GFR = 30-44 mL/min/1 73 square meters)    Stage 4 Severe CKD (GFR = 15-29 mL/min/1 73 square meters)    Stage 5 End Stage CKD (GFR <15 mL/min/1 73 square meters)  Note: GFR calculation is accurate only with a steady state creatinine    Protime-INR [[id number]]  (Normal) Collected:  10/03/19 1109    Lab Status:  Final result Specimen:  Blood from Arm, Left Updated:  10/03/19 1136     Protime 13 2 seconds      INR 0 99    APTT [[id number]]  (Normal) Collected:  10/03/19 1109    Lab Status:  Final result Specimen:  Blood from Arm, Left Updated:  10/03/19 1136     PTT 28 seconds     Hepatic function panel [[id number]]  (Abnormal) Collected:  10/03/19 1109    Lab Status:  Final result Specimen:  Blood from Arm, Left Updated:  10/03/19 1136     Total Bilirubin 0 41 mg/dL      Bilirubin, Direct 0 09 mg/dL      Alkaline Phosphatase 134 U/L      AST 13 U/L      ALT 14 U/L      Total Protein 8 2 g/dL      Albumin 4 2 g/dL     Lipase [[id number]]  (Normal) Collected:  10/03/19 1109    Lab Status:  Final result Specimen:  Blood from Arm, Left Updated:  10/03/19 1136     Lipase 269 u/L     Magnesium [[id number]]  (Normal) Collected:  10/03/19 1109    Lab Status:  Final result Specimen:  Blood from Arm, Left Updated:  10/03/19 1136     Magnesium 1 8 mg/dL     Urine Microscopic [[id number]]  (Abnormal) Collected:  10/03/19 1101    Lab Status:  Final result Specimen:  Urine, Clean Catch Updated:  10/03/19 1136     RBC, UA 0-1 /hpf      WBC, UA 1-2 /hpf      Epithelial Cells Occasional /hpf      Bacteria, UA Occasional /hpf      Hyaline Casts, UA 2-4 /lpf      MUCUS THREADS Occasional    CBC and differential [[id number]]  (Abnormal) Collected:  10/03/19 1109    Lab Status:  Final result Specimen:  Blood from Arm, Left Updated:  10/03/19 1120     WBC 10 27 Thousand/uL      RBC 3 98 Million/uL      Hemoglobin 12 1 g/dL      Hematocrit 38 9 %      MCV 98 fL      MCH 30 4 pg      MCHC 31 1 g/dL      RDW 13 7 %      MPV 11 3 fL      Platelets 703 Thousands/uL      nRBC 0 /100 WBCs      Neutrophils Relative 78 %      Immat GRANS % 1 %      Lymphocytes Relative 13 %      Monocytes Relative 7 %      Eosinophils Relative 1 %      Basophils Relative 0 %      Neutrophils Absolute 8 06 Thousands/µL      Immature Grans Absolute 0 06 Thousand/uL      Lymphocytes Absolute 1 29 Thousands/µL      Monocytes Absolute 0 76 Thousand/µL      Eosinophils Absolute 0 07 Thousand/µL      Basophils Absolute 0 03 Thousands/µL     POCT urinalysis dipstick [[id number]]  (Abnormal) Resulted:  10/03/19 1104    Lab Status:  Final result Specimen:  Urine Updated:  10/03/19 1104    ED Urine Macroscopic [[id number]]  (Abnormal) Collected:  10/03/19 1101    Lab Status:  Final result Specimen:  Urine Updated:  10/03/19 1102     Color, UA Yellow     Clarity, UA Clear     pH, UA 5 0     Leukocytes, UA Negative     Nitrite, UA Negative     Protein,  (2+) mg/dl      Glucose, UA Negative mg/dl      Ketones, UA Negative mg/dl      Urobilinogen, UA 0 2 E U /dl      Bilirubin, UA Interference- unable to analyze     Blood, UA Negative     Specific Gravity, UA 1 020    Narrative:       CLINITEK RESULT                 CT abdomen pelvis with contrast   Final Result by Jesusita Vernon MD (10/03 1305)      1  No acute inflammatory process in the abdomen or pelvis  2   Stable common bile duct dilatation without obstructing lesion identified  This may be secondary to chronic stricture  3   12 mm left ovarian cyst, not present previously  Follow-up ultrasound is recommended for further evaluation in this late postmenopausal patient  4   Hiatal hernia  Workstation performed: [id number]         CT recon only lumbar spine (No Charge)   Final Result by Ramez Byrnes DO (10/03 1315)      Lumbar degenerative disc disease and facet degenerative change at the L4-5 and L5-S1 levels  Only mild canal stenosis and foraminal narrowing is noted  Workstation performed: [id number]                    Procedures  Procedures       ED Course                               MDM  Number of Diagnoses or Management Options  Abnormal CT of the abdomen:   Common bile duct dilatation:   Hiatal hernia:   Low back pain:   Ovarian cyst:   Diagnosis management comments: I discussed all of the patient's CT scan results with her including the ovarian cyst, hiatal hernia, biliary ductal dilatation and back findings and the need for further workup, imaging  She is feeling a bit better at discharge but still getting some twinges of pain  Will refer to the spinal Carlos and the family doctor  Neurologically she is intact with normal reflexes not having any bowel or bladder complaints         Amount and/or Complexity of Data Reviewed  Clinical lab tests: ordered and reviewed  Tests in the radiology section of CPT®: reviewed and ordered    Patient Progress  Patient progress: stable      Disposition  Final diagnoses:   Low back pain   Abnormal CT of the abdomen   Ovarian cyst   Hiatal hernia   Common bile duct dilatation     Time reflects when diagnosis was documented in both MDM as applicable and the Disposition within this note     Time User Action Codes Description Comment    10/3/2019  1:29 PM Delynn Sanam Add [M54 5] Low back pain     10/3/2019  1:30 PM Delynn Sanam J Add [R93 5] Abnormal CT of the abdomen     10/3/2019  1:30 PM Delynn Sanam Add [R41 459] Ovarian cyst     10/3/2019  1:30 PM Delynn Sanam J Add [K44 9] Hiatal hernia     10/3/2019  1:30 PM Delynn Sanam Add [K83 8] Common bile duct dilatation       ED Disposition ED Disposition Condition Date/Time Comment    Discharge Stable u Oct 3, 2019  1:29 PM Albert Leslie discharge to home/self care  Follow-up Information     Follow up With Specialties Details Why Contact Jazmyne Cuevas DO Family Medicine Schedule an appointment as soon as possible for a visit in 1 week You have abnormal CT scan results that need follow-up and further testing   [address]            Discharge Medication List as of 10/3/2019  1:33 PM      START taking these medications    Details   cyclobenzaprine (FLEXERIL) 10 mg tablet Take 1 tablet (10 mg total) by mouth 3 (three) times a day as needed for muscle spasms for up to 10 days, Starting Thu 10/3/2019, Until Sun 10/13/2019, Print         CONTINUE these medications which have NOT CHANGED    Details   albuterol (PROVENTIL HFA,VENTOLIN HFA) 90 mcg/act inhaler Inhale 2 puffs every 6 (six) hours as needed for wheezing, Starting Tue 9/11/2018, Normal      aspirin (ECOTRIN LOW STRENGTH) 81 mg EC tablet Take 1 tablet (81 mg total) by mouth daily, Starting Sun 8/25/2019, No Print      atorvastatin (LIPITOR) 40 mg tablet Take 1 tablet (40 mg total) by mouth daily with dinner, Starting Thu 8/29/2019, Normal      gabapentin (NEURONTIN) 400 mg capsule Take 1 capsule (400 mg total) by mouth 3 (three) times a day, Starting Thu 8/29/2019, Normal      hydrALAZINE (APRESOLINE) 50 mg tablet Take 1 tablet (50 mg total) by mouth 3 (three) times a day for 90 days, Starting Wed 6/26/2019, Until Tue 9/24/2019, Normal      levothyroxine 137 mcg tablet Take 1 tablet (137 mcg total) by mouth daily, Starting Wed 7/3/2019, Normal      nicotine (NICODERM CQ) 14 mg/24hr TD 24 hr patch Apply in morning and remove at bedtime for 4 weeks then start the 7 mg patch for 4 weeks, Normal      nicotine (NICODERM CQ) 7 mg/24hr TD 24 hr patch Begin after completion of 14 mg patch Apply in Am and remove at bedtime, Normal               ED Provider  Electronically Signed by           Corrine Brenner MD  10/03/19 0508

## 2019-10-03 NOTE — ED NOTES
Patient sleeping and snoring upon reevaluation  Patient waiting for CT results        Isabell Bernardo RN  10/03/19 5058

## 2019-10-04 ENCOUNTER — TELEPHONE (OUTPATIENT)
Dept: PHYSICAL THERAPY | Facility: OTHER | Age: 64
End: 2019-10-04

## 2019-10-08 ENCOUNTER — TELEPHONE (OUTPATIENT)
Dept: FAMILY MEDICINE CLINIC | Facility: CLINIC | Age: 64
End: 2019-10-08

## 2019-10-08 DIAGNOSIS — I63.9 CEREBROVASCULAR ACCIDENT (CVA), UNSPECIFIED MECHANISM (HCC): Primary | ICD-10-CM

## 2019-11-06 ENCOUNTER — TELEPHONE (OUTPATIENT)
Dept: NEUROLOGY | Facility: CLINIC | Age: 64
End: 2019-11-06

## 2019-11-08 NOTE — TELEPHONE ENCOUNTER
Patient calling back to reschedule, I asked to put the patient on hold and she stated she couldn't wait on hold as she was at work  She is requesting we call her back to reschedule her, she stated she is more available on Wednesdays  Okay to leave a detailed message

## 2019-11-13 NOTE — UTILIZATION REVIEW
URGENT/EMERGENT  INPATIENT/SPU AUTHORIZATION REQUEST    Date: 11/13/19            # Pages in this Request:     x New Request   Additional Information for PA#:     Office Contact Name:  Gerardo Condon Title: Utilization Review, Jaz Nurse     Phone: [phone]  Ext  Availability (Date/Time): Wednesday - Friday 8 am- 4 pm    x Inpatient Review  SPU Review        Current       x Late Pick-up   · How your facility was first notified of the Late Pick-up: PATHS  · When your facility was first notified of the Late Pick-up (date): 11/11         RECIPIENT INFORMATION    Recipient XI#:[phone]  Recipient Jocelyne [facility]    YOB: 1955  60 y o  Recipient Alias:     Gender:   Male X Female Medicaid Eligibility ([address] [place] [address]): INSURANCE INFORMATION    (All other private or governmental health insurance benefits must be utilized prior to billing the [place] Program)    Was this admission the result of an MVA, other accident, assault, injury, fall, gunshot, bite etc ? Yes X No                   If yes, provide a brief description of the incident  Does the recipient have other insurance coverage? Yes X No        Insurance Company Name/Policy #      Did that insurance pay on this claim? Yes  No        Did that insurance deny this claim? Yes  No    If yes, reason for denial:      Does the recipient have Medicare? Yes X No        Did Medicare exhaust prior to this admission? Yes  No            Did Medicare partially pay this claim? Yes  No        Did that insurance deny this claim? Yes  No    If yes, reason for denial:          Was the recipient a prisoner at the time of admission?    Yes X No            PROVIDER INFORMATION    Hospital Name: [facility] ([address] Provider ID#: [id number]    [address] [place] [address] Physician Name: [facility])  [employer] Provider ID#: [id number]        ADMISSION INFORMATION    Type of Admission: (please choose one)    X ED      Direct    If yes, from where? Transfer    If yes, transferring hospital (inpatient, rehab, or psych) Provider Name/Provider ID#: Admission Floor or Unit Type: MED-SURG    Dates/Times:        ED Date/Time: 8/22/2019  07:23        Observation Date/Time:         Admission Date/Time: 8/22/19 1022        Discharge or Transfer Date/Time: 8/24/2019  1320        DIAGNOSIS/PROCEDURE CODES    Primary Diagnosis Code/Primary Diagnosis Code description:   TIA (transient ischemic attack) [G45 9]  Acute renal insufficiency [N28 9]  Chest pain, rule out acute myocardial infarction [R07 9]  CEREBRAL INFARCTION I63 9  (MAY RE-ORDER DX CODES)  Additional Diagnosis Code(s) and Description(s)-(up to three additional codes):     Procedure Code (one) and description: NONE        CLINICAL INFORMATION - PRIOR ADMISSION ONLY    Is there a prior admission with a discharge date within 30 days of the date of this admission? X No (Proceed to the next section - "Clinical Information - General Review Checklist:)      Yes (Provide the following information)     Prior admission dates:    MA Prior Authorization Number:        Review Outcome:     Diagnosis Code(s)/Description:    Procedure Code/Description:    Findings:    Treatment:    Condition on Discharge:   Vitals:    Labs:   Imaging:   Medications: Follow-up Instructions:    Disposition:        CLINICAL INFORMATION - GENERAL REVIEW CHECKLIST    EMERGENCY DEPARTMENT: (Proceed to "ADMISSION" if Direct Admission)    Presenting Signs/Symptoms:    Chest Pain       pt c/o chest pain since she woke up this morning at 0500  describes the pain as a tightness and pressure  also c/o bilateral hand numbness since monday  denies cardiac hx        Assessment/Plan: 66year old female to the ED from home with complaints of chest pain which started 3 hours prior to her arrivalin addition to speech difficulty and left hand weakness for 3 days  Admitted to inpatient for TIA vs CVA    SHe has GCS=15, nonfocal neuro exam   NIHSS in the ED is 0  Not a tpa candidate as symptoms have resolved  Per Neuro consult: Together with her reported prior problems with dysarthria, this is fairly suggestive small vessel infarction, either right thalamus, left cerebellar or brainstem  Check MRI, routine neuro checks, ECHO, tele  She reports that she is still experiencing LUE and LLE weakness and a headache  Expect greater than 2 midnight stay      Medication/treatment prior to arrival in the ED:     Past Medical History:    Active Ambulatory Problems     Diagnosis Date Noted    Chronic kidney disease, stage 3 ([facility] ) 08/26/2015    Mixed hyperlipidemia 07/09/2012    Essential hypertension 09/25/2012    Chronic gout 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 tophus 08/26/2015    Acquired hypothyroidism 07/09/2012    Nummular eczema 06/22/2016    Overweight (BMI 25 0-29 9) 02/01/2017    Type 2 diabetes mellitus with stage 3 chronic kidney disease, without long-term current use of insulin ([facility] ) 02/06/2018    Screening for breast cancer 06/26/2019    Right-sided lacunar infarction ([facility] ) 08/29/2019       Past Medical History:   Diagnosis Date    Acute renal failure (ARF) (HCC)     Anemia     Asthma     Atopic dermatitis     Chronic kidney disease     Colon polyps     Diabetes mellitus ([facility] )     Disease of thyroid gland     Diverticulosis     Edema of both legs     Gout     H/O colonoscopy     H/O mammogram     Hemorrhoids     Hyperlipidemia     Hypertension     Nephrosclerosis     Pain in unspecified foot     Peripheral neuropathy     Phlebitis     Venous stasis dermatitis of both lower extremities     Wrist joint pain        Clinical Exam:     Initial Vital Signs: (Temp, Pulse, Resp, and BP)   ED Triage Vitals   Temperature Pulse Respirations Blood Pressure SpO2   08/22/19 0733 08/22/19 0733 08/22/19 0733 08/22/19 0733 08/22/19 0733   98 5 °F (36 9 °C) 58 16 (!) 173/79 97 %      Temp Source Heart Rate Source Patient Position - Orthostatic VS BP Location FiO2 (%)   08/22/19 0733 08/22/19 0843 08/22/19 0843 08/22/19 0843 --   Oral Monitor Lying Right arm       Pain Score       08/22/19 0731       8           Pertinent Repeat Vital Signs: (include times they were obtained)  08/23/19 0700   97 7 °F (36 5 °C)   58   18   148/66   98 %   None (Room air)   Lying   08/23/19 0300   97 8 °F (36 6 °C)   57   16   145/79   97 %   None (Room air)   Lying   08/22/19 1845   97 7 °F (36 5 °C)   76   18   138/61   96 %   None (Room air)   Lying   08/22/19 1645   98 4 °F (36 9 °C)   60   18   175/86Abnormal    96 %   None (Room air)   Lying   08/22/19 1546   97 8 °F (36 6 °C)   67   19   175/93Abnormal    98 %   None (Room air)   Sitting   08/22/19 1325      75   16   162/110Abnormal    97 %   None (Room air)   Lying   08/22/19 1322      73   14   158/70   98 %      Lying   08/22/19 1234      63   14   158/70   100 %   None (Room air)   Lying   08/22/19 1056      64   16   168/78                     Pertinent Sustained Findings: (include times they were obtained)    Weight in Kilograms:  08/22/19 88 9 kg (195 lb 15 8 oz)         Pertinent Labs (results):  Lab Units 08/22/19  1634 08/22/19  0738   WBC Thousand/uL  --  8 43   HEMOGLOBIN g/dL  --  12 3   HEMATOCRIT %  --  39 1   PLATELETS Thousands/uL 210 232   NEUTROS ABS Thousands/µL  --  5 84                Results from last 7 days   Lab Units 08/23/19  0524 08/22/19  0738   SODIUM mmol/L 145 143   POTASSIUM mmol/L 3 5 3 6   CHLORIDE mmol/L 110* 106   CO2 mmol/L 25 24   ANION GAP mmol/L 10 13   BUN mg/dL 38* 45*   CREATININE mg/dL 1 20 1 77*   EGFR ml/min/1 73sq m 49 31   CALCIUM mg/dL 8 8 8 7           Results from last 7 days   Lab Units 08/22/19  0738   AST U/L 19   ALT U/L 27   ALK PHOS U/L 117*   TOTAL PROTEIN g/dL 7 7   ALBUMIN g/dL 3 9   TOTAL BILIRUBIN mg/dL 0 40                Results from last 7 days   Lab Units 08/23/19  0524 08/22/19  0738   GLUCOSE RANDOM mg/dL 120 141*               Results from last 7 days   Lab Units 08/23/19  0524   HEMOGLOBIN A1C % 6 2   EAG mg/dl 131                Results from last 7 days   Lab Units 08/22/19  1634 08/22/19  1245 08/22/19  0738   TROPONIN I ng/mL <0 02 <0 02 <0 02       Radiology (results):  MRA brain: Limited visualization of the intradural segments of the vertebral arteries with possible stenosis of the left vertebral artery   Right vertebral artery may be hypoplastic or occluded  MRI brain 8/22: Early subacute lacunar infarct in the right ventral sachin   No mass effect or hemorrhage  Extensive white matter lesions, suggesting microangiopathic disease, advanced for age  CT head:No acute intracranial hemorrhage seen  No CT signs of acute territorial infarction   EKG:   Interpretation:     Interpretation: normal    Rate:     ECG rate:  63    ECG rate assessment: normal    Rhythm:     Rhythm: sinus rhythm    Ectopy:     Ectopy: PAC    QRS:     QRS axis:  Normal    QRS intervals:  Normal  Conduction:     Conduction: normal    ST segments:     ST segments:  Normal  T waves:     T waves: normal           Other tests (results):  Row Name     08/23/19  0800     08/23/19  0445   Neurological Assessment    Level of Consciousness     Alert/awake     Alert/awake   Orientation Level     Oriented X4     Oriented X4   Cognition     Appropriate for  developmental  age     Appropriate for  developmental  age   Extraocular Movements     Full     Full   Right Upper Visual Fields     Intact     Intact   Left Upper Visual Fields     Intact     Intact   Right Lower Visual Fields     Intact     Intact   Left Lower Visual Fields     Intact     Intact   Speech     Clear     Clear   Facial Symmetry     Symmetrical     Symmetrical   Tongue Deviation     Midline     Midline   R Pupil Size (mm)     3     3   R Pupil Reaction     Brisk     Brisk   L Pupil Size (mm)     3     3   L Pupil Reaction     Brisk     Brisk   R Hand      Strong     Strong   L Hand      Strong     Strong   RUE Muscle Strength     5- Normal stre-  ngth     5- Normal stre-  ngth   LUE Muscle Strength     5- Normal stre-  ngth     5- Normal stre-  ngth   RLE Muscle Strength     5- Normal stre-  ngth     5- Normal stre-  ngth   LLE Muscle Strength     5- Normal stre-  ngth     5- Normal stre-  ngth   RUE Sensation     Full sensation     Full sensation   LUE Sensation     Full sensation     Full sensation   RLE Sensation     Full sensation     Full sensation   LLE Sensation     Full sensation     Full sensation   Zayda Coma Scale    Eye Opening     4     4   Best Verbal Response     5     5   Best Motor Response     6     6   [place] Coma Scale Score     15              Tests pending final results:    Treatment in the ED:   Medication Administration from 08/22/2019 0723 to 08/22/2019 1535       Date/Time Order Dose Route Action                  08/22/2019 0901 sodium chloride 0 9 % bolus 1,000 mL 1,000 mL Intravenous New Bag       08/22/2019 1456 hydrALAZINE (APRESOLINE) tablet 50 mg 50 mg Oral Given       08/22/2019 1458 nicotine (NICODERM CQ) 21 mg/24 hr TD 24 hr patch 1 patch 1 patch Transdermal Medication Applied       08/22/2019 1501 heparin (porcine) subcutaneous injection 5,000 Units 5,000 Units Subcutaneous Given       08/22/2019 1259 aspirin tablet 325 mg 325 mg Oral Given             Meds: Name, dose, route, time, number of doses given        Nebulizer treatments: Type, total number given      IVs: Type, rate, total amt  given          Other treatments:       Change in condition while in the ED:     Response to ED Treatment:           OBSERVATION: (Proceed to "ADMISSION" if Direct Admission)    Orders written during the observation period  Meds Name, dose, route, time, how may doses given:  PRN Meds Name, dose, route, time, how many doses given within the first 24 hrs :  IVs Type, rate, and total amt   ordered/given:  Labs, imaging, other:  Consults and findings:    Test Results during the observation period  Pertinent Lab tests (dates/results):  Culture results (blood, urine, spinal, wound, respiratory, etc ):  Imaging tests (dates/results):  EKG (dates/results): Other test (dates/results):  Tests pending (dates/results):    Surgical or Invasive Procedures during the observation period  Name of surgery/procedure:  Date & Time:  Patient Response:  Post-operative orders:  Operative Report/Findings:    Response to Treatment, Major Change in Condition, Major Charge in Treatment during the observation period          ADMISSION:    DIRECT Admissions Only:    · Presenting Signs/Symptoms:   ·   · Medication/treatment prior to arrival:  ·   · Past Medical History:  ·   · Clinical Exam on admission:  ·   · Vital Signs on admission: (Temp, Pulse, Resp, and BP)  ·   · Weight in kilograms:     ALL Admissions:    Admission Orders and Other Orders written within the first 24 hrs after admission  Neuro checks every 4 hours  Tele  SCDS  Troponin every 3 hours  ECHO  IP CONSULT TO NEUROLOGY    Meds Name, dose, route, time, how may doses given:  aspirin 81 mg Oral Daily   atorvastatin 40 mg Oral Daily With Dinner   cloNIDine 0 1 mg Oral Q8H PRN   gabapentin 400 mg Oral TID   heparin (porcine) 5,000 Units Subcutaneous Q8H [address]   hydrALAZINE 50 mg Oral Q8H [address]   levothyroxine 137 mcg Oral Early Morning   nicotine 1 patch Transdermal Daily           PRN Meds Name, dose, route, time, how many doses given within the first 24 hrs :  IVs Type, rate, and total amt  ordered/given:  Labs, imaging, other:      Consults and findings:  Weakness/slurred speech/imbalance will admit with neuro checks to rule out TIA versus CVA associated, may be component of anxiety    Will request an MRI of the brain and consult Neurology initiate aspirin     Chest pain                               ruled out acute coronary syndrome, continue tele and serial troponin, initiated aspirin     CKD 3 creatinine elevated on admission 1 77, hold diuretics and monitor with gentle fluids given in the ED  Baseline creatinine 1 2 to 1 3      Hypertension                           elevated on admission continue hydralazine with low-salt diet and monitor     Diabetes                                  remote history, patient reports she is now nondiabetic per her PCP, will check a m  Glucose and hemoglobin A1c     Dyslipidemia                            will request a lipid profile                          Tobacco abuse                       cessation counseling provided, initiate nicotine patch     Expect greater than 2 midnight stay     Consultation - Neurology   Robert Grad 66 y o  female MRN: [id number]  Unit/Bed#: ED 14 Encounter: [id number]           Assessment/Plan      Assessment:  3 66year-old with a history of multiple vascular risk factors who presented with dysarthria, left sided weakness, imbalance, and bilateral hand numbness since Monday  Concern for an acute infarction, will obtain MRI brain and admit to stroke pathway  Plan:  - Continue Aspirin 325mg  - MRI brain without contrast pending   - Echocardiogram  - Lipid panel pending  - HgbA1c pending  - Telemetry  - Secondary risk factor modification  - Smoking cessation advised   - PT/OT/ST  - Stroke Education  - Frequent neurological checks  - Stat CT head with acute worsening in neuro exam/mental status  - Notify Neurology with any changes in examination         Test Results after admission  Pertinent Lab tests (dates/results):  Culture results (blood, urine, spinal, wound, respiratory, etc ):  Imaging tests (dates/results):  EKG (dates/results):   Other test (dates/results):  Tests pending (dates/results):    Surgical or Invasive Procedures  Name of surgery/procedure:  Date & Time:  Patient Response:  Post-operative orders:  Operative Report/Findings:    Response to Treatment, Major Change in Condition, Major Charge in Treatment anytime during admission    Disposition on Discharge  Home, Rehab, SNF, LTC, Shelter, etc : Home/Self Care    Cease to Breathe (CTB)  If a patient expires during an admission, in addition to the above information, please include:    Summary/timeline of the patient's decline in condition:    Medications and treatment:    Patient response to treatment:    Date and time patient ceased to breathe:        Is there a Readmission that follows this admission? Yes X No    If yes, reason for denial:          InterQual Review    InterQual Criteria Met:  Yes X No  N/A        Please include the InterQual Review, InterQual year/version used, and the criteria selected:   Patient: Parveen Miller  Review Number: [id number]  Review Status:  In Primary  Criteria Status: Not Met     Condition Specific: Yes        OUTCOMES  Outcome Type: Primary           REVIEW DETAILS     Product: [place] Adult  Subset: Stroke      (Symptom or finding within 24h)         (Excludes PO medications unless noted)          Select Day, One:              [ ] Episode Day 1, One:                  [ ] ACUTE, All:                      [X] Thrombolysis contraindicated                      [X] Neurological deficit, >= One:                          [X] Dysarthria                          [X] Paresis                      [ ] Finding by computed tomography (CT) or magnetic resonance imaging (MRI) and, >= One:                          [X] Focal ischemia, actual or suspected                      [X] Intervention, All:                          [X] Aspiration risk assessment and, One:                              [X] No risk and diet as tolerated                          [X] Continuous cardiac monitoring (excludes Holter)                          [X] Deep vein thrombosis (DVT) prophylaxis or patient ambulatory                          [X] Neurological assessment at least 6x/24h     Version: [employer] 2019 1  Jr Duffy  © 2019 [address] [employer] and/or one of its [place]  All Rights Reserved  CPT only © 2018 [employer]  All Rights Reserved  PLEASE SUBMIT THE COMPLETED FORM TO THE DEPARTMENT OF HUMAN SERVICES - DIVISION OF CLINICAL  REVIEW VIA FAX -[phone] or VIA E-MAIL TO [email or web address]    Signature: Connie Cabrera Date:  11/13/19    Confidentiality Notice: The documents accompanying this telecopy may contain confidential information belonging to the sender  The information is intended only for the use of the individual named above  If you are not the intended recipient, you are hereby notified  That any disclosure, copying, distribution or taking of any telecopy is strictly prohibited

## 2019-11-26 DIAGNOSIS — E03.9 ACQUIRED HYPOTHYROIDISM: ICD-10-CM

## 2019-11-26 DIAGNOSIS — J45.40 MODERATE PERSISTENT ASTHMA WITHOUT COMPLICATION: ICD-10-CM

## 2019-11-26 RX ORDER — LEVOTHYROXINE SODIUM 137 UG/1
TABLET ORAL
Qty: 90 TABLET | Refills: 0 | Status: SHIPPED | OUTPATIENT
Start: 2019-11-26 | End: 2020-01-07 | Stop reason: SDUPTHER

## 2019-11-26 RX ORDER — ALBUTEROL SULFATE 90 UG/1
AEROSOL, METERED RESPIRATORY (INHALATION)
Qty: 18 EACH | Refills: 5 | Status: SHIPPED | OUTPATIENT
Start: 2019-11-26 | End: 2020-01-07 | Stop reason: SDUPTHER

## 2019-12-04 ENCOUNTER — OFFICE VISIT (OUTPATIENT)
Dept: NEUROLOGY | Facility: CLINIC | Age: 64
End: 2019-12-04
Payer: COMMERCIAL

## 2019-12-04 VITALS
HEIGHT: 69 IN | RESPIRATION RATE: 16 BRPM | DIASTOLIC BLOOD PRESSURE: 80 MMHG | BODY MASS INDEX: 29.33 KG/M2 | HEART RATE: 80 BPM | WEIGHT: 198 LBS | SYSTOLIC BLOOD PRESSURE: 160 MMHG

## 2019-12-04 DIAGNOSIS — Z86.73 HISTORY OF CVA (CEREBROVASCULAR ACCIDENT): Primary | ICD-10-CM

## 2019-12-04 DIAGNOSIS — I10 ESSENTIAL HYPERTENSION: ICD-10-CM

## 2019-12-04 DIAGNOSIS — Z72.0 TOBACCO ABUSE: Chronic | ICD-10-CM

## 2019-12-04 PROCEDURE — 99214 OFFICE O/P EST MOD 30 MIN: CPT | Performed by: PHYSICIAN ASSISTANT

## 2019-12-04 NOTE — PROGRESS NOTES
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 as well  She is taking ASA 81mg and Lipitor 40mg  She continues to smoke, says she cut down, but has not quit completely  She is not checking her BP at home and it is not at goal today  She does not have a cuff at home, so this will be prescribed today  She has not had any new symptoms to indicate recurrent TIA/CVA  No residual symptoms from her CVA in August     Plan:  -continue aspirin 81 mg and Lipitor 40 mg daily for secondary stroke prevention   -smoking cessation strongly advised  We discussed this in detail today  -continue to follow closely with PCP for management of cardiovascular risk factors including blood pressure, lipids, blood sugar   -suggest checking blood pressure regularly at home with a goal BP less than 130/80  Script given for a blood pressure cuff today   -follow-up with vascular Neurology attending in 4 months or sooner if needed  Signs and symptoms of stroke were discussed with the patient today  Diagnoses and all orders for this visit:    History of CVA (cerebrovascular accident)    Tobacco abuse    Essential hypertension           Subjective:    HPI    Leonel Rainey is a 60 y o  female with PMH of tobacco use, HTN, HLD, DM, CKD, asthma, hypothyroidism, who presents today for a hospital follow up  Patient presented to [address] [place] [address] ED on 8/22/19 with the complaint of chest pain upon waking up that morning  Patient reported that she had been experiencing intermittent chest pain for the past few days, but the pain worsened on 8/22, prompting the ED visit  /79 in the ED    Neurology was consulted because patient had also been experiencing intermittent dysarthria, imbalance, left sided weakness, and bilateral hand numbness, as well as headaches  She denied any history of migraine or stroke  CTH negative for acute infarction or hemorrhage  MRI brain demonstrated early subacute infarction in the right ventral sachin  Extensive chronic microangiopathic changes  On MRA head, there was question of possible stenosis of the left vertebral artery, as well as either hypoplastic or occluded right vertebral artery  CTA neck demonstrated hypoplastic right vertebral artery with nonenhancing portions within the V2 segment  The vessel reconstitutes distally into hypoplastic V3 and V4 segments  Normal left vertebral artery  Normal anterior cervical circulation  A1C 6 2, Lipid panel ,   ECHO was not completed while in the hospital   She was started on ASA 81mg and Lipitor 40mg daily for secondary stroke prevention  Today, patient reports she is is doing well  She denies any new neurologic symptoms  She is taking aspirin and statin as advised  She has not been checking her blood pressure at home  She continues to smoke, however she reports she has cut down  She is smoking approximately 5-6 cigarettes a day  She says it is hard to quit, did not like the patches  The following portions of the patient's history were reviewed and updated as appropriate: current medications, past family history, past medical history, past social history, past surgical history and problem list          Objective:    Blood pressure 160/80, pulse 80, resp  rate 16, height 5' 9" (1 753 m), weight 89 8 kg (198 lb)  Physical Exam   Constitutional: She appears well-developed and well-nourished  HENT:   Head: Normocephalic and atraumatic  Eyes: Pupils are equal, round, and reactive to light  EOM are normal    Cardiovascular: Intact distal pulses  Neurological: She has normal strength and normal reflexes  Coordination normal    Skin: Skin is warm and dry  Psychiatric: She has a normal mood and affect  Her speech is normal        Neurological Exam  Mental Status   Oriented to person, place, time and situation  Recent and remote memory are intact  Speech is normal  Language is fluent with no aphasia  Attention and concentration are normal     Cranial Nerves  CN II: Visual fields full to confrontation  CN III, IV, VI: Extraocular movements intact bilaterally  Pupils equal round and reactive to light bilaterally  CN V: Facial sensation is normal   CN VII: Full and symmetric facial movement  CN VIII: Hearing is normal   CN IX, X: Palate elevates symmetrically  CN XI: Shoulder shrug strength is normal   CN XII: Tongue midline without atrophy or fasciculations  Motor   Normal muscle tone  Strength is 5/5 throughout all four extremities  Sensory  Light touch is normal in upper and lower extremities  Reflexes  Deep tendon reflexes are 2+ and symmetric in all four extremities with downgoing toes bilaterally  Coordination  Finger-to-nose, rapid alternating movements and heel-to-shin normal bilaterally without dysmetria  Gait  Casual gait is normal including stance, stride, and arm swing  ROS:    Review of Systems   Constitutional: Negative  Negative for appetite change and fever  HENT: Negative  Negative for hearing loss, tinnitus, trouble swallowing and voice change  Eyes: Negative  Negative for photophobia and pain  Respiratory: Negative  Negative for shortness of breath  Cardiovascular: Negative  Negative for palpitations  Gastrointestinal: Positive for nausea  Negative for vomiting  Endocrine: Negative  Negative for cold intolerance and heat intolerance  Genitourinary: Negative  Negative for dysuria, frequency and urgency  Musculoskeletal: Positive for arthralgias, back pain, myalgias and neck pain  Skin: Negative  Negative for rash  Neurological: Negative    Negative for dizziness, tremors, seizures, syncope, facial asymmetry, speech difficulty, weakness, light-headedness, numbness and headaches  Hematological: Negative  Does not bruise/bleed easily  Psychiatric/Behavioral: Negative  Negative for confusion, hallucinations and sleep disturbance       I personally reviewed and updated the ROS as appropriate

## 2019-12-04 NOTE — PATIENT INSTRUCTIONS
Continue aspirin 81mg and lipitor 40mg for secondary stroke prevention  I would strongly advise that you quit smoking, as this is a major risk factor for stroke  It is also very important that you follow up closely with your PCP in order to maintain good control of cardiovascular risk factors including blood pressure, cholesterol, blood sugar  I would advise that you check your blood pressure regularly at home  Goal BP would be less than 130/80  Follow up in 4 months or sooner if needed  If you experience any facial droop, weakness on one side of the body, speech or swallowing difficulty, painless loss of vision in one eye, double vision, vertigo that does not resolve quickly/imbalance, go to the ER/call 911

## 2020-01-07 ENCOUNTER — OFFICE VISIT (OUTPATIENT)
Dept: FAMILY MEDICINE CLINIC | Facility: CLINIC | Age: 65
End: 2020-01-07
Payer: COMMERCIAL

## 2020-01-07 ENCOUNTER — TELEPHONE (OUTPATIENT)
Dept: FAMILY MEDICINE CLINIC | Facility: CLINIC | Age: 65
End: 2020-01-07

## 2020-01-07 VITALS
SYSTOLIC BLOOD PRESSURE: 168 MMHG | BODY MASS INDEX: 27.7 KG/M2 | WEIGHT: 187 LBS | DIASTOLIC BLOOD PRESSURE: 92 MMHG | HEIGHT: 69 IN

## 2020-01-07 DIAGNOSIS — E78.2 MIXED HYPERLIPIDEMIA: ICD-10-CM

## 2020-01-07 DIAGNOSIS — Z72.0 TOBACCO ABUSE: Chronic | ICD-10-CM

## 2020-01-07 DIAGNOSIS — L30.0 NUMMULAR ECZEMA: ICD-10-CM

## 2020-01-07 DIAGNOSIS — E11.22 TYPE 2 DIABETES MELLITUS WITH STAGE 3 CHRONIC KIDNEY DISEASE, WITHOUT LONG-TERM CURRENT USE OF INSULIN (HCC): ICD-10-CM

## 2020-01-07 DIAGNOSIS — Z12.39 SCREENING FOR BREAST CANCER: Primary | ICD-10-CM

## 2020-01-07 DIAGNOSIS — I10 ESSENTIAL HYPERTENSION: ICD-10-CM

## 2020-01-07 DIAGNOSIS — Z11.59 ENCOUNTER FOR HEPATITIS C SCREENING TEST FOR LOW RISK PATIENT: ICD-10-CM

## 2020-01-07 DIAGNOSIS — E66.3 OVERWEIGHT (BMI 25.0-29.9): ICD-10-CM

## 2020-01-07 DIAGNOSIS — Z12.4 SCREENING FOR CERVICAL CANCER: ICD-10-CM

## 2020-01-07 DIAGNOSIS — E03.9 ACQUIRED HYPOTHYROIDISM: ICD-10-CM

## 2020-01-07 DIAGNOSIS — G60.9 IDIOPATHIC PERIPHERAL NEUROPATHY: ICD-10-CM

## 2020-01-07 DIAGNOSIS — N18.30 TYPE 2 DIABETES MELLITUS WITH STAGE 3 CHRONIC KIDNEY DISEASE, WITHOUT LONG-TERM CURRENT USE OF INSULIN (HCC): ICD-10-CM

## 2020-01-07 DIAGNOSIS — I63.81 RIGHT-SIDED LACUNAR INFARCTION (HCC): ICD-10-CM

## 2020-01-07 DIAGNOSIS — N83.202 LEFT OVARIAN CYST: ICD-10-CM

## 2020-01-07 DIAGNOSIS — N18.30 CHRONIC KIDNEY DISEASE, STAGE 3 (HCC): ICD-10-CM

## 2020-01-07 DIAGNOSIS — L30.0 NUMMULAR ECZEMA: Primary | ICD-10-CM

## 2020-01-07 DIAGNOSIS — J45.40 MODERATE PERSISTENT ASTHMA WITHOUT COMPLICATION: ICD-10-CM

## 2020-01-07 DIAGNOSIS — Z23 NEED FOR VACCINATION: ICD-10-CM

## 2020-01-07 PROBLEM — R29.898 WEAKNESS OF EXTREMITY: Status: RESOLVED | Noted: 2019-08-22 | Resolved: 2020-01-07

## 2020-01-07 PROCEDURE — 3066F NEPHROPATHY DOC TX: CPT | Performed by: FAMILY MEDICINE

## 2020-01-07 PROCEDURE — 99214 OFFICE O/P EST MOD 30 MIN: CPT | Performed by: FAMILY MEDICINE

## 2020-01-07 PROCEDURE — 90471 IMMUNIZATION ADMIN: CPT | Performed by: FAMILY MEDICINE

## 2020-01-07 PROCEDURE — 90682 RIV4 VACC RECOMBINANT DNA IM: CPT | Performed by: FAMILY MEDICINE

## 2020-01-07 PROCEDURE — 3008F BODY MASS INDEX DOCD: CPT | Performed by: FAMILY MEDICINE

## 2020-01-07 RX ORDER — FUROSEMIDE 40 MG/1
40 TABLET ORAL 2 TIMES DAILY
Qty: 90 TABLET | Refills: 0 | Status: SHIPPED | OUTPATIENT
Start: 2020-01-07 | End: 2020-06-22 | Stop reason: SDUPTHER

## 2020-01-07 RX ORDER — GABAPENTIN 400 MG/1
400 CAPSULE ORAL 3 TIMES DAILY
Qty: 270 CAPSULE | Refills: 1 | Status: SHIPPED | OUTPATIENT
Start: 2020-01-07 | End: 2020-10-30 | Stop reason: SDUPTHER

## 2020-01-07 RX ORDER — ALBUTEROL SULFATE 90 UG/1
2 AEROSOL, METERED RESPIRATORY (INHALATION) EVERY 6 HOURS PRN
Qty: 18 EACH | Refills: 5 | Status: SHIPPED | OUTPATIENT
Start: 2020-01-07 | End: 2020-07-01

## 2020-01-07 RX ORDER — LEVOTHYROXINE SODIUM 137 UG/1
137 TABLET ORAL DAILY
Qty: 90 TABLET | Refills: 0 | Status: SHIPPED | OUTPATIENT
Start: 2020-01-07 | End: 2020-06-15

## 2020-01-07 RX ORDER — BETAMETHASONE DIPROPIONATE 0.5 MG/G
CREAM TOPICAL 2 TIMES DAILY
Qty: 30 G | Refills: 0 | Status: SHIPPED | OUTPATIENT
Start: 2020-01-07 | End: 2020-03-18 | Stop reason: ALTCHOICE

## 2020-01-07 RX ORDER — BETAMETHASONE DIPROPIONATE 0.5 MG/G
OINTMENT TOPICAL 2 TIMES DAILY
Qty: 30 G | Refills: 0 | Status: SHIPPED | OUTPATIENT
Start: 2020-01-07 | End: 2020-03-18 | Stop reason: ALTCHOICE

## 2020-01-07 RX ORDER — ATORVASTATIN CALCIUM 40 MG/1
40 TABLET, FILM COATED ORAL
Qty: 90 TABLET | Refills: 1 | Status: SHIPPED | OUTPATIENT
Start: 2020-01-07 | End: 2020-08-07 | Stop reason: SDUPTHER

## 2020-01-07 NOTE — PROGRESS NOTES
Assessment/Plan:    Type 2 diabetes mellitus with stage 3 chronic kidney disease, without long-term current use of insulin (HCC)    Lab Results   Component Value Date    HGBA1C 6 2 08/23/2019   bunionette on both feet She needs to watch diet Patient to have labs and eye exam    Tobacco abuse  Continue to work on tapering off the cigarettes    Right-sided lacunar infarction [facility])  Check lipids and need to lower BP Patient to follwoup with neurology    Overweight (BMI 25 0-29 9)  conitnue to watch diet and try to exercise    Nummular eczema  conitnue betamethasone    Mixed hyperlipidemia  Patien tto conitnue atorvastatin and check labs    Left ovarian cyst  Check pelvic US and refer to GYN    Essential hypertension  Add back lasix 40 mg daily and recheck in one month    Chronic kidney disease, stage 3 ([address] )  Last labs were stable check labs in one month    Acquired hypothyroidism  Check labs and conitnue meds       Diagnoses and all orders for this visit:    Screening for breast cancer  -     Mammo screening bilateral w cad; Future    Idiopathic peripheral neuropathy  -     gabapentin (NEURONTIN) 400 mg capsule; Take 1 capsule (400 mg total) by mouth 3 (three) times a day    Mixed hyperlipidemia  -     atorvastatin (LIPITOR) 40 mg tablet; Take 1 tablet (40 mg total) by mouth daily with dinner  -     Lipid panel; Future    Acquired hypothyroidism  -     levothyroxine 137 mcg tablet; Take 1 tablet (137 mcg total) by mouth daily  -     TSH, 3rd generation with Free T4 reflex; Future    Moderate persistent asthma without complication  -     albuterol (PROVENTIL HFA,VENTOLIN HFA) 90 mcg/act inhaler; Inhale 2 puffs every 6 (six) hours as needed for wheezing    Need for vaccination  -     influenza vaccine, 5815-7766, quadrivalent, recombinant, PF, 0 5 mL, for patients 18 yr+ (FLUBLOK)    Essential hypertension  -     furosemide (LASIX) 40 mg tablet;  Take 1 tablet (40 mg total) by mouth 2 (two) times a day  - Comprehensive metabolic panel; Future  -     Lipid panel; Future    Type 2 diabetes mellitus with stage 3 chronic kidney disease, without long-term current use of insulin (HCC)  -     Hemoglobin A1C; Future  -     Microalbumin,Urine; Future  -     Ambulatory referral to Optometry; Future    Tobacco abuse    Right-sided lacunar infarction (HCC)    Chronic kidney disease, stage 3 (HCC)    Left ovarian cyst  -     Ambulatory referral to Obstetrics / Gynecology; Future  -     betamethasone, augmented, (DIPROLENE) 0 05 % ointment; Apply topically 2 (two) times a day  -     US pelvis complete w transvaginal; Future    Screening for cervical cancer  -     Ambulatory referral to Obstetrics / Gynecology; Future    Nummular eczema    Overweight (BMI 25 0-29  9)    Encounter for hepatitis C screening test for low risk patient  -     Hepatitis C antibody; Future          Subjective:   Chief Complaint   Patient presents with    Hypertension    Diabetes    Hypothyroidism          Patient ID: Riley Watson is a 60 y o  female      Patient is here for follwoup of her diabetes, hypertension, hyperlipidemia hypothyroidism her weight her CVA from 8/2019 and also the left ovarian cyst found during the 8 2019 hospitalization patient is due for foot exam today and needs to see eye doctor She intends to schedule at [facility] Patient last A1c was 6 2 in 8/2019 she is not checking sugars as she should Patient has cut down smoking she is at 5 per day now Patient BP is too high She is on atorvastatin 40 mg since 8/2019 [place] but has not had followup labs Patietn weight is stable She is trying to eat better and is getting exercise She is having swelling in feelt again She has not had flu shot She did not have mammogram nor pelvic US or GYN visit patient will do so at this time Patient has no compalitns other then the swelling       The following portions of the patient's history were reviewed and updated as appropriate: allergies, current medications, past medical history, past social history and problem list     Review of Systems   Constitutional: Negative for fatigue, fever and unexpected weight change  HENT: Negative for congestion, sinus pain and trouble swallowing  Eyes: Negative for discharge and visual disturbance  Respiratory: Negative for cough, chest tightness, shortness of breath and wheezing  Cardiovascular: Negative for chest pain and palpitations  Gastrointestinal: Negative for abdominal pain, blood in stool, constipation, diarrhea, nausea and vomiting  Genitourinary: Negative for difficulty urinating, dysuria, frequency and hematuria  Musculoskeletal: Negative for arthralgias, gait problem and joint swelling  Skin: Negative for rash and wound  Allergic/Immunologic: Negative for environmental allergies and food allergies  Neurological: Negative for dizziness, syncope, weakness, numbness and headaches  Hematological: Negative for adenopathy  Does not bruise/bleed easily  Psychiatric/Behavioral: Negative for confusion, decreased concentration and sleep disturbance  The patient is not nervous/anxious  Objective:  Patient's shoes and socks removed  Right Foot/Ankle   Right Foot Inspection  Skin Exam: skin normal and skin intact no dry skin, no warmth, no callus, no erythema, no maceration, no abnormal color, no pre-ulcer, no ulcer and no callus                          Toe Exam: ROM and strength within normal limits and right toe deformity  Sensory   Vibration: intact  Proprioception: intact   Monofilament testing: intact  Vascular  Capillary refills: < 3 seconds  The right DP pulse is 2+  The right PT pulse is 2+       Left Foot/Ankle  Left Foot Inspection  Skin Exam: skin normal and skin intactno dry skin, no warmth, no erythema, no maceration, normal color, no pre-ulcer, no ulcer and no callus                         Toe Exam: ROM and strength within normal limits and left toe deformity Sensory   Vibration: intact  Proprioception: intact  Monofilament: intact  Vascular  Capillary refills: < 3 seconds  The left DP pulse is 2+  The left PT pulse is 2+  Assign Risk Category:  Deformity present; No loss of protective sensation; No weak pulses       Risk: 1      BMI Counseling: Body mass index is 27 62 kg/m²  The BMI is above normal  Nutrition recommendations include decreasing portion sizes, encouraging healthy choices of fruits and vegetables, decreasing fast food intake, consuming healthier snacks, moderation in carbohydrate intake, increasing intake of lean protein and reducing intake of saturated and trans fat  Exercise recommendations include exercising 3-5 times per week  /92   Ht 5' 9" (1 753 m)   Wt 84 8 kg (187 lb)   BMI 27 62 kg/m²          Physical Exam   Constitutional: She is oriented to person, place, and time  She appears well-developed and well-nourished  HENT:   Head: Normocephalic and atraumatic  Right Ear: Hearing, tympanic membrane and external ear normal    Left Ear: Hearing, tympanic membrane and external ear normal    Eyes: Pupils are equal, round, and reactive to light  Conjunctivae and EOM are normal    Neck: Neck supple  No thyromegaly present  Cardiovascular: Normal rate and normal heart sounds  Pulses are no weak pulses  Pulses:       Dorsalis pedis pulses are 2+ on the right side, and 2+ on the left side  Posterior tibial pulses are 2+ on the right side, and 2+ on the left side  Pulmonary/Chest: Effort normal and breath sounds normal  She has no wheezes  She has no rales  Abdominal: Soft  Bowel sounds are normal  She exhibits no distension  There is no tenderness  Musculoskeletal: She exhibits no edema or tenderness  Feet:   Right Foot:   Skin Integrity: Negative for ulcer, skin breakdown, erythema, warmth, callus or dry skin     Left Foot:   Skin Integrity: Negative for ulcer, skin breakdown, erythema, warmth, callus or dry skin    Lymphadenopathy:     She has no cervical adenopathy  Neurological: She is alert and oriented to person, place, and time  No cranial nerve deficit  Coordination normal    Skin: Skin is warm and dry  No rash noted  Psychiatric: She has a normal mood and affect  Her behavior is normal  Judgment and thought content normal    Nursing note and vitals reviewed

## 2020-01-07 NOTE — ASSESSMENT & PLAN NOTE
Lab Results   Component Value Date    HGBA1C 6 2 08/23/2019   bunionette on both feet She needs to watch diet Patient to have labs and eye exam

## 2020-01-07 NOTE — PATIENT INSTRUCTIONS
10% - bad control"> 10% - bad control,Hemoglobin A1c (HbA1c) greater than 10% indicating poor diabetic control,Haemoglobin A1c greater than 10% indicating poor diabetic control">   Diabetes Mellitus Type 2 in Adults, Ambulatory Care   GENERAL INFORMATION:   Diabetes mellitus type 2  is a disease that affects how your body uses glucose (sugar)  Insulin helps move sugar out of the blood so it can be used for energy  Normally, when the blood sugar level increases, the pancreas makes more insulin  Type 2 diabetes develops because either the body cannot make enough insulin, or it cannot use the insulin correctly  After many years, your pancreas may stop making insulin  Common symptoms include the following:   · More hunger or thirst than usual     · Frequent urination     · Weight loss without trying     · Blurred vision  Seek immediate care for the following symptoms:   · Severe abdominal pain, or pain that spreads to your back  You may also be vomiting  · Trouble staying awake or focusing    · Shaking or sweating    · Blurred or double vision    · Breath has a fruity, sweet smell    · Breathing is deep and labored, or rapid and shallow    · Heartbeat is fast and weak  Treatment for diabetes mellitus type 2  includes keeping your blood sugar at a normal level  You must eat the right foods, and exercise regularly  You may also need medicine if you cannot control your blood sugar level with nutrition and exercise  Manage diabetes mellitus type 2:   · Check your blood sugar level  You will be taught how to check a small drop of blood in a glucose monitor  Ask your healthcare provider when and how often to check during the day  Ask your healthcare provider what your blood sugar levels should be when you check them  · Keep track of carbohydrates (sugar and starchy foods)  Your blood sugar level can get too high if you eat too many carbohydrates   Your dietitian will help you plan meals and snacks that have the right amount of carbohydrates  · Eat low-fat foods  Some examples are skinless chicken and low-fat milk  · Eat less sodium (salt)  Some examples of high-sodium foods to limit are soy sauce, potato chips, and soup  Do not add salt to food you cook  Limit your use of table salt  · Eat high-fiber foods  Foods that are a good source of fiber include vegetables, whole grain bread, and beans  · Limit alcohol  Alcohol affects your blood sugar level and can make it harder to manage your diabetes  Women should limit alcohol to 1 drink a day  Men should limit alcohol to 2 drinks a day  A drink of alcohol is 12 ounces of beer, 5 ounces of wine, or 1½ ounces of liquor  · Get regular exercise  Exercise can help keep your blood sugar level steady, decrease your risk of heart disease, and help you lose weight  Exercise for at least 30 minutes, 5 days a week  Include muscle strengthening activities 2 days each week  Work with your healthcare provider to create an exercise plan  · Check your feet each day  for injuries or open sores  Ask your healthcare provider for activities you can do if you have an open sore  · Quit smoking  If you smoke, it is never too late to quit  Smoking can worsen the problems that may occur with diabetes  Ask your healthcare provider for information about how to stop smoking if you are having trouble quitting  · Ask about your weight:  Ask healthcare providers if you need to lose weight, and how much to lose  Ask them to help you with a weight loss program  Even a 10 to 15 pound weight loss can help you manage your blood sugar level  · Carry medical alert identification  Wear medical alert jewelry or carry a card that says you have diabetes  Ask your healthcare provider where to get these items  · Ask about vaccines  Diabetes puts you at risk of serious illness if you get the flu, pneumonia, or hepatitis   Ask your healthcare provider if you should get a flu, pneumonia, or hepatitis B vaccine, and when to get the vaccine  Follow up with your healthcare provider as directed:  Write down your questions so you remember to ask them during your visits  CARE AGREEMENT:   You have the right to help plan your care  Learn about your health condition and how it may be treated  Discuss treatment options with your caregivers to decide what care you want to receive  You always have the right to refuse treatment  The above information is an  only  It is not intended as medical advice for individual conditions or treatments  Talk to your doctor, nurse or pharmacist before following any medical regimen to see if it is safe and effective for you  © 2014 [address] is for End User's use only and may not be sold, redistributed or otherwise used for commercial purposes  All illustrations and images included in CareNotes® are the copyrighted property of [employer]  or [employer]an Bruce

## 2020-01-14 ENCOUNTER — HOSPITAL ENCOUNTER (OUTPATIENT)
Dept: ULTRASOUND IMAGING | Facility: MEDICAL CENTER | Age: 65
Discharge: HOME | End: 2020-01-14
Payer: COMMERCIAL

## 2020-01-14 DIAGNOSIS — N83.202 LEFT OVARIAN CYST: ICD-10-CM

## 2020-01-14 PROCEDURE — 76856 US EXAM PELVIC COMPLETE: CPT

## 2020-01-14 PROCEDURE — 76830 TRANSVAGINAL US NON-OB: CPT

## 2020-02-04 ENCOUNTER — OFFICE VISIT (OUTPATIENT)
Dept: FAMILY MEDICINE CLINIC | Facility: CLINIC | Age: 65
End: 2020-02-04
Payer: COMMERCIAL

## 2020-02-04 VITALS
DIASTOLIC BLOOD PRESSURE: 88 MMHG | SYSTOLIC BLOOD PRESSURE: 160 MMHG | BODY MASS INDEX: 28.29 KG/M2 | WEIGHT: 191 LBS | HEIGHT: 69 IN

## 2020-02-04 DIAGNOSIS — N18.30 TYPE 2 DIABETES MELLITUS WITH STAGE 3 CHRONIC KIDNEY DISEASE, WITHOUT LONG-TERM CURRENT USE OF INSULIN (HCC): ICD-10-CM

## 2020-02-04 DIAGNOSIS — N18.30 CHRONIC KIDNEY DISEASE, STAGE 3 (HCC): ICD-10-CM

## 2020-02-04 DIAGNOSIS — M77.02 MEDIAL EPICONDYLITIS OF ELBOW, LEFT: ICD-10-CM

## 2020-02-04 DIAGNOSIS — Z72.0 TOBACCO ABUSE: Chronic | ICD-10-CM

## 2020-02-04 DIAGNOSIS — E11.22 TYPE 2 DIABETES MELLITUS WITH STAGE 3 CHRONIC KIDNEY DISEASE, WITHOUT LONG-TERM CURRENT USE OF INSULIN (HCC): ICD-10-CM

## 2020-02-04 DIAGNOSIS — I10 ESSENTIAL HYPERTENSION: Primary | ICD-10-CM

## 2020-02-04 LAB — SL AMB POCT HEMOGLOBIN AIC: 6 (ref ?–6.5)

## 2020-02-04 PROCEDURE — 3077F SYST BP >= 140 MM HG: CPT | Performed by: FAMILY MEDICINE

## 2020-02-04 PROCEDURE — 3079F DIAST BP 80-89 MM HG: CPT | Performed by: FAMILY MEDICINE

## 2020-02-04 PROCEDURE — 3044F HG A1C LEVEL LT 7.0%: CPT | Performed by: FAMILY MEDICINE

## 2020-02-04 PROCEDURE — 83036 HEMOGLOBIN GLYCOSYLATED A1C: CPT | Performed by: FAMILY MEDICINE

## 2020-02-04 PROCEDURE — 99214 OFFICE O/P EST MOD 30 MIN: CPT | Performed by: FAMILY MEDICINE

## 2020-02-04 PROCEDURE — 3066F NEPHROPATHY DOC TX: CPT | Performed by: FAMILY MEDICINE

## 2020-02-04 PROCEDURE — 3008F BODY MASS INDEX DOCD: CPT | Performed by: FAMILY MEDICINE

## 2020-02-04 RX ORDER — HYDRALAZINE HYDROCHLORIDE 100 MG/1
100 TABLET, FILM COATED ORAL 3 TIMES DAILY
Qty: 90 TABLET | Refills: 1 | Status: SHIPPED | OUTPATIENT
Start: 2020-02-04 | End: 2020-04-08 | Stop reason: SDUPTHER

## 2020-02-04 RX ORDER — METHYLPREDNISOLONE 4 MG/1
TABLET ORAL
Qty: 21 EACH | Refills: 0 | Status: SHIPPED | OUTPATIENT
Start: 2020-02-04 | End: 2020-03-18 | Stop reason: ALTCHOICE

## 2020-02-04 NOTE — PATIENT INSTRUCTIONS
Chronic Kidney Disease   AMBULATORY CARE:   Chronic kidney disease (CKD)  is the gradual and permanent loss of kidney function  Normally, the kidneys remove fluid, chemicals, and waste from your blood  These wastes are turned into urine by your kidneys  CKD may worsen over time and lead to kidney failure  Common symptoms include the following:   · Changes in how often you need to urinate    · Swelling in your arms, legs, or feet    · Shortness of breath    · Fatigue or weakness    · Bad or bitter taste in your mouth    · Nausea, vomiting, or loss of appetite  Seek care immediately if:   · You are confused and very drowsy  · You have a seizure  · You have shortness of breath  Contact your healthcare provider if:   · You suddenly gain or lose more weight than your healthcare provider has told you is okay  · You have itchy skin or a rash  · You urinate more or less than you normally do  · You have blood in your urine  · You have nausea and repeated vomiting  · You have fatigue or muscle weakness  · You have hiccups that will not stop  · You have questions or concerns about your condition or care  Treatment for CKD:  Medicines may be given to decrease blood pressure and get rid of extra fluid  You may also receive medicine to manage health conditions that may occur with CKD  Dialysis is a treatment to remove chemicals and waste from your blood when your kidneys can no longer do this  Surgery may be needed to create an arteriovenous fistula (AVF) in your arm or insert a catheter into your abdomen so that you can receive dialysis  A kidney transplant may be done if your CKD becomes severe  Manage CKD:   · Maintain a healthy weight  Ask your healthcare provider how much you should weigh  Ask him to help you create a weight loss plan if you are overweight  · Exercise 30 to 60 minutes a day, 4 to 7 times a week, or as directed  Ask about the best exercise plan for you   Regular exercise can help you manage CKD, high blood pressure, and diabetes  · Follow your healthcare provider's advice about what to eat and drink  He may tell you to eat food low in sodium (salt), potassium, phosphorus, or protein  You may need to see a dietitian if you need help planning meals  Ask how much liquid to drink each day and which liquids are best for you  · Limit alcohol  Ask how much alcohol is safe for you to drink  A drink of alcohol is 12 ounces of beer, 5 ounces of wine, or 1½ ounces of liquor  · Do not smoke  Nicotine and other chemicals in cigarettes and cigars can cause lung and kidney damage  Ask your healthcare provider for information if you currently smoke and need help to quit  E-cigarettes or smokeless tobacco still contain nicotine  Talk to your healthcare provider before you use these products  · Ask your healthcare provider if you need vaccines  Infections such as pneumonia, influenza, and hepatitis can be more harmful or more likely to occur in a person who has CKD  Vaccines reduce your risk of infection with these viruses  Follow up with your healthcare provider as directed:  Write down your questions so you remember to ask them during your visits  © 2017 [address] [employer] Information is for End User's use only and may not be sold, redistributed or otherwise used for commercial purposes  All illustrations and images included in CareNotes® are the copyrighted property of A "[employer] A [employer]  or Saran Bruce  The above information is an  only  It is not intended as medical advice for individual conditions or treatments  Talk to your doctor, nurse or pharmacist before following any medical regimen to see if it is safe and effective for you  Chronic Hypertension   WHAT YOU NEED TO KNOW:   Hypertension is high blood pressure (BP)  Your BP is the force of your blood moving against the walls of your arteries  Normal BP is less than 120/80  Prehypertension is between 120/80 and 139/89  Hypertension is 140/90 or higher  Hypertension causes your BP to get so high that your heart has to work much harder than normal  This can damage your heart  Chronic hypertension is a long-term condition that you can control with a healthy lifestyle or medicines  A controlled blood pressure helps protect your organs, such as your heart, lungs, brain, and kidneys  DISCHARGE INSTRUCTIONS:   Call 911 for any of the following:   · You have discomfort in your chest that feels like squeezing, pressure, fullness, or pain  · You become confused or have difficulty speaking  · You suddenly feel lightheaded or have trouble breathing  · You have pain or discomfort in your back, neck, jaw, stomach, or arm  Return to the emergency department if:   · You have a severe headache or vision loss  · You have weakness in an arm or leg  Contact your healthcare provider if:   · You feel faint, dizzy, confused, or drowsy  · You have been taking your BP medicine and your BP is still higher than your healthcare provider says it should be  · You have questions or concerns about your condition or care  Medicines: You may need any of the following:  · Medicine  may be used to help lower your BP  You may need more than one type of medicine  Take the medicine exactly as directed  · Diuretics  help decrease extra fluid that collects in your body  This will help lower your BP  You may urinate more often while you take this medicine  · Cholesterol medicine  helps lower your cholesterol level  A low cholesterol level helps prevent heart disease and makes it easier to control your blood pressure  · Take your medicine as directed  Contact your healthcare provider if you think your medicine is not helping or if you have side effects  Tell him or her if you are allergic to any medicine  Keep a list of the medicines, vitamins, and herbs you take   Include the amounts, and when and why you take them  Bring the list or the pill bottles to follow-up visits  Carry your medicine list with you in case of an emergency  Follow up with your healthcare provider as directed: You will need to return to have your blood pressure checked and to have other lab tests done  Write down your questions so you remember to ask them during your visits  Manage chronic hypertension:  Talk with your healthcare provider about these and other ways to manage hypertension:  · Take your BP at home  Sit and rest for 5 minutes before you take your BP  Extend your arm and support it on a flat surface  Your arm should be at the same level as your heart  Follow the directions that came with your BP monitor  If possible, take at least 2 BP readings each time  Take your BP at least twice a day at the same times each day, such as morning and evening  Keep a record of your BP readings and bring it to your follow-up visits  Ask your healthcare provider what your blood pressure should be  · Limit sodium (salt) as directed  Too much sodium can affect your fluid balance  Check labels to find low-sodium or no-salt-added foods  Some low-sodium foods use potassium salts for flavor  Too much potassium can also cause health problems  Your healthcare provider will tell you how much sodium and potassium are safe for you to have in a day  He or she may recommend that you limit sodium to 2,300 mg a day  · Follow the meal plan recommended by your healthcare provider  A dietitian or your provider can give you more information on low-sodium plans or the DASH (Dietary Approaches to Stop Hypertension) eating plan  The DASH plan is low in sodium, unhealthy fats, and total fat  It is high in potassium, calcium, and fiber  · Exercise to maintain a healthy weight  Exercise at least 30 minutes per day, on most days of the week  This will help decrease your blood pressure   Ask about the best exercise plan for you      · Decrease stress  This may help lower your BP  Learn ways to relax, such as deep breathing or listening to music  · Limit alcohol  Women should limit alcohol to 1 drink a day  Men should limit alcohol to 2 drinks a day  A drink of alcohol is 12 ounces of beer, 5 ounces of wine, or 1½ ounces of liquor  · Do not smoke  Nicotine and other chemicals in cigarettes and cigars can increase your BP and also cause lung damage  Ask your healthcare provider for information if you currently smoke and need help to quit  E-cigarettes or smokeless tobacco still contain nicotine  Talk to your healthcare provider before you use these products  © 2017 [address] [facility] Information is for End User's use only and may not be sold, redistributed or otherwise used for commercial purposes  All illustrations and images included in CareNotes® are the copyrighted property of A D A M , Inc  or Saran Bruce  The above information is an  only  It is not intended as medical advice for individual conditions or treatments  Talk to your doctor, nurse or pharmacist before following any medical regimen to see if it is safe and effective for you

## 2020-02-04 NOTE — PROGRESS NOTES
Assessment/Plan:    Type 2 diabetes mellitus with stage 3 chronic kidney disease, without long-term current use of insulin (HCC)    Lab Results   Component Value Date    HGBA1C 6 0 02/04/2020   A1c is 6 Patient to continue with current care    Tobacco abuse  Discuss need to stop smoking    Medial epicondylitis of elbow, left  Medrol dose pack and physical therapy trial    Essential hypertension  Increase the hydralazine to 100mg TID    Chronic kidney disease, stage 3 (HCC)  Increase the hydralazine Check labs and refer to neprhology Alejandro Jane in 2 weeks        Diagnoses and all orders for this visit:    Essential hypertension  -     hydrALAZINE (APRESOLINE) 100 MG tablet; Take 1 tablet (100 mg total) by mouth 3 (three) times a day  -     Ambulatory referral to Nephrology; Future    Chronic kidney disease, stage 3 ([facility] [address] )  -     Ambulatory referral to Nephrology; Future    Type 2 diabetes mellitus with stage 3 chronic kidney disease, without long-term current use of insulin ([facility])  -     POCT hemoglobin A1c    Medial epicondylitis of elbow, left  -     methylPREDNISolone 4 MG tablet therapy pack; Use as directed on package  -     Ambulatory referral to Physical Therapy; Future    Tobacco abuse          Subjective:   Chief Complaint   Patient presents with    Hypertension     no refills needed           Patient ID: Gerhardt Quan is a 60 y o  female      Patient is here for follwoup of her hypertension and chronic renal disease Patient BP is elevated still She is back ont he lasix 40 mg and is still having elevated BP She has not gotten a home BP monitor She also drank 16 oz coffee and had cigarette on her way here Patient has no dizziness no headache and no chest pain or palpiations She is overdue for labs Patietn is not checking her sugar She si also compliaining of pain in the medial aspect of elbow for last 2 weeks She ahs tried tylenol and no help       The following portions of the patient's history were reviewed and updated as appropriate: allergies, current medications, past medical history, past social history and problem list     Review of Systems   Constitutional: Negative for fatigue, fever and unexpected weight change  HENT: Negative for congestion, sinus pain and trouble swallowing  Eyes: Negative for discharge and visual disturbance  Respiratory: Negative for cough, chest tightness, shortness of breath and wheezing  Cardiovascular: Negative for chest pain, palpitations and leg swelling  Gastrointestinal: Negative for abdominal pain, blood in stool, constipation, diarrhea, nausea and vomiting  Genitourinary: Negative for difficulty urinating, dysuria, frequency and hematuria  Musculoskeletal: Positive for arthralgias  Negative for gait problem and joint swelling  Left elbow pain and tenderenss   Skin: Negative for rash and wound  Allergic/Immunologic: Negative for environmental allergies and food allergies  Neurological: Negative for dizziness, syncope, weakness, numbness and headaches  Hematological: Negative for adenopathy  Does not bruise/bleed easily  Psychiatric/Behavioral: Negative for confusion, decreased concentration and sleep disturbance  The patient is not nervous/anxious  Objective:      /88   Ht 5' 9" (1 753 m)   Wt 86 6 kg (191 lb)   BMI 28 21 kg/m²          Physical Exam   Constitutional: She is oriented to person, place, and time  She appears well-developed and well-nourished  HENT:   Head: Normocephalic and atraumatic  Right Ear: Hearing, tympanic membrane and external ear normal    Left Ear: Hearing, tympanic membrane and external ear normal    Eyes: Pupils are equal, round, and reactive to light  Conjunctivae and EOM are normal    Neck: Neck supple  No thyromegaly present  Cardiovascular: Normal rate and normal heart sounds  Pulmonary/Chest: Effort normal and breath sounds normal  She has no wheezes  She has no rales     Abdominal: Soft  Bowel sounds are normal  She exhibits no distension  There is no tenderness  Musculoskeletal: She exhibits no edema or tenderness  Pain to palpation left medial epicondyle Full ROM howeve increase pain with supination and pronation of the left elbow   Lymphadenopathy:     She has no cervical adenopathy  Neurological: She is alert and oriented to person, place, and time  No cranial nerve deficit  Coordination normal    Skin: Skin is warm and dry  No rash noted  Psychiatric: She has a normal mood and affect   Her behavior is normal  Judgment and thought content normal

## 2020-02-04 NOTE — ASSESSMENT & PLAN NOTE
Lab Results   Component Value Date    HGBA1C 6 0 02/04/2020   A1c is 6 Patient to continue with current care

## 2020-03-11 ENCOUNTER — HOSPITAL ENCOUNTER (EMERGENCY)
Facility: HOSPITAL | Age: 65
Discharge: HOME | End: 2020-03-11
Attending: EMERGENCY MEDICINE | Admitting: EMERGENCY MEDICINE
Payer: COMMERCIAL

## 2020-03-11 ENCOUNTER — APPOINTMENT (EMERGENCY)
Dept: RADIOLOGY | Facility: HOSPITAL | Age: 65
End: 2020-03-11
Payer: COMMERCIAL

## 2020-03-11 ENCOUNTER — APPOINTMENT (EMERGENCY)
Dept: CT IMAGING | Facility: HOSPITAL | Age: 65
End: 2020-03-11
Payer: COMMERCIAL

## 2020-03-11 VITALS
TEMPERATURE: 98.4 F | HEART RATE: 77 BPM | WEIGHT: 185.41 LBS | DIASTOLIC BLOOD PRESSURE: 88 MMHG | BODY MASS INDEX: 27.38 KG/M2 | OXYGEN SATURATION: 98 % | SYSTOLIC BLOOD PRESSURE: 199 MMHG | RESPIRATION RATE: 16 BRPM

## 2020-03-11 DIAGNOSIS — S32.010A COMPRESSION FRACTURE OF L1 VERTEBRA, INITIAL ENCOUNTER (HCC): Primary | ICD-10-CM

## 2020-03-11 DIAGNOSIS — S06.0X9A CONCUSSION: ICD-10-CM

## 2020-03-11 DIAGNOSIS — S00.83XA CONTUSION OF FACE, INITIAL ENCOUNTER: ICD-10-CM

## 2020-03-11 LAB
ANION GAP SERPL CALCULATED.3IONS-SCNC: 10 MMOL/L (ref 4–13)
BASOPHILS # BLD AUTO: 0.02 THOUSANDS/ΜL (ref 0–0.1)
BASOPHILS NFR BLD AUTO: 0 % (ref 0–1)
BUN SERPL-MCNC: 19 MG/DL (ref 5–25)
CALCIUM SERPL-MCNC: 8.9 MG/DL (ref 8.3–10.1)
CHLORIDE SERPL-SCNC: 104 MMOL/L (ref 100–108)
CO2 SERPL-SCNC: 25 MMOL/L (ref 21–32)
CREAT SERPL-MCNC: 1.03 MG/DL (ref 0.6–1.3)
EOSINOPHIL # BLD AUTO: 0.15 THOUSAND/ΜL (ref 0–0.61)
EOSINOPHIL NFR BLD AUTO: 2 % (ref 0–6)
ERYTHROCYTE [DISTWIDTH] IN BLOOD BY AUTOMATED COUNT: 12.6 % (ref 11.6–15.1)
GFR SERPL CREATININE-BSD FRML MDRD: 58 ML/MIN/1.73SQ M
GLUCOSE SERPL-MCNC: 160 MG/DL (ref 65–140)
HCT VFR BLD AUTO: 43.2 % (ref 34.8–46.1)
HGB BLD-MCNC: 13.5 G/DL (ref 11.5–15.4)
IMM GRANULOCYTES # BLD AUTO: 0.04 THOUSAND/UL (ref 0–0.2)
IMM GRANULOCYTES NFR BLD AUTO: 1 % (ref 0–2)
LYMPHOCYTES # BLD AUTO: 1.7 THOUSANDS/ΜL (ref 0.6–4.47)
LYMPHOCYTES NFR BLD AUTO: 21 % (ref 14–44)
MCH RBC QN AUTO: 30.5 PG (ref 26.8–34.3)
MCHC RBC AUTO-ENTMCNC: 31.3 G/DL (ref 31.4–37.4)
MCV RBC AUTO: 98 FL (ref 82–98)
MONOCYTES # BLD AUTO: 0.51 THOUSAND/ΜL (ref 0.17–1.22)
MONOCYTES NFR BLD AUTO: 6 % (ref 4–12)
NEUTROPHILS # BLD AUTO: 5.69 THOUSANDS/ΜL (ref 1.85–7.62)
NEUTS SEG NFR BLD AUTO: 70 % (ref 43–75)
NRBC BLD AUTO-RTO: 0 /100 WBCS
PLATELET # BLD AUTO: 214 THOUSANDS/UL (ref 149–390)
PMV BLD AUTO: 10.5 FL (ref 8.9–12.7)
POTASSIUM SERPL-SCNC: 3.8 MMOL/L (ref 3.5–5.3)
RBC # BLD AUTO: 4.42 MILLION/UL (ref 3.81–5.12)
SODIUM SERPL-SCNC: 139 MMOL/L (ref 136–145)
WBC # BLD AUTO: 8.11 THOUSAND/UL (ref 4.31–10.16)

## 2020-03-11 PROCEDURE — 70450 CT HEAD/BRAIN W/O DYE: CPT

## 2020-03-11 PROCEDURE — 72100 X-RAY EXAM L-S SPINE 2/3 VWS: CPT

## 2020-03-11 PROCEDURE — 96374 THER/PROPH/DIAG INJ IV PUSH: CPT

## 2020-03-11 PROCEDURE — 72125 CT NECK SPINE W/O DYE: CPT

## 2020-03-11 PROCEDURE — 97760 ORTHOTIC MGMT&TRAING 1ST ENC: CPT

## 2020-03-11 PROCEDURE — 99284 EMERGENCY DEPT VISIT MOD MDM: CPT | Performed by: EMERGENCY MEDICINE

## 2020-03-11 PROCEDURE — 74176 CT ABD & PELVIS W/O CONTRAST: CPT

## 2020-03-11 PROCEDURE — 80048 BASIC METABOLIC PNL TOTAL CA: CPT | Performed by: EMERGENCY MEDICINE

## 2020-03-11 PROCEDURE — 36415 COLL VENOUS BLD VENIPUNCTURE: CPT | Performed by: EMERGENCY MEDICINE

## 2020-03-11 PROCEDURE — 71250 CT THORAX DX C-: CPT

## 2020-03-11 PROCEDURE — 85025 COMPLETE CBC W/AUTO DIFF WBC: CPT | Performed by: EMERGENCY MEDICINE

## 2020-03-11 PROCEDURE — 99285 EMERGENCY DEPT VISIT HI MDM: CPT

## 2020-03-11 RX ORDER — METHOCARBAMOL 750 MG/1
750 TABLET, FILM COATED ORAL 3 TIMES DAILY PRN
Qty: 42 TABLET | Refills: 0 | Status: SHIPPED | OUTPATIENT
Start: 2020-03-11 | End: 2020-04-22 | Stop reason: SDUPTHER

## 2020-03-11 RX ORDER — IBUPROFEN 600 MG/1
600 TABLET ORAL EVERY 8 HOURS PRN
Qty: 30 TABLET | Refills: 0 | Status: SHIPPED | OUTPATIENT
Start: 2020-03-11 | End: 2020-04-22 | Stop reason: SDUPTHER

## 2020-03-11 RX ORDER — KETOROLAC TROMETHAMINE 30 MG/ML
15 INJECTION, SOLUTION INTRAMUSCULAR; INTRAVENOUS ONCE
Status: COMPLETED | OUTPATIENT
Start: 2020-03-11 | End: 2020-03-11

## 2020-03-11 RX ADMIN — KETOROLAC TROMETHAMINE 15 MG: 30 INJECTION, SOLUTION INTRAMUSCULAR at 13:17

## 2020-03-11 NOTE — ED PROVIDER NOTES
History  Chief Complaint   Patient presents with   Robbi Petros Fall     Patient presents following fall down stairs 1 week ago, reports that she fell as a result of feeling dizzy  Reports intermittent dizziness "when I get up too quick"  Struck head during fall, and pain is worsening to head and back  No thinners   Dizziness     59 yo female comes for evaluatoin, of HA, dizziness, and pain since falling down stairs about 1 week ago  She thinks she might have passed out at the top of the stairs, because she doesn't really recall what caused her to fall, but recalls being conscious as she was taking impact at the bottom of the stairs  She sustatined hematoma, bruising around the left periorbital area, pain of the back of neck with some bruising, pain of the left flank with some bruising, and pain under and around right breast and chest area  Her biggest complaint is intermittent dizziness, nausea, without vomiting  She is getting lightheaded but has not had any syncopal spells  History provided by:  Patient  Fall   Mechanism of injury: fall    Injury location:  Head/neck and torso  Torso injury location:  Back  Incident location:  Home  Time since incident:  7 days  Fall:     Fall occurred:  Down stairs    Height of fall:  1 flight  Associated symptoms: back pain, headaches and nausea    Associated symptoms: no abdominal pain, no chest pain, no neck pain and no vomiting        Prior to Admission Medications   Prescriptions Last Dose Informant Patient Reported? Taking?    albuterol (PROVENTIL HFA,VENTOLIN HFA) 90 mcg/act inhaler   No No   Sig: Inhale 2 puffs every 6 (six) hours as needed for wheezing   aspirin (ECOTRIN LOW STRENGTH) 81 mg EC tablet   No No   Sig: Take 1 tablet (81 mg total) by mouth daily   atorvastatin (LIPITOR) 40 mg tablet   No No   Sig: Take 1 tablet (40 mg total) by mouth daily with dinner   betamethasone dipropionate (DIPROSONE) 0 05 % cream   No No   Sig: Apply topically 2 (two) times a day betamethasone, augmented, (DIPROLENE) 0 05 % ointment   No No   Sig: Apply topically 2 (two) times a day   cyclobenzaprine (FLEXERIL) 10 mg tablet   No No   Sig: Take 1 tablet (10 mg total) by mouth 3 (three) times a day as needed for muscle spasms for up to 10 days   furosemide (LASIX) 40 mg tablet   No No   Sig: Take 1 tablet (40 mg total) by mouth 2 (two) times a day   gabapentin (NEURONTIN) 400 mg capsule   No No   Sig: Take 1 capsule (400 mg total) by mouth 3 (three) times a day   hydrALAZINE (APRESOLINE) 100 MG tablet   No No   Sig: Take 1 tablet (100 mg total) by mouth 3 (three) times a day   levothyroxine 137 mcg tablet   No No   Sig: Take 1 tablet (137 mcg total) by mouth daily   methylPREDNISolone 4 MG tablet therapy pack   No No   Sig: Use as directed on package      Facility-Administered Medications: None       Past Medical History:   Diagnosis Date    Acute renal failure (ARF) (HCC)     Anemia     Asthma     Atopic dermatitis     Chronic kidney disease     Stage 3    Colon polyps     Diabetes mellitus (HCC)     type 2    Disease of thyroid gland     hypothyroidism    Diverticulosis     Edema of both legs     Gout     H/O colonoscopy     H/O mammogram     Hemorrhoids     Hyperlipidemia     Hypertension     Nephrosclerosis     Pain in unspecified foot     Peripheral neuropathy     Phlebitis     Venous stasis dermatitis of both lower extremities     Wrist joint pain     LEFT       Past Surgical History:   Procedure Laterality Date    CERVIX SURGERY      COLONOSCOPY N/A 5/31/2016    Procedure: COLONOSCOPY;  Surgeon: Carlene Garcia MD;  Location: [facility]; Service:     VEIN SURGERY         Family History   Problem Relation Age of Onset    Diabetes Mother         Type 2 as per allscripts    Heart attack Father     Diabetes type II Sister     Hypertension Other      I have reviewed and agree with the history as documented      E-Cigarette/Vaping    E-Cigarette Use Never User E-Cigarette/Vaping Substances     Social History     Tobacco Use    Smoking status: Current Every Day Smoker     Packs/day: 0 50     Years: 15 00     Pack years: 7 50     Types: Cigarettes    Smokeless tobacco: Never Used    Tobacco comment: 30   Substance Use Topics    Alcohol use: Yes     Comment: social    Drug use: No       Review of Systems   Constitutional: Negative for appetite change, chills and fever  HENT: Negative for sore throat  Respiratory: Negative for cough, shortness of breath and wheezing  Cardiovascular: Negative for chest pain and palpitations  Gastrointestinal: Positive for nausea  Negative for abdominal pain, diarrhea and vomiting  Genitourinary: Negative for dysuria and hematuria  Musculoskeletal: Positive for back pain  Negative for neck pain  Skin: Negative for rash  Neurological: Positive for dizziness, light-headedness and headaches  Negative for weakness  Psychiatric/Behavioral: Negative for suicidal ideas  All other systems reviewed and are negative  Physical Exam  Physical Exam   Constitutional: She appears well-developed and well-nourished  No distress  HENT:   Head: Normocephalic  Head is without raccoon's eyes, without Pisano's sign, without abrasion, without contusion, without laceration, without right periorbital erythema and without left periorbital erythema  Right Ear: Tympanic membrane and external ear normal  No lacerations  Left Ear: Tympanic membrane and external ear normal  No lacerations  Nose: Nose normal  No nose lacerations, nasal deformity, septal deviation or nasal septal hematoma  No epistaxis  Mouth/Throat: Normal dentition  Eyes: Pupils are equal, round, and reactive to light  Conjunctivae and EOM are normal    Neck: Trachea normal, normal range of motion, full passive range of motion without pain and phonation normal  Neck supple  No spinous process tenderness (Cspine cleared) present     Cardiovascular: Normal rate, regular rhythm, normal heart sounds and normal pulses  Pulmonary/Chest: Breath sounds normal  No respiratory distress  She has no decreased breath sounds  She exhibits tenderness  She exhibits no mass, no crepitus and no deformity  Right breast exhibits no tenderness  Left breast exhibits no tenderness  Abdominal: Normal appearance  There is no tenderness  Musculoskeletal:        Right shoulder: Normal         Left shoulder: Normal         Right elbow: Normal        Left elbow: Normal         Right wrist: Normal         Left wrist: Normal         Right hip: Normal         Left hip: Normal         Right knee: Normal         Left knee: Normal         Right ankle: Normal         Left ankle: Normal         Cervical back: Normal         Thoracic back: Normal         Lumbar back: She exhibits decreased range of motion, tenderness, bony tenderness and pain  Back:         Right foot: Normal         Left foot: Normal    Neurological: She is alert  She has normal strength  No cranial nerve deficit or sensory deficit  GCS eye subscore is 4  GCS verbal subscore is 5  GCS motor subscore is 6  Skin: Skin is warm, dry and intact  She is not diaphoretic  Psychiatric: She has a normal mood and affect  Her speech is normal    Nursing note and vitals reviewed        Vital Signs  ED Triage Vitals   Temperature Pulse Respirations Blood Pressure SpO2   03/11/20 1253 03/11/20 1253 03/11/20 1253 03/11/20 1253 03/11/20 1253   98 4 °F (36 9 °C) 96 20 (!) 197/93 96 %      Temp src Heart Rate Source Patient Position - Orthostatic VS BP Location FiO2 (%)   -- 03/11/20 1253 03/11/20 1253 03/11/20 1253 --    Monitor Sitting Right arm       Pain Score       03/11/20 1317       Worst Possible Pain           Vitals:    03/11/20 1253 03/11/20 1356 03/11/20 1730   BP: (!) 197/93 (!) 194/87 (!) 199/88   Pulse: 96 62 77   Patient Position - Orthostatic VS: Sitting Lying Sitting         Visual Acuity      ED Medications  Medications   ketorolac (TORADOL) injection 15 mg (15 mg Intravenous Given 3/11/20 1317)       Diagnostic Studies  Results Reviewed     Procedure Component Value Units Date/Time    Basic metabolic panel [[id number]]  (Abnormal) Collected:  03/11/20 1311    Lab Status:  Final result Specimen:  Blood from Arm, Left Updated:  03/11/20 1326     Sodium 139 mmol/L      Potassium 3 8 mmol/L      Chloride 104 mmol/L      CO2 25 mmol/L      ANION GAP 10 mmol/L      BUN 19 mg/dL      Creatinine 1 03 mg/dL      Glucose 160 mg/dL      Calcium 8 9 mg/dL      eGFR 58 ml/min/1 73sq m     Narrative:       [place] guidelines for Chronic Kidney Disease (CKD):     Stage 1 with normal or high GFR (GFR > 90 mL/min/1 73 square meters)    Stage 2 Mild CKD (GFR = 60-89 mL/min/1 73 square meters)    Stage 3A Moderate CKD (GFR = 45-59 mL/min/1 73 square meters)    Stage 3B Moderate CKD (GFR = 30-44 mL/min/1 73 square meters)    Stage 4 Severe CKD (GFR = 15-29 mL/min/1 73 square meters)    Stage 5 End Stage CKD (GFR <15 mL/min/1 73 square meters)  Note: GFR calculation is accurate only with a steady state creatinine    CBC and differential [[id number]]  (Abnormal) Collected:  03/11/20 1311    Lab Status:  Final result Specimen:  Blood from Arm, Left Updated:  03/11/20 1315     WBC 8 11 Thousand/uL      RBC 4 42 Million/uL      Hemoglobin 13 5 g/dL      Hematocrit 43 2 %      MCV 98 fL      MCH 30 5 pg      MCHC 31 3 g/dL      RDW 12 6 %      MPV 10 5 fL      Platelets 023 Thousands/uL      nRBC 0 /100 WBCs      Neutrophils Relative 70 %      Immat GRANS % 1 %      Lymphocytes Relative 21 %      Monocytes Relative 6 %      Eosinophils Relative 2 %      Basophils Relative 0 %      Neutrophils Absolute 5 69 Thousands/µL      Immature Grans Absolute 0 04 Thousand/uL      Lymphocytes Absolute 1 70 Thousands/µL      Monocytes Absolute 0 51 Thousand/µL      Eosinophils Absolute 0 15 Thousand/µL Basophils Absolute 0 02 Thousands/µL                  XR spine lumbar 2 or 3 views injury   Final Result by Rigo Marcus MD (03/11 1729)      Stable mild acute compression fracture of L1 since 3 hours prior  Workstation performed: [id number]         CT chest abdomen pelvis wo contrast   Final Result by Doroteo Jerry MD (03/11 1449)         1  Acute appearing comminuted compression fracture of the L1 vertebral body with approximately 30% loss of height superiorly and mild osseous retropulsion of the posterior superior fragment as described above  No resultant spinal stenosis is    identified  2   No other acute abnormality identified within the chest, abdomen or pelvis  The study was marked in [facility] for immediate notification  Workstation performed: [id number]         CT head without contrast   Final Result by Doroteo Jerry MD (03/11 1434)      1  Scalp hematomas as described without definite acute intracranial abnormality appreciated  Workstation performed: [id number]         CT cervical spine without contrast   Final Result by Doroteo Jerry MD (03/11 1439)      No cervical spine fracture or traumatic malalignment  Workstation performed: [id number]         XR spine lumbar 2 or 3 views injury    (Results Pending)              Procedures  Procedures         ED Course  ED Course as of Mar 11 2046   Wed Mar 11, 2020   1452 L1 compression fracture, will d/w neurosurgery   CT chest abdomen pelvis wo contrast   1519 D/w Dr Jenni Lanes who agrees fitting for TLSO, upright lateral xrays in the brace today, and O/P follow up with repeat xray 2 days before followup                                      MDM      Disposition  Final diagnoses:   Compression fracture of L1 vertebra, initial encounter ([facility] [address] )   Concussion   Contusion of face, initial encounter     Time reflects when diagnosis was documented in both MDM as applicable and the Disposition within this note     Time User Action Codes Description Comment    3/11/2020  3:23 PM Elizabeth Ramos Add [L72 [id number]] Compression fracture of L1 vertebra, initial encounter ([address] )     3/11/2020  3:23 PM Elizabeth Ramos Add [[id number]] Concussion     3/11/2020  3:24 PM Bee Carrillo Dr Contusion of face, initial encounter       ED Disposition     ED Disposition Condition Date/Time Comment    Discharge Good Wed Mar 11, 2020  5:10 PM [facility] [address] discharge to home/self care              Follow-up Information     Follow up With Specialties Details Why Contact Info Additional Information    [address] [address] Neurosurgery Schedule an appointment as soon as possible for a visit in 1 week For followup [address] [facility] [phone]  [facility] [address] [place], [place], [facility] [address]    [address]  Call  for concussion followup information [phone]             Discharge Medication List as of 3/11/2020  5:27 PM      START taking these medications    Details   ibuprofen (MOTRIN) 600 mg tablet Take 1 tablet (600 mg total) by mouth every 8 (eight) hours as needed for mild pain or moderate pain, Starting Wed 3/11/2020, Print      methocarbamol (ROBAXIN) 750 mg tablet Take 1 tablet (750 mg total) by mouth 3 (three) times a day as needed for muscle spasms, Starting Wed 3/11/2020, Print         CONTINUE these medications which have NOT CHANGED    Details   albuterol (PROVENTIL HFA,VENTOLIN HFA) 90 mcg/act inhaler Inhale 2 puffs every 6 (six) hours as needed for wheezing, Starting Tue 1/7/2020, Normal      aspirin (ECOTRIN LOW STRENGTH) 81 mg EC tablet Take 1 tablet (81 mg total) by mouth daily, Starting Sun 8/25/2019, No Print      atorvastatin (LIPITOR) 40 mg tablet Take 1 tablet (40 mg total) by mouth daily with dinner, Starting Tue 1/7/2020, Normal      betamethasone dipropionate (DIPROSONE) 0 05 % cream Apply topically 2 (two) times a day, Starting Tue 1/7/2020, Normal      betamethasone, augmented, (DIPROLENE) 0 05 % ointment Apply topically 2 (two) times a day, Starting Tue 1/7/2020, Normal      cyclobenzaprine (FLEXERIL) 10 mg tablet Take 1 tablet (10 mg total) by mouth 3 (three) times a day as needed for muscle spasms for up to 10 days, Starting Thu 10/3/2019, Until Sun 10/13/2019, Print      furosemide (LASIX) 40 mg tablet Take 1 tablet (40 mg total) by mouth 2 (two) times a day, Starting Tue 1/7/2020, Normal      gabapentin (NEURONTIN) 400 mg capsule Take 1 capsule (400 mg total) by mouth 3 (three) times a day, Starting Tue 1/7/2020, Normal      hydrALAZINE (APRESOLINE) 100 MG tablet Take 1 tablet (100 mg total) by mouth 3 (three) times a day, Starting Tue 2/4/2020, Normal      levothyroxine 137 mcg tablet Take 1 tablet (137 mcg total) by mouth daily, Starting Tue 1/7/2020, Normal      methylPREDNISolone 4 MG tablet therapy pack Use as directed on package, Normal           Outpatient Discharge Orders   XR spine lumbar 2 or 3 views injury   Standing Status: Future Standing Exp   Date: 03/11/24       PDMP Review     None          ED Provider  Electronically Signed by           Meme Arriaga MD  03/11/20 2048

## 2020-03-11 NOTE — PHYSICAL THERAPY NOTE
Orthotic Note            Date: 3/11/2020      Patient Name: Leonel Rainey            Reason for Consult:  TLSO fitting & application      Assessment:    TLSO order received  Pt presented in ED w/ c/o inc back pain s/p fall x 1week ago  Imaging showed L2 compression fx  PT consulted for TLSO fitting & application  Pt fitted w/ TLSO at bedside while sitting at EOB  Good fitting achieved after multiple readjustments  Pt tolerating TLSO well  Pt reports inc support & dec pain w/ TLSO  Pt educated on spine precautions w/ good understanding  Pt instructed on TLSO application w/ good understanding & good return demo  Answered all questions satisfactorily  Pt offered no further questions & concerns from PT  TLSO fitting & application complete  RN notified       Douglas Howard, PT

## 2020-03-11 NOTE — DISCHARGE INSTRUCTIONS
For the Compression, you need to get a repeat Lumbar xray about 2 days prior to whenever you get your appointment scheduled with Neurosurgery    Concussion   WHAT YOU NEED TO KNOW:   A concussion is a mild brain injury  It is usually caused by a bump or blow to the head from a fall, a motor vehicle crash, or a sports injury  Sometimes being shaken forcefully may cause a concussion  DISCHARGE INSTRUCTIONS:   Have someone else call 911 for the following:   Someone tries to wake you and cannot do so  You have a seizure, increasing confusion, or a change in personality  Your speech becomes slurred, or you have new vision problems  Return to the emergency department if:   You have a severe headache that does not go away  You have arm or leg weakness, numbness, or new problems with coordination  You have blood or clear fluid coming out of the ears or nose  Contact your healthcare provider if:   You have nausea or are vomiting  You feel more sleepy than usual     Your symptoms get worse  Your symptoms last longer than 6 weeks after the injury  You have questions or concerns about your condition or care  Medicines:   Acetaminophen  helps to decrease pain  NSAIDs , such as ibuprofen, help decrease swelling and pain  NSAIDs can cause stomach bleeding or kidney problems in certain people  If you take blood thinner medicine, always ask your healthcare provider if NSAIDs are safe for you  Always read the medicine label and follow directions  Rest  from physical and mental activities as directed  Mental activities are those that require thinking, concentration, and attention  You will need to rest until your symptoms are gone  Rest will allow you to recover from your concussion  Ask your healthcare provider when you can return to work and other daily activities  Have someone stay with you for the first 24 hours after your injury    Your healthcare provider should be contacted if your symptoms get worse, or you develop new symptoms  Do not participate in sports and physical activities until your healthcare provider says it is okay  They could make your symptoms worse or lead to another concussion  Your healthcare provider will tell you when it is okay for you to return to sports or physical activities

## 2020-03-17 ENCOUNTER — APPOINTMENT (OUTPATIENT)
Dept: LAB | Facility: HOSPITAL | Age: 65
End: 2020-03-17
Attending: INTERNAL MEDICINE
Payer: COMMERCIAL

## 2020-03-17 ENCOUNTER — TRANSCRIBE ORDERS (OUTPATIENT)
Dept: ADMINISTRATIVE | Facility: HOSPITAL | Age: 65
End: 2020-03-17

## 2020-03-17 ENCOUNTER — HOSPITAL ENCOUNTER (OUTPATIENT)
Dept: RADIOLOGY | Facility: HOSPITAL | Age: 65
Discharge: HOME | End: 2020-03-17
Attending: EMERGENCY MEDICINE
Payer: COMMERCIAL

## 2020-03-17 ENCOUNTER — CONSULT (OUTPATIENT)
Dept: NEPHROLOGY | Facility: CLINIC | Age: 65
End: 2020-03-17
Payer: COMMERCIAL

## 2020-03-17 VITALS
SYSTOLIC BLOOD PRESSURE: 140 MMHG | WEIGHT: 187 LBS | HEART RATE: 70 BPM | BODY MASS INDEX: 27.7 KG/M2 | HEIGHT: 69 IN | DIASTOLIC BLOOD PRESSURE: 84 MMHG

## 2020-03-17 DIAGNOSIS — N28.9 RENAL INSUFFICIENCY: Primary | ICD-10-CM

## 2020-03-17 DIAGNOSIS — I10 ESSENTIAL HYPERTENSION: ICD-10-CM

## 2020-03-17 DIAGNOSIS — S32.010A COMPRESSION FRACTURE OF L1 VERTEBRA, INITIAL ENCOUNTER (HCC): ICD-10-CM

## 2020-03-17 DIAGNOSIS — R80.8 OTHER PROTEINURIA: ICD-10-CM

## 2020-03-17 DIAGNOSIS — N18.30 CHRONIC KIDNEY DISEASE, STAGE 3 (HCC): ICD-10-CM

## 2020-03-17 PROBLEM — E11.22 TYPE 2 DIABETES MELLITUS WITH STAGE 3 CHRONIC KIDNEY DISEASE, WITHOUT LONG-TERM CURRENT USE OF INSULIN (HCC): Status: RESOLVED | Noted: 2018-02-06 | Resolved: 2020-03-17

## 2020-03-17 LAB
CREAT UR-MCNC: 216 MG/DL
PROT UR-MCNC: 49 MG/DL
PROT/CREAT UR: 0.23 MG/G{CREAT} (ref 0–0.1)

## 2020-03-17 PROCEDURE — 3060F POS MICROALBUMINURIA REV: CPT | Performed by: FAMILY MEDICINE

## 2020-03-17 PROCEDURE — 99244 OFF/OP CNSLTJ NEW/EST MOD 40: CPT | Performed by: INTERNAL MEDICINE

## 2020-03-17 PROCEDURE — 3079F DIAST BP 80-89 MM HG: CPT | Performed by: INTERNAL MEDICINE

## 2020-03-17 PROCEDURE — 84156 ASSAY OF PROTEIN URINE: CPT | Performed by: INTERNAL MEDICINE

## 2020-03-17 PROCEDURE — 3008F BODY MASS INDEX DOCD: CPT | Performed by: INTERNAL MEDICINE

## 2020-03-17 PROCEDURE — 3077F SYST BP >= 140 MM HG: CPT | Performed by: INTERNAL MEDICINE

## 2020-03-17 PROCEDURE — 82570 ASSAY OF URINE CREATININE: CPT | Performed by: INTERNAL MEDICINE

## 2020-03-17 PROCEDURE — 3044F HG A1C LEVEL LT 7.0%: CPT | Performed by: INTERNAL MEDICINE

## 2020-03-17 PROCEDURE — 72100 X-RAY EXAM L-S SPINE 2/3 VWS: CPT

## 2020-03-17 PROCEDURE — 3066F NEPHROPATHY DOC TX: CPT | Performed by: INTERNAL MEDICINE

## 2020-03-17 NOTE — PROGRESS NOTES
Consultation - Nephrology   Omar Riojas 60 y o  female MRN: [id number]  Unit/Bed#:  Encounter: [id number]      Assessment/Plan     Assessment / Plan:  1  Proteinuria    The patient was seen by 1 of my partners about 4 years ago  She then was not being followed but recently has been referred back to check on her kidney function  The patient has a creatinine of 1 which is stable at her baseline and urine microalbumin screen from July was positive as it was measured at 62  The patient tells me that she was on metformin in the past but has been off of it as she lost weight her blood sugars have been good so it does not really sound like she has been a confirmed diabetic  I do see that she had a urinalysis done in October of 2019 that had 2+ proteinuria  The question is whether not the patient has had increasing proteinuria  Since the last estimated quantitation was prior to this I am just going to have her do urine protein to creatinine ratio  If the protein estimation is significant we will need to pursue a potential diagnosis if it is insignificant it can just be continue to be monitored periodically  She does say that she took a lot of ibuprofen in the past but now only uses it very occasionally  Check urine protein creatinine ratio  Determine if this is significantly elevated which will require further evaluation or initiation of an ARB or ACE-inhibitor  No changes for now  History of Present Illness   Physician Requesting Consult: No att  providers found  Reason for Consult / Principal Problem:  Proteinuria  Hx and PE limited by:   HPI: Omar Riojas is a 60y o  year old female who presents for her 1st visit with me  She had been seen by 1 of my partners back in 2016 and then was not followed since then  She recently developed a compression fracture and on seeing her physician she has been referred back to evaluate and monitor her kidney function    History obtained from chart review and the patient  Consults    Review of Systems   Constitutional: Negative for appetite change, chills, fatigue and fever  HENT: Negative  Eyes: Negative  Respiratory: Negative  Negative for cough, choking, chest tightness, shortness of breath and wheezing  Cardiovascular: Negative  Negative for chest pain, palpitations and leg swelling  Gastrointestinal: Negative  Negative for abdominal distention, abdominal pain, diarrhea, nausea and vomiting  Genitourinary: Negative  Negative for difficulty urinating, dysuria, flank pain and hematuria  Musculoskeletal: Positive for back pain  Wearing back brace  Skin: Negative  Neurological: Negative  Psychiatric/Behavioral: Negative  Historical Information   Patient Active Problem List   Diagnosis    Mixed hyperlipidemia    Essential hypertension    Chronic gout 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 tophus    Acquired hypothyroidism    Nummular eczema    Overweight (BMI 25 0-29  9)    Screening for breast cancer    Tobacco abuse    Right-sided lacunar infarction ([facility] [address] )    History of CVA (cerebrovascular accident)    Left ovarian cyst    Medial epicondylitis of elbow, left    Other proteinuria     Past Medical History:   Diagnosis Date    Acute renal failure (ARF) (HCC)     Anemia     Asthma     Atopic dermatitis     Chronic kidney disease     Stage 3    Colon polyps     Diabetes mellitus (HCC)     type 2    Disease of thyroid gland     hypothyroidism    Diverticulosis     Edema of both legs     Gout     H/O colonoscopy     H/O mammogram     Hemorrhoids     Hyperlipidemia     Hypertension     Nephrosclerosis     Pain in unspecified foot     Peripheral neuropathy     Phlebitis     Venous stasis dermatitis of both lower extremities     Wrist joint pain     LEFT    Told she was only prediabetic in the past   Past Surgical History:   Procedure Laterality Date    CERVIX SURGERY      COLONOSCOPY N/A 5/31/2016    Procedure: COLONOSCOPY;  Surgeon: Ermelinda Gallegos MD;  Location: [facility]; Service:     VEIN SURGERY       Social History   Social History     Substance and Sexual Activity   Alcohol Use Yes    Comment: social     Social History     Substance and Sexual Activity   Drug Use No     Social History     Tobacco Use   Smoking Status Current Every Day Smoker    Packs/day: 0 50    Years: 15 00    Pack years: 7 50    Types: Cigarettes   Smokeless Tobacco Never Used   Tobacco Comment    30     Family History   Problem Relation Age of Onset    Diabetes Mother         Type 2 as per allscripts    Heart attack Father     Diabetes type II Sister     Hypertension Other        Meds/Allergies   current meds:   No current facility-administered medications for this visit  Allergies   Allergen Reactions    Neosporin [Neomycin-Bacitracin Zn-Polymyx]     Niacin And Related     Shellfish-Derived Products      seafood       Objective   [unfilled]  Body mass index is 27 62 kg/m²  Invasive Devices:        PHYSICAL EXAM:  /84 (BP Location: Right arm, Patient Position: Sitting, Cuff Size: Standard)   Pulse 70   Ht 5' 9" (1 753 m)   Wt 84 8 kg (187 lb)   BMI 27 62 kg/m²     Physical Exam   Constitutional: She is oriented to person, place, and time  She appears well-nourished  No distress  HENT:   Head: Atraumatic  Mouth/Throat: Oropharynx is clear and moist    Eyes: Conjunctivae and EOM are normal  No scleral icterus  Neck: Neck supple  No JVD present  Cardiovascular: Normal rate and regular rhythm  Exam reveals no gallop and no friction rub  No edema  Pulmonary/Chest: Effort normal and breath sounds normal  No respiratory distress  She has no wheezes  She has no rales  Abdominal: Soft  Bowel sounds are normal  She exhibits no distension  There is no tenderness  There is no rebound  Neurological: She is alert and oriented to person, place, and time     Gait normal nonfocal motor exam    Skin: She is not diaphoretic  Psychiatric: She has a normal mood and affect           Current Weight: Weight - Scale: 84 8 kg (187 lb)  First Weight: Weight - Scale: 84 8 kg (187 lb)    Lab Results:    Results from last 7 days   Lab Units 03/11/20  1311   WBC Thousand/uL 8 11   HEMOGLOBIN g/dL 13 5   HEMATOCRIT % 43 2   PLATELETS Thousands/uL 214     Results from last 7 days   Lab Units 03/11/20  1311   POTASSIUM mmol/L 3 8   CHLORIDE mmol/L 104   CO2 mmol/L 25   BUN mg/dL 19   CREATININE mg/dL 1 03   CALCIUM mg/dL 8 9     Results from last 7 days   Lab Units 03/11/20  1311   POTASSIUM mmol/L 3 8   CHLORIDE mmol/L 104   CO2 mmol/L 25   BUN mg/dL 19   CREATININE mg/dL 1 03   CALCIUM mg/dL 8 9

## 2020-03-17 NOTE — LETTER
March 17, 2020     Soy Orosco, DO [address]  [address] [facility]  [address] [employer]    Patient: Jose Centeno   YOB: 1955   Date of Visit: 3/17/2020       Dear Dr Rafa Lopez:    Thank you for referring Annika Garcia to me for evaluation  Below are my notes for this consultation  If you have questions, please do not hesitate to call me  I look forward to following your patient along with you  Sincerely,        Victoriano Ellis MD        CC: No Recipients  Victoriano Ellis MD  3/17/2020 12:38 PM  Sign at close encounter  Consultation - Nephrology   Jose Centeno 60 y o  female MRN: [id number]  Unit/Bed#:  Encounter: [id number]      Assessment/Plan     Assessment / Plan:  1  Proteinuria    The patient was seen by 1 of my partners about 4 years ago  She then was not being followed but recently has been referred back to check on her kidney function  The patient has a creatinine of 1 which is stable at her baseline and urine microalbumin screen from July was positive as it was measured at 62  The patient tells me that she was on metformin in the past but has been off of it as she lost weight her blood sugars have been good so it does not really sound like she has been a confirmed diabetic  I do see that she had a urinalysis done in October of 2019 that had 2+ proteinuria  The question is whether not the patient has had increasing proteinuria  Since the last estimated quantitation was prior to this I am just going to have her do urine protein to creatinine ratio  If the protein estimation is significant we will need to pursue a potential diagnosis if it is insignificant it can just be continue to be monitored periodically  She does say that she took a lot of ibuprofen in the past but now only uses it very occasionally      Check urine protein creatinine ratio  Determine if this is significantly elevated which will require further evaluation or initiation of an ARB or ACE-inhibitor  No changes for now  History of Present Illness   Physician Requesting Consult: No att  providers found  Reason for Consult / Principal Problem:  Proteinuria  Hx and PE limited by:   HPI: Ervin Mccrary is a 60y o  year old female who presents for her 1st visit with me  She had been seen by 1 of my partners back in 2016 and then was not followed since then  She recently developed a compression fracture and on seeing her physician she has been referred back to evaluate and monitor her kidney function  History obtained from chart review and the patient  Consults    Review of Systems   Constitutional: Negative for appetite change, chills, fatigue and fever  HENT: Negative  Eyes: Negative  Respiratory: Negative  Negative for cough, choking, chest tightness, shortness of breath and wheezing  Cardiovascular: Negative  Negative for chest pain, palpitations and leg swelling  Gastrointestinal: Negative  Negative for abdominal distention, abdominal pain, diarrhea, nausea and vomiting  Genitourinary: Negative  Negative for difficulty urinating, dysuria, flank pain and hematuria  Musculoskeletal: Positive for back pain  Wearing back brace  Skin: Negative  Neurological: Negative  Psychiatric/Behavioral: Negative  Historical Information   Patient Active Problem List   Diagnosis    Mixed hyperlipidemia    Essential hypertension    Chronic gout 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 tophus    Acquired hypothyroidism    Nummular eczema    Overweight (BMI 25 0-29  9)    Screening for breast cancer    Tobacco abuse    Right-sided lacunar infarction ([facility] [address]    History of CVA (cerebrovascular accident)    Left ovarian cyst    Medial epicondylitis of elbow, left    Other proteinuria     Past Medical History:   Diagnosis Date    Acute renal failure (ARF) (HCC)     Anemia     Asthma     Atopic dermatitis     Chronic kidney disease     Stage 3    Colon polyps     Diabetes mellitus ([facility] [address] )     type 2    Disease of thyroid gland     hypothyroidism    Diverticulosis     Edema of both legs     Gout     H/O colonoscopy     H/O mammogram     Hemorrhoids     Hyperlipidemia     Hypertension     Nephrosclerosis     Pain in unspecified foot     Peripheral neuropathy     Phlebitis     Venous stasis dermatitis of both lower extremities     Wrist joint pain     LEFT    Told she was only prediabetic in the past   Past Surgical History:   Procedure Laterality Date    CERVIX SURGERY      COLONOSCOPY N/A 5/31/2016    Procedure: COLONOSCOPY;  Surgeon: Ramses Valladares MD;  Location: [facility]; Service:     VEIN SURGERY       Social History   Social History     Substance and Sexual Activity   Alcohol Use Yes    Comment: social     Social History     Substance and Sexual Activity   Drug Use No     Social History     Tobacco Use   Smoking Status Current Every Day Smoker    Packs/day: 0 50    Years: 15 00    Pack years: 7 50    Types: Cigarettes   Smokeless Tobacco Never Used   Tobacco Comment    30     Family History   Problem Relation Age of Onset    Diabetes Mother         Type 2 as per allscripts    Heart attack Father     Diabetes type II Sister     Hypertension Other        Meds/Allergies   current meds:   No current facility-administered medications for this visit  Allergies   Allergen Reactions    Neosporin [Neomycin-Bacitracin Zn-Polymyx]     Niacin And Related     Shellfish-Derived Products      seafood       Objective   [unfilled]  Body mass index is 27 62 kg/m²  Invasive Devices:        PHYSICAL EXAM:  /84 (BP Location: Right arm, Patient Position: Sitting, Cuff Size: Standard)   Pulse 70   Ht 5' 9" (1 753 m)   Wt 84 8 kg (187 lb)   BMI 27 62 kg/m²      Physical Exam   Constitutional: She is oriented to person, place, and time  She appears well-nourished  No distress  HENT:   Head: Atraumatic     Mouth/Throat: Oropharynx is clear and moist    Eyes: Conjunctivae and EOM are normal  No scleral icterus  Neck: Neck supple  No JVD present  Cardiovascular: Normal rate and regular rhythm  Exam reveals no gallop and no friction rub  No edema  Pulmonary/Chest: Effort normal and breath sounds normal  No respiratory distress  She has no wheezes  She has no rales  Abdominal: Soft  Bowel sounds are normal  She exhibits no distension  There is no tenderness  There is no rebound  Neurological: She is alert and oriented to person, place, and time  Gait normal nonfocal motor exam    Skin: She is not diaphoretic  Psychiatric: She has a normal mood and affect           Current Weight: Weight - Scale: 84 8 kg (187 lb)  First Weight: Weight - Scale: 84 8 kg (187 lb)    Lab Results:    Results from last 7 days   Lab Units 03/11/20  1311   WBC Thousand/uL 8 11   HEMOGLOBIN g/dL 13 5   HEMATOCRIT % 43 2   PLATELETS Thousands/uL 214     Results from last 7 days   Lab Units 03/11/20  1311   POTASSIUM mmol/L 3 8   CHLORIDE mmol/L 104   CO2 mmol/L 25   BUN mg/dL 19   CREATININE mg/dL 1 03   CALCIUM mg/dL 8 9     Results from last 7 days   Lab Units 03/11/20  1311   POTASSIUM mmol/L 3 8   CHLORIDE mmol/L 104   CO2 mmol/L 25   BUN mg/dL 19   CREATININE mg/dL 1 03   CALCIUM mg/dL 8 9

## 2020-03-17 NOTE — PATIENT INSTRUCTIONS
You are here to evaluate your kidney function  As you informed me you had seen 1 of my partners and I looked at the record it was back in 2016  You been referred back by her family physician to just check on her kidney function  I explained to that your creatinine which is the blood test for the kidney is 1 which is normal   We then discussed that there was a calculation called GFR that takes into account the age of a patient with a given blood test and that is how they categorized people in 2 stages  In my opinion I do not see that you have kidney disease she might have a little aging or nephrosclerosis  The only thing I want to check is the amount of protein in the urine cause in the past there was a little bit there then there was 1 test that they showed they detected on the dipstick evaluation so for now I just want make sure there is no significant amount of protein that would require further evaluation  Obtain urine protein test that I have provided to and I will call you with the results  Continue current medications with no changes

## 2020-03-17 NOTE — ED CARE HANDOFF
3/17/20: 1334    Patient was called in regards to xray finding  There was no answer   Left message for patient to call NERIS Moncada PA-C  03/17/20 [id number]

## 2020-03-18 ENCOUNTER — CONSULT (OUTPATIENT)
Dept: NEUROSURGERY | Facility: CLINIC | Age: 65
End: 2020-03-18
Payer: COMMERCIAL

## 2020-03-18 VITALS
TEMPERATURE: 97.8 F | BODY MASS INDEX: 27.4 KG/M2 | WEIGHT: 185 LBS | DIASTOLIC BLOOD PRESSURE: 90 MMHG | HEIGHT: 69 IN | SYSTOLIC BLOOD PRESSURE: 148 MMHG | HEART RATE: 83 BPM

## 2020-03-18 DIAGNOSIS — S32.010A COMPRESSION FRACTURE OF L1 VERTEBRA, INITIAL ENCOUNTER (HCC): ICD-10-CM

## 2020-03-18 PROCEDURE — 3077F SYST BP >= 140 MM HG: CPT | Performed by: PHYSICIAN ASSISTANT

## 2020-03-18 PROCEDURE — 3080F DIAST BP >= 90 MM HG: CPT | Performed by: PHYSICIAN ASSISTANT

## 2020-03-18 PROCEDURE — 3044F HG A1C LEVEL LT 7.0%: CPT | Performed by: PHYSICIAN ASSISTANT

## 2020-03-18 PROCEDURE — 99204 OFFICE O/P NEW MOD 45 MIN: CPT | Performed by: PHYSICIAN ASSISTANT

## 2020-03-18 PROCEDURE — 3066F NEPHROPATHY DOC TX: CPT | Performed by: PHYSICIAN ASSISTANT

## 2020-03-18 NOTE — PROGRESS NOTES
Neurosurgery Office Note  Toro Villaseñor 60 y o  female MRN: [id number]      Assessment/Plan     This is a 60-year-old female who sustained a fall down stairs two weeks ago after feeling dizzy  She presented to the hospital one week following a fall with complaints of back pain and was diagnosed with an L1 compression fracture  She was provided a TLSO brace and follows up in the office today with repeat x-rays  Patient's pain is controlled with ibuprofen and Robaxin  She denies any radicular complaints  Upright x-rays show stable alignment without any significant change in loss of height  Imaging reviewed with patient  Reviewed activity limitations including no driving, no heavy lifting , bending or twisting  Patient will follow-up in four weeks with repeat upright x-rays  All questions were answered  Patient advised to call her PCP of any ongoing dizziness or feeling unwell  Denies any fevers, cough or shortness of breath  Patient is a  and has been out of work since [facility] 21 visit  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  Will need to be re-evaluated prior to returning to work  Diagnoses and all orders for this visit:    Compression fracture of L1 vertebra, initial encounter [facility])  -     Ambulatory referral to Neurosurgery  -     XR spine lumbar 2 or 3 views injury; Future            CHIEF COMPLAINT    Chief Complaint   Patient presents with    Consult     L1 compression fracture        HISTORY    History of Present Illness     60 y o   female with past medical history significant for hypertension, diabetes, stage 3 chronic kidney disease and 'mini-stroke' in August who presents for evaluation of an L1 traumatic fracture  Patient states two weeks ago she was feeling unwell and became dizzy at the top of her steps falling backwards  Denies loss of consciousness    She states recent increase in her hypertensive medications and felt this may have been the etiology  Denies any further dizziness  Patient has facial ecchymosis and developed back pain which was progressive  She presented to the emergency room approximately one week following her injury and was diagnosed with an L1 compression fracture  Imaging demonstrated no involvement of posterior elements  She was treated with an TLSO  Patient states she has been wearing the brace when out of bed  Back pain rated approximately 6/10 in her low to mid back  Pain improves with ibuprofen and Robaxin  She denies any radicular pain, numbness, tingling and or weakness of her extremities  Denies any gait disturbance  Denies any bowel or bladder dysfunction  REVIEW OF SYSTEMS    Review of Systems   Constitutional: Positive for activity change  Negative for fatigue and fever  HENT: Negative for trouble swallowing and voice change  Eyes: Negative for photophobia and visual disturbance  Respiratory: Negative for cough and shortness of breath  Cardiovascular: Negative for chest pain and leg swelling  Gastrointestinal: Negative for abdominal pain, nausea and vomiting  Genitourinary: Negative for decreased urine volume and difficulty urinating  Musculoskeletal: Positive for back pain  Negative for gait problem and neck pain  Skin: Negative for pallor, rash and wound  Neurological: Negative for dizziness, tremors, seizures, speech difficulty, weakness, light-headedness, numbness and headaches  Psychiatric/Behavioral: Negative for agitation and confusion           Meds/Allergies     Current Outpatient Medications   Medication Sig Dispense Refill    albuterol (PROVENTIL HFA,VENTOLIN HFA) 90 mcg/act inhaler Inhale 2 puffs every 6 (six) hours as needed for wheezing 18 each 5    aspirin (ECOTRIN LOW STRENGTH) 81 mg EC tablet Take 1 tablet (81 mg total) by mouth daily  0    atorvastatin (LIPITOR) 40 mg tablet Take 1 tablet (40 mg total) by mouth daily with dinner 90 tablet 1    furosemide (LASIX) 40 mg tablet Take 1 tablet (40 mg total) by mouth 2 (two) times a day (Patient taking differently: Take 40 mg by mouth daily ) 90 tablet 0    gabapentin (NEURONTIN) 400 mg capsule Take 1 capsule (400 mg total) by mouth 3 (three) times a day 270 capsule 1    hydrALAZINE (APRESOLINE) 100 MG tablet Take 1 tablet (100 mg total) by mouth 3 (three) times a day 90 tablet 1    ibuprofen (MOTRIN) 600 mg tablet Take 1 tablet (600 mg total) by mouth every 8 (eight) hours as needed for mild pain or moderate pain 30 tablet 0    levothyroxine 137 mcg tablet Take 1 tablet (137 mcg total) by mouth daily 90 tablet 0    methocarbamol (ROBAXIN) 750 mg tablet Take 1 tablet (750 mg total) by mouth 3 (three) times a day as needed for muscle spasms 42 tablet 0     No current facility-administered medications for this visit  Allergies   Allergen Reactions    Neosporin [Neomycin-Bacitracin Zn-Polymyx]     Niacin And Related     Shellfish-Derived Products      seafood       PAST HISTORY    Past Medical History:   Diagnosis Date    Acute renal failure (ARF) (HCC)     Anemia     Asthma     Atopic dermatitis     Chronic kidney disease     Stage 3    Colon polyps     Diabetes mellitus (HCC)     type 2    Disease of thyroid gland     hypothyroidism    Diverticulosis     Edema of both legs     Gout     H/O colonoscopy     H/O mammogram     Hemorrhoids     Hyperlipidemia     Hypertension     Nephrosclerosis     Pain in unspecified foot     Peripheral neuropathy     Phlebitis     Venous stasis dermatitis of both lower extremities     Wrist joint pain     LEFT       Past Surgical History:   Procedure Laterality Date    CERVIX SURGERY      COLONOSCOPY N/A 5/31/2016    Procedure: COLONOSCOPY;  Surgeon: Tresa Martínez MD;  Location: [facility];   Service:     VEIN SURGERY         Social History     Tobacco Use    Smoking status: Current Every Day Smoker     Packs/day: 0 50     Years: 15 00     Pack years: 7 50 Types: Cigarettes    Smokeless tobacco: Never Used    Tobacco comment: 30   Substance Use Topics    Alcohol use: Yes     Comment: social    Drug use: No       Family History   Problem Relation Age of Onset    Diabetes Mother         Type 2 as per allscripts    Heart attack Father     Diabetes type II Sister     Hypertension Other          Above history personally reviewed  EXAM    Vitals:Blood pressure 148/90, pulse 83, temperature 97 8 °F (36 6 °C), temperature source Temporal, height 5' 9" (1 753 m), weight 83 9 kg (185 lb)  ,Body mass index is 27 32 kg/m²  Physical Exam   Constitutional: She is oriented to person, place, and time  She appears well-developed and well-nourished  No distress  HENT:   Head: Normocephalic  Healing ecchymosis under left eye   Eyes: Conjunctivae and EOM are normal  Right eye exhibits no discharge  Left eye exhibits no discharge  No scleral icterus  Neck: Normal range of motion  Neck supple  Cardiovascular: Normal rate  Pulmonary/Chest: Effort normal    Abdominal: Soft  She exhibits no distension  Musculoskeletal: She exhibits tenderness (Mild-to-moderate at L1 level)  Neurological: She is oriented to person, place, and time  She has normal strength  Gait normal    Reflex Scores:       Bicep reflexes are 2+ on the right side and 3+ on the left side  Brachioradialis reflexes are 2+ on the right side and 3+ on the left side  Patellar reflexes are 2+ on the right side and 0 on the left side  Achilles reflexes are 1+ on the right side and 1+ on the left side  Skin: Skin is warm and dry  No rash noted  Psychiatric: She has a normal mood and affect  Her speech is normal and behavior is normal  Judgment and thought content normal        Neurologic Exam     Mental Status   Oriented to person, place, and time  Follows 3 step commands     Attention: normal  Concentration: normal    Speech: speech is normal   Level of consciousness: alert  Knowledge: good  Normal comprehension  Cranial Nerves     CN III, IV, VI   Extraocular motions are normal    Right pupil: Size: 3 mm  Shape: regular  Left pupil: Size: 3 mm  Shape: regular  Nystagmus: none   Upgaze: normal  Conjugate gaze: present    CN VII   Facial expression full, symmetric  CN VIII   Hearing: intact    CN XI   Right trapezius strength: normal  Left trapezius strength: normal    CN XII   Tongue: not atrophic  Fasciculations: absent  Tongue deviation: none    Motor Exam   Muscle bulk: normal  Overall muscle tone: normal    Strength   Strength 5/5 throughout  Sensory Exam   Light touch normal      Gait, Coordination, and Reflexes     Gait  Gait: normal    Tremor   Resting tremor: absent  Intention tremor: absent  Action tremor: absent    Reflexes   Right brachioradialis: 2+  Left brachioradialis: 3+  Right biceps: 2+  Left biceps: 3+  Right patellar: 2+  Left patellar: 0  Right achilles: 1+  Left achilles: 1+  Right Rm: absent  Left Rm: absent  Right ankle clonus: absent  Left ankle clonus: absent        MEDICAL DECISION MAKING    Imaging Studies:     Ct Chest Abdomen Pelvis Wo Contrast    Result Date: 3/11/2020  Narrative: CT CHEST, ABDOMEN AND PELVIS WITHOUT IV CONTRAST INDICATION:   Chest-abdomen-pelvis trauma, blunt  COMPARISON:  None  TECHNIQUE: CT examination of the chest, abdomen and pelvis was performed without intravenous contrast   Axial, sagittal, and coronal 2D reformatted images were created from the source data and submitted for interpretation  Radiation dose length product (DLP) for this visit:  631 mGy-cm   This examination, like all CT scans performed in the [facility], was performed utilizing techniques to minimize radiation dose exposure, including the use of iterative reconstruction and automated exposure control  Enteric contrast was not administered   FINDINGS: CHEST LUNGS:  Linear scar or atelectasis in the lung bases is present  3 mm calcified granuloma in the right upper lobe is present  Lungs are otherwise clear  Central airways are patent  PLEURA:  Unremarkable  HEART/GREAT VESSELS:  The heart is normal in size  Mild ectasia descending aorta is present without aneurysmal dilatation  Great vessels otherwise unremarkable on this unenhanced exam  MEDIASTINUM AND RITA:  Unremarkable  CHEST WALL AND LOWER NECK:   Unremarkable  ABDOMEN LIVER/BILIARY TREE:  Unremarkable  GALLBLADDER:  No calcified gallstones  No pericholecystic inflammatory change  SPLEEN:  Unremarkable  PANCREAS:  Unremarkable  ADRENAL GLANDS:  Unremarkable  KIDNEYS/URETERS:  Unremarkable  No hydronephrosis  STOMACH AND BOWEL:  Unremarkable  APPENDIX:  No findings to suggest appendicitis  ABDOMINOPELVIC CAVITY:  No ascites or free intraperitoneal air  No lymphadenopathy  VESSELS:  Circumaortic left renal vein incidentally noted  Abdominal aorta and inferior vena cava are normal in course and caliber  PELVIS REPRODUCTIVE ORGANS:  Unremarkable for patient's age  URINARY BLADDER:  Unremarkable  ABDOMINAL WALL/INGUINAL REGIONS:  Unremarkable  OSSEOUS STRUCTURES:  There is an approximately 30% compression fracture involving the superior endplate of L1 with comminuted fracture of the body with approximately 3 mm posterior displacement of the posterior superior aspect of the L1 vertebral body into the ventral spinal canal without belinda spinal stenosis which appears to be a new finding when compared study of 10/3/2019  No other fractures identified  Some degenerative change throughout the spine is noted most notably at L5-S1 which is stable  Impression: 1  Acute appearing comminuted compression fracture of the L1 vertebral body with approximately 30% loss of height superiorly and mild osseous retropulsion of the posterior superior fragment as described above  No resultant spinal stenosis is identified   2   No other acute abnormality identified within the chest, abdomen or pelvis  The study was marked in [facility] for immediate notification  Workstation performed: [id number]     Xr Spine Lumbar 2 Or 3 Views Injury    Result Date: 3/17/2020  Narrative: LUMBAR SPINE INDICATION:   S32 010A: Wedge compression fracture of first lumbar vertebra, initial encounter for closed fracture  COMPARISON:  3/11/2020 VIEWS:  XR SPINE LUMBAR 2 OR 3 VIEWS INJURY FINDINGS: There are 5 non rib bearing lumbar vertebral bodies  Previous noted L1 compression fracture now demonstrates increased anterior superior wedging and loss of stature  External brace artifact  Alignment is unchanged  L5-S1 severe degenerative disc change and posterior facet degenerative sclerosis again noted  The pedicles appear intact  Soft tissues are unremarkable  Impression: Increased L1 compression fracture  The study was marked in EPIC for significant notification  Workstation performed: [id number]     Xr Spine Lumbar 2 Or 3 Views Injury    Result Date: 3/11/2020  Narrative: LUMBAR SPINE INDICATION:   L1 compression in TLSO  COMPARISON:  CT abdomen and pelvis 3/11/2020 and CT lumbar spine 10/3/2019 VIEWS:  XR SPINE LUMBAR 2 OR 3 VIEWS INJURY FINDINGS: There are 5 non rib bearing lumbar vertebral bodies  Acute mild compression fracture of L1, stable  Alignment is unremarkable  Severe degenerative disc disease at L5-S1  Minimal facet degenerative disease at L5-S1 and to a lesser extent L4-5  The pedicles appear intact  There are atherosclerotic calcifications  Soft tissues are otherwise unremarkable  Impression: Stable mild acute compression fracture of L1 since 3 hours prior  Workstation performed: [id number]     Ct Head Without Contrast    Result Date: 3/11/2020  Narrative: CT BRAIN - WITHOUT CONTRAST INDICATION:   Head trauma, minor, normal mental status (Age 24-59y)  COMPARISON:  CT 8/23/2019 TECHNIQUE:  CT examination of the brain was performed    In addition to axial images, coronal 2D reformatted images were created and submitted for interpretation  Radiation dose length product (DLP) for this visit:  808 mGy-cm   This examination, like all CT scans performed in the [facility], was performed utilizing techniques to minimize radiation dose exposure, including the use of iterative reconstruction and automated exposure control  IMAGE QUALITY:  Diagnostic  FINDINGS: PARENCHYMA:  No intracranial mass, mass effect or midline shift  No CT signs of acute infarction  No acute parenchymal hemorrhage  VENTRICLES AND EXTRA-AXIAL SPACES:  Normal for the patient's age  VISUALIZED ORBITS AND PARANASAL SINUSES:  Unremarkable  CALVARIUM AND EXTRACRANIAL SOFT TISSUES:  Right high posterior parietal and anterior left frontal/supraorbital scalp hematomas are present without evidence for underlying fracture  Impression: 1  Scalp hematomas as described without definite acute intracranial abnormality appreciated  Workstation performed: [id number]     Ct Cervical Spine Without Contrast    Result Date: 3/11/2020  Narrative: CT CERVICAL SPINE - WITHOUT CONTRAST INDICATION:   Neck pain, recent trauma  COMPARISON:  None  TECHNIQUE:  CT examination of the cervical spine was performed without intravenous contrast   Contiguous axial images were obtained  Sagittal and coronal reconstructions were performed  Radiation dose length product (DLP) for this visit:  520 mGy-cm   This examination, like all CT scans performed in the [facility], was performed utilizing techniques to minimize radiation dose exposure, including the use of iterative reconstruction and automated exposure control  IMAGE QUALITY:  Diagnostic  FINDINGS: ALIGNMENT:  There is straightening of normal cervical lordosis  No subluxation or compression deformity  VERTEBRAL BODIES:  No fracture  DEGENERATIVE CHANGES:  Mild multilevel cervical degenerative changes are noted without critical central canal stenosis   PREVERTEBRAL AND PARASPINAL SOFT TISSUES:  Unremarkable  THORACIC INLET:  Normal      Impression: No cervical spine fracture or traumatic malalignment  Workstation performed: [id number]       I have personally reviewed pertinent reports     and I have personally reviewed pertinent films in PACS

## 2020-03-18 NOTE — PATIENT INSTRUCTIONS
Neurosurgery discharge instructions following spine fracture:      TLSO brace to be worn when out of bed  May place brace on while sitting on edge of bed  May be removed for showering   No bending, twisting or heavy lifting  No strenuous activities  No Driving   Follow-up as scheduled in four weeks with repeat spine x-rays to be completed 2-3 days prior to visit  Prescription has been entered electronically  **Please notify MD immediately if you have increased back or leg pain  New numbness, tingling and/or weakness in your legs  Bowel/bladder dysfunction   **

## 2020-04-08 DIAGNOSIS — I10 ESSENTIAL HYPERTENSION: ICD-10-CM

## 2020-04-08 RX ORDER — HYDRALAZINE HYDROCHLORIDE 100 MG/1
100 TABLET, FILM COATED ORAL 3 TIMES DAILY
Qty: 90 TABLET | Refills: 1 | Status: SHIPPED | OUTPATIENT
Start: 2020-04-08 | End: 2020-06-22 | Stop reason: SDUPTHER

## 2020-04-09 ENCOUNTER — TELEPHONE (OUTPATIENT)
Dept: NEUROLOGY | Facility: CLINIC | Age: 65
End: 2020-04-09

## 2020-04-16 ENCOUNTER — HOSPITAL ENCOUNTER (OUTPATIENT)
Dept: RADIOLOGY | Facility: HOSPITAL | Age: 65
Discharge: HOME | End: 2020-04-16
Payer: COMMERCIAL

## 2020-04-16 DIAGNOSIS — S32.010A COMPRESSION FRACTURE OF L1 VERTEBRA, INITIAL ENCOUNTER (HCC): ICD-10-CM

## 2020-04-16 PROCEDURE — 72100 X-RAY EXAM L-S SPINE 2/3 VWS: CPT

## 2020-04-22 ENCOUNTER — TELEMEDICINE (OUTPATIENT)
Dept: NEUROSURGERY | Facility: CLINIC | Age: 65
End: 2020-04-22
Payer: COMMERCIAL

## 2020-04-22 DIAGNOSIS — S00.83XA CONTUSION OF FACE, INITIAL ENCOUNTER: ICD-10-CM

## 2020-04-22 DIAGNOSIS — S06.0X9A CONCUSSION: ICD-10-CM

## 2020-04-22 DIAGNOSIS — I10 ESSENTIAL HYPERTENSION: ICD-10-CM

## 2020-04-22 DIAGNOSIS — S32.010S COMPRESSION FRACTURE OF L1 VERTEBRA, SEQUELA: ICD-10-CM

## 2020-04-22 PROBLEM — S32.010A COMPRESSION FRACTURE OF L1 LUMBAR VERTEBRA (HCC): Status: ACTIVE | Noted: 2020-04-22

## 2020-04-22 PROCEDURE — G2012 BRIEF CHECK IN BY MD/QHP: HCPCS | Performed by: PHYSICIAN ASSISTANT

## 2020-04-22 RX ORDER — IBUPROFEN 600 MG/1
600 TABLET ORAL EVERY 8 HOURS PRN
Qty: 30 TABLET | Refills: 0 | Status: SHIPPED | OUTPATIENT
Start: 2020-04-22 | End: 2020-07-28

## 2020-04-22 RX ORDER — METHOCARBAMOL 750 MG/1
750 TABLET, FILM COATED ORAL 3 TIMES DAILY PRN
Qty: 42 TABLET | Refills: 0 | Status: SHIPPED | OUTPATIENT
Start: 2020-04-22 | End: 2020-05-20 | Stop reason: SDUPTHER

## 2020-04-24 ENCOUNTER — TELEPHONE (OUTPATIENT)
Dept: NEUROSURGERY | Facility: CLINIC | Age: 65
End: 2020-04-24

## 2020-05-04 ENCOUNTER — HOSPITAL ENCOUNTER (OUTPATIENT)
Dept: RADIOLOGY | Facility: HOSPITAL | Age: 65
Discharge: HOME | End: 2020-05-04
Payer: COMMERCIAL

## 2020-05-04 DIAGNOSIS — S32.010S COMPRESSION FRACTURE OF L1 VERTEBRA, SEQUELA: ICD-10-CM

## 2020-05-04 PROCEDURE — 72120 X-RAY BEND ONLY L-S SPINE: CPT

## 2020-05-06 ENCOUNTER — TELEMEDICINE (OUTPATIENT)
Dept: NEUROSURGERY | Facility: CLINIC | Age: 65
End: 2020-05-06
Payer: COMMERCIAL

## 2020-05-06 VITALS — WEIGHT: 185 LBS | BODY MASS INDEX: 27.4 KG/M2 | HEIGHT: 69 IN

## 2020-05-06 DIAGNOSIS — S32.010S COMPRESSION FRACTURE OF L1 VERTEBRA, SEQUELA: Primary | ICD-10-CM

## 2020-05-06 PROCEDURE — G2012 BRIEF CHECK IN BY MD/QHP: HCPCS | Performed by: PHYSICIAN ASSISTANT

## 2020-05-19 ENCOUNTER — HOSPITAL ENCOUNTER (OUTPATIENT)
Dept: RADIOLOGY | Facility: HOSPITAL | Age: 65
Discharge: HOME | End: 2020-05-19
Payer: COMMERCIAL

## 2020-05-19 DIAGNOSIS — S32.010S COMPRESSION FRACTURE OF L1 VERTEBRA, SEQUELA: ICD-10-CM

## 2020-05-19 PROCEDURE — 72110 X-RAY EXAM L-2 SPINE 4/>VWS: CPT

## 2020-05-20 ENCOUNTER — TELEMEDICINE (OUTPATIENT)
Dept: NEUROSURGERY | Facility: CLINIC | Age: 65
End: 2020-05-20
Payer: COMMERCIAL

## 2020-05-20 DIAGNOSIS — S32.010S COMPRESSION FRACTURE OF L1 VERTEBRA, SEQUELA: ICD-10-CM

## 2020-05-20 DIAGNOSIS — S06.0X9A CONCUSSION: ICD-10-CM

## 2020-05-20 DIAGNOSIS — S00.83XA CONTUSION OF FACE, INITIAL ENCOUNTER: ICD-10-CM

## 2020-05-20 PROCEDURE — G2012 BRIEF CHECK IN BY MD/QHP: HCPCS | Performed by: PHYSICIAN ASSISTANT

## 2020-05-20 RX ORDER — METHOCARBAMOL 750 MG/1
750 TABLET, FILM COATED ORAL 3 TIMES DAILY PRN
Qty: 42 TABLET | Refills: 0 | Status: SHIPPED | OUTPATIENT
Start: 2020-05-20

## 2020-05-27 ENCOUNTER — TELEPHONE (OUTPATIENT)
Dept: NEUROLOGY | Facility: CLINIC | Age: 65
End: 2020-05-27

## 2020-06-13 DIAGNOSIS — E03.9 ACQUIRED HYPOTHYROIDISM: ICD-10-CM

## 2020-06-15 RX ORDER — LEVOTHYROXINE SODIUM 137 UG/1
TABLET ORAL
Qty: 90 TABLET | Refills: 0 | Status: SHIPPED | OUTPATIENT
Start: 2020-06-15 | End: 2020-10-30 | Stop reason: SDUPTHER

## 2020-06-22 DIAGNOSIS — I10 ESSENTIAL HYPERTENSION: ICD-10-CM

## 2020-06-22 RX ORDER — HYDRALAZINE HYDROCHLORIDE 100 MG/1
100 TABLET, FILM COATED ORAL 3 TIMES DAILY
Qty: 90 TABLET | Refills: 1 | Status: SHIPPED | OUTPATIENT
Start: 2020-06-22 | End: 2020-08-31 | Stop reason: SDUPTHER

## 2020-06-22 RX ORDER — FUROSEMIDE 40 MG/1
40 TABLET ORAL DAILY
Qty: 90 TABLET | Refills: 1 | Status: SHIPPED | OUTPATIENT
Start: 2020-06-22 | End: 2021-01-29 | Stop reason: SDUPTHER

## 2020-07-01 DIAGNOSIS — J45.40 MODERATE PERSISTENT ASTHMA WITHOUT COMPLICATION: ICD-10-CM

## 2020-07-08 ENCOUNTER — HOSPITAL ENCOUNTER (EMERGENCY)
Facility: HOSPITAL | Age: 65
Discharge: HOME | End: 2020-07-08
Attending: EMERGENCY MEDICINE | Admitting: EMERGENCY MEDICINE
Payer: COMMERCIAL

## 2020-07-08 VITALS
BODY MASS INDEX: 32.42 KG/M2 | RESPIRATION RATE: 18 BRPM | WEIGHT: 218.92 LBS | HEIGHT: 69 IN | HEART RATE: 103 BPM | TEMPERATURE: 98 F | DIASTOLIC BLOOD PRESSURE: 77 MMHG | SYSTOLIC BLOOD PRESSURE: 143 MMHG | OXYGEN SATURATION: 99 %

## 2020-07-08 DIAGNOSIS — M54.9 BACK PAIN: Primary | ICD-10-CM

## 2020-07-08 DIAGNOSIS — R51.9 HEADACHE: ICD-10-CM

## 2020-07-08 PROCEDURE — 99284 EMERGENCY DEPT VISIT MOD MDM: CPT | Performed by: EMERGENCY MEDICINE

## 2020-07-08 PROCEDURE — 99283 EMERGENCY DEPT VISIT LOW MDM: CPT

## 2020-07-08 RX ORDER — ACETAMINOPHEN 325 MG/1
975 TABLET ORAL ONCE
Status: COMPLETED | OUTPATIENT
Start: 2020-07-08 | End: 2020-07-08

## 2020-07-08 RX ORDER — LIDOCAINE 50 MG/G
1 PATCH TOPICAL ONCE
Status: DISCONTINUED | OUTPATIENT
Start: 2020-07-08 | End: 2020-07-08 | Stop reason: HOSPADM

## 2020-07-08 RX ORDER — METHOCARBAMOL 500 MG/1
500 TABLET, FILM COATED ORAL 2 TIMES DAILY
Qty: 10 TABLET | Refills: 0 | Status: SHIPPED | OUTPATIENT
Start: 2020-07-08

## 2020-07-08 RX ADMIN — ACETAMINOPHEN 975 MG: 325 TABLET ORAL at 14:51

## 2020-07-08 RX ADMIN — LIDOCAINE 1 PATCH: 50 PATCH CUTANEOUS at 14:50

## 2020-07-08 NOTE — ED PROVIDER NOTES
History  Chief Complaint   Patient presents with    Back Pain     lower back pain x 3 days, no injury  no bowel/bladder incontinence, no numbness/tingling    Headache     headachces for several days, concerned BP is elevated, has been taking antihypertensives as prescribed       History provided by:  Patient   used: No    Back Pain   Location:  Lumbar spine  Quality:  Aching  Radiates to:  R posterior upper leg and L posterior upper leg  Pain severity:  Mild  Pain is:  Same all the time  Onset quality:  Gradual  Duration:  2 days  Timing:  Intermittent  Progression:  Waxing and waning  Chronicity:  New  Context: not falling, not lifting heavy objects, not physical stress, not recent illness and not recent injury    Relieved by:  Nothing  Worsened by:  Nothing  Ineffective treatments:  None tried  Associated symptoms: headaches    Associated symptoms: no abdominal pain, no bladder incontinence, no bowel incontinence, no chest pain, no dysuria, no fever, no numbness, no paresthesias, no perianal numbness, no tingling and no weakness    Headaches:     Severity:  Mild    Onset quality:  Gradual    Duration:  2 days    Timing:  Intermittent    Progression:  Waxing and waning    Chronicity:  New  Risk factors: obesity    Risk factors: no hx of cancer    Headache   Associated symptoms: back pain    Associated symptoms: no abdominal pain, no cough, no diarrhea, no dizziness, no eye pain, no fever, no neck pain, no neck stiffness, no numbness, no paresthesias, no sore throat, no vomiting and no weakness        Prior to Admission Medications   Prescriptions Last Dose Informant Patient Reported? Taking?    VENTOLIN  (90 Base) MCG/ACT inhaler   No No   Sig: INHALE 2 PUFFS BY MOUTH EVERY 6 HOURS AS NEEDED FOR WHEEZING   aspirin (ECOTRIN LOW STRENGTH) 81 mg EC tablet  Self No No   Sig: Take 1 tablet (81 mg total) by mouth daily   atorvastatin (LIPITOR) 40 mg tablet  Self No No   Sig: Take 1 tablet (40 mg total) by mouth daily with dinner   furosemide (LASIX) 40 mg tablet   No No   Sig: Take 1 tablet (40 mg total) by mouth daily   gabapentin (NEURONTIN) 400 mg capsule  Self No No   Sig: Take 1 capsule (400 mg total) by mouth 3 (three) times a day   hydrALAZINE (APRESOLINE) 100 MG tablet   No No   Sig: Take 1 tablet (100 mg total) by mouth 3 (three) times a day   ibuprofen (MOTRIN) 600 mg tablet  Self No No   Sig: Take 1 tablet (600 mg total) by mouth every 8 (eight) hours as needed for mild pain or moderate pain   levothyroxine 137 mcg tablet   No No   Sig: Take 1 tablet by mouth once daily   methocarbamol (ROBAXIN) 750 mg tablet   No No   Sig: Take 1 tablet (750 mg total) by mouth 3 (three) times a day as needed for muscle spasms      Facility-Administered Medications: None       Past Medical History:   Diagnosis Date    Acute renal failure (ARF) (HCC)     Anemia     Asthma     Atopic dermatitis     Chronic kidney disease     Stage 3    Colon polyps     Diabetes mellitus (HCC)     type 2    Disease of thyroid gland     hypothyroidism    Diverticulosis     Edema of both legs     Gout     H/O colonoscopy     H/O mammogram     Hemorrhoids     Hyperlipidemia     Hypertension     Nephrosclerosis     Pain in unspecified foot     Peripheral neuropathy     Phlebitis     Venous stasis dermatitis of both lower extremities     Wrist joint pain     LEFT       Past Surgical History:   Procedure Laterality Date    CERVIX SURGERY      COLONOSCOPY N/A 5/31/2016    Procedure: COLONOSCOPY;  Surgeon: Nolan Rubio MD;  Location: [facility]; Service:     VEIN SURGERY         Family History   Problem Relation Age of Onset    Diabetes Mother         Type 2 as per allscripts    Heart attack Father     Diabetes type II Sister     Hypertension Other      I have reviewed and agree with the history as documented      E-Cigarette/Vaping    E-Cigarette Use Never User      E-Cigarette/Vaping Substances Social History     Tobacco Use    Smoking status: Current Every Day Smoker     Packs/day: 0 50     Years: 15 00     Pack years: 7 50     Types: Cigarettes    Smokeless tobacco: Never Used    Tobacco comment: 30   Substance Use Topics    Alcohol use: Yes     Comment: social    Drug use: No       Review of Systems   Constitutional: Negative for chills and fever  HENT: Negative for facial swelling, sore throat and trouble swallowing  Eyes: Negative for pain and visual disturbance  Respiratory: Negative for cough and shortness of breath  Cardiovascular: Negative for chest pain and leg swelling  Gastrointestinal: Negative for abdominal pain, bowel incontinence, diarrhea and vomiting  Genitourinary: Negative for bladder incontinence, dysuria and flank pain  Musculoskeletal: Positive for back pain  Negative for neck pain and neck stiffness  Skin: Negative for pallor and rash  Allergic/Immunologic: Negative for environmental allergies and immunocompromised state  Neurological: Positive for headaches  Negative for dizziness, tingling, weakness, numbness and paresthesias  Hematological: Negative for adenopathy  Does not bruise/bleed easily  Psychiatric/Behavioral: Negative for agitation and behavioral problems  All other systems reviewed and are negative  Physical Exam  Physical Exam   Constitutional: She is oriented to person, place, and time  She appears well-developed and well-nourished  No distress  HENT:   Head: Normocephalic and atraumatic  Eyes: EOM are normal    Neck: Normal range of motion  Neck supple  Cardiovascular: Normal rate, regular rhythm, normal heart sounds and intact distal pulses  Pulmonary/Chest: Effort normal and breath sounds normal    Abdominal: Soft  Bowel sounds are normal  There is no tenderness  There is no rebound and no guarding  Musculoskeletal: Normal range of motion     No midline lumbar spine tenderness, swelling or deformity, neurovascular intact distally bilateral lower extremities   Neurological: She is alert and oriented to person, place, and time  Skin: Skin is warm and dry  Psychiatric: She has a normal mood and affect  Nursing note and vitals reviewed  Vital Signs  ED Triage Vitals [07/08/20 1352]   Temperature Pulse Respirations Blood Pressure SpO2   98 °F (36 7 °C) 103 18 143/77 99 %      Temp src Heart Rate Source Patient Position - Orthostatic VS BP Location FiO2 (%)   -- Monitor -- -- --      Pain Score       Worst Possible Pain           Vitals:    07/08/20 1352   BP: 143/77   Pulse: 103         Visual Acuity  Visual Acuity      Most Recent Value   L Pupil Size (mm)  4   R Pupil Size (mm)  4          ED Medications  Medications   lidocaine (LIDODERM) 5 % patch 1 patch (1 patch Topical Medication Applied 7/8/20 1450)   acetaminophen (TYLENOL) tablet 975 mg (975 mg Oral Given 7/8/20 1451)       Diagnostic Studies  Results Reviewed     None                 No orders to display              Procedures  Procedures         ED Course       US AUDIT      Most Recent Value   Initial Alcohol Screen: US AUDIT-C    1  How often do you have a drink containing alcohol? 1 Filed at: 07/08/2020 1354   2  How many drinks containing alcohol do you have on a typical day you are drinking? 0 Filed at: 07/08/2020 1354   3a  Male UNDER 65: How often do you have five or more drinks on one occasion? 0 Filed at: 07/08/2020 1354   3b  FEMALE Any Age, or MALE 65+: How often do you have 4 or more drinks on one occassion? 0 Filed at: 07/08/2020 1354   Audit-C Score  1 Filed at: 07/08/2020 1354                  YESSY/DAST-10      Most Recent Value   How many times in the past year have you    Used an illegal drug or used a prescription medication for non-medical reasons?   Never Filed at: 07/08/2020 1354                                MDM  Number of Diagnoses or Management Options  Back pain:   Headache:   Diagnosis management comments: Patient is a 73-year-old female, comes with complaints of headache back pain, radiating to posterior thighs, denies bowel or bladder incontinence, no injuries or falls; headache is generalized, came on gradually since yesterday, did not take any medications pain  On exam, no acute distress:  Vital signs are stable, well-appearing, nontoxic, pupils equal round reactive light, no focal neuro deficits, patient freely ambulatory in the room, able to sit and lay down on the bed without difficulty, no significant finding on physical exam as above  Impression:  Nonspecific lumbar back pain, sciatica, nonspecific headache, possibly migraine, patient did not take any medications, will give Tylenol, lidocaine patch for back, Robaxin, advise follow-up with PCP and spine complaints of program         Disposition  Final diagnoses:   Back pain   Headache     Time reflects when diagnosis was documented in both MDM as applicable and the Disposition within this note     Time User Action Codes Description Comment    7/8/2020  2:34 PM Leah Soto Add [M54 9] Back pain     7/8/2020  2:34 PM Alam, [address] Headache       ED Disposition     ED Disposition Condition Date/Time Comment    Discharge Stable Wed Jul 8, 2020  2:34 PM Forrest Natarajan discharge to home/self care  Follow-up Information     Follow up With Specialties Details Why Contact Info Additional Information    Eliza Smart,  Family Medicine Schedule an appointment as soon as possible for a visit in 2 days  [address] [place] [address]       [id number] [place] [address] Physical Therapy Call   [phone]          Discharge Medication List as of 7/8/2020  2:42 PM      START taking these medications    Details   !! methocarbamol (ROBAXIN) 500 mg tablet Take 1 tablet (500 mg total) by mouth 2 (two) times a day, Starting Wed 7/8/2020, Print       !! - Potential duplicate medications found   Please discuss with provider  CONTINUE these medications which have NOT CHANGED    Details   aspirin (ECOTRIN LOW STRENGTH) 81 mg EC tablet Take 1 tablet (81 mg total) by mouth daily, Starting Sun 8/25/2019, No Print      atorvastatin (LIPITOR) 40 mg tablet Take 1 tablet (40 mg total) by mouth daily with dinner, Starting Tue 1/7/2020, Normal      furosemide (LASIX) 40 mg tablet Take 1 tablet (40 mg total) by mouth daily, Starting Mon 6/22/2020, Normal      gabapentin (NEURONTIN) 400 mg capsule Take 1 capsule (400 mg total) by mouth 3 (three) times a day, Starting Tue 1/7/2020, Normal      hydrALAZINE (APRESOLINE) 100 MG tablet Take 1 tablet (100 mg total) by mouth 3 (three) times a day, Starting Mon 6/22/2020, Normal      ibuprofen (MOTRIN) 600 mg tablet Take 1 tablet (600 mg total) by mouth every 8 (eight) hours as needed for mild pain or moderate pain, Starting Wed 4/22/2020, Normal      levothyroxine 137 mcg tablet Take 1 tablet by mouth once daily, Normal      !! methocarbamol (ROBAXIN) 750 mg tablet Take 1 tablet (750 mg total) by mouth 3 (three) times a day as needed for muscle spasms, Starting Wed 5/20/2020, Normal      VENTOLIN  (90 Base) MCG/ACT inhaler INHALE 2 PUFFS BY MOUTH EVERY 6 HOURS AS NEEDED FOR WHEEZING, Normal       !! - Potential duplicate medications found  Please discuss with provider  No discharge procedures on file      PDMP Review     None          ED Provider  Electronically Signed by           Santos Carter MD  07/08/20 027

## 2020-07-14 ENCOUNTER — APPOINTMENT (EMERGENCY)
Dept: RADIOLOGY | Facility: HOSPITAL | Age: 65
End: 2020-07-14
Payer: COMMERCIAL

## 2020-07-14 ENCOUNTER — APPOINTMENT (EMERGENCY)
Dept: NON INVASIVE DIAGNOSTICS | Facility: HOSPITAL | Age: 65
End: 2020-07-14
Payer: COMMERCIAL

## 2020-07-14 ENCOUNTER — HOSPITAL ENCOUNTER (EMERGENCY)
Facility: HOSPITAL | Age: 65
Discharge: HOME | End: 2020-07-14
Attending: EMERGENCY MEDICINE
Payer: COMMERCIAL

## 2020-07-14 VITALS
DIASTOLIC BLOOD PRESSURE: 81 MMHG | WEIGHT: 201.28 LBS | OXYGEN SATURATION: 98 % | TEMPERATURE: 98.2 F | HEART RATE: 62 BPM | RESPIRATION RATE: 16 BRPM | SYSTOLIC BLOOD PRESSURE: 175 MMHG | BODY MASS INDEX: 29.72 KG/M2

## 2020-07-14 DIAGNOSIS — I82.409 DVT (DEEP VENOUS THROMBOSIS) (HCC): Primary | ICD-10-CM

## 2020-07-14 LAB
ANION GAP SERPL CALCULATED.3IONS-SCNC: 10 MMOL/L (ref 4–13)
BASOPHILS # BLD AUTO: 0.03 THOUSANDS/ΜL (ref 0–0.1)
BASOPHILS NFR BLD AUTO: 0 % (ref 0–1)
BUN SERPL-MCNC: 21 MG/DL (ref 5–25)
CALCIUM SERPL-MCNC: 8.2 MG/DL (ref 8.3–10.1)
CHLORIDE SERPL-SCNC: 107 MMOL/L (ref 100–108)
CO2 SERPL-SCNC: 24 MMOL/L (ref 21–32)
CREAT SERPL-MCNC: 1.04 MG/DL (ref 0.6–1.3)
EOSINOPHIL # BLD AUTO: 0.24 THOUSAND/ΜL (ref 0–0.61)
EOSINOPHIL NFR BLD AUTO: 3 % (ref 0–6)
ERYTHROCYTE [DISTWIDTH] IN BLOOD BY AUTOMATED COUNT: 13.4 % (ref 11.6–15.1)
GFR SERPL CREATININE-BSD FRML MDRD: 58 ML/MIN/1.73SQ M
GLUCOSE SERPL-MCNC: 104 MG/DL (ref 65–140)
HCT VFR BLD AUTO: 36.2 % (ref 34.8–46.1)
HGB BLD-MCNC: 11.2 G/DL (ref 11.5–15.4)
IMM GRANULOCYTES # BLD AUTO: 0.05 THOUSAND/UL (ref 0–0.2)
IMM GRANULOCYTES NFR BLD AUTO: 1 % (ref 0–2)
LYMPHOCYTES # BLD AUTO: 1.27 THOUSANDS/ΜL (ref 0.6–4.47)
LYMPHOCYTES NFR BLD AUTO: 16 % (ref 14–44)
MCH RBC QN AUTO: 31.2 PG (ref 26.8–34.3)
MCHC RBC AUTO-ENTMCNC: 30.9 G/DL (ref 31.4–37.4)
MCV RBC AUTO: 101 FL (ref 82–98)
MONOCYTES # BLD AUTO: 0.78 THOUSAND/ΜL (ref 0.17–1.22)
MONOCYTES NFR BLD AUTO: 10 % (ref 4–12)
NEUTROPHILS # BLD AUTO: 5.83 THOUSANDS/ΜL (ref 1.85–7.62)
NEUTS SEG NFR BLD AUTO: 70 % (ref 43–75)
NRBC BLD AUTO-RTO: 0 /100 WBCS
PLATELET # BLD AUTO: 233 THOUSANDS/UL (ref 149–390)
PMV BLD AUTO: 10.8 FL (ref 8.9–12.7)
POTASSIUM SERPL-SCNC: 3.5 MMOL/L (ref 3.5–5.3)
RBC # BLD AUTO: 3.59 MILLION/UL (ref 3.81–5.12)
SODIUM SERPL-SCNC: 141 MMOL/L (ref 136–145)
WBC # BLD AUTO: 8.2 THOUSAND/UL (ref 4.31–10.16)

## 2020-07-14 PROCEDURE — 73564 X-RAY EXAM KNEE 4 OR MORE: CPT

## 2020-07-14 PROCEDURE — 73610 X-RAY EXAM OF ANKLE: CPT

## 2020-07-14 PROCEDURE — 85025 COMPLETE CBC W/AUTO DIFF WBC: CPT | Performed by: PHYSICIAN ASSISTANT

## 2020-07-14 PROCEDURE — 99284 EMERGENCY DEPT VISIT MOD MDM: CPT | Performed by: EMERGENCY MEDICINE

## 2020-07-14 PROCEDURE — 93971 EXTREMITY STUDY: CPT | Performed by: SURGERY

## 2020-07-14 PROCEDURE — 86618 LYME DISEASE ANTIBODY: CPT | Performed by: PHYSICIAN ASSISTANT

## 2020-07-14 PROCEDURE — 93971 EXTREMITY STUDY: CPT

## 2020-07-14 PROCEDURE — 80048 BASIC METABOLIC PNL TOTAL CA: CPT | Performed by: PHYSICIAN ASSISTANT

## 2020-07-14 PROCEDURE — 99284 EMERGENCY DEPT VISIT MOD MDM: CPT

## 2020-07-14 PROCEDURE — 36415 COLL VENOUS BLD VENIPUNCTURE: CPT | Performed by: PHYSICIAN ASSISTANT

## 2020-07-14 RX ADMIN — RIVAROXABAN 15 MG: 15 TABLET, FILM COATED ORAL at 12:47

## 2020-07-14 NOTE — ED PROVIDER NOTES
History  Chief Complaint   Patient presents with    Knee Pain     pt c/o left knee pain and swelling since friday, denies injury or trauma  pt describes pain as "squeezing like rubber band"  denies     60year old female presents today complaining of left knee pain and swelling x 4 days  She denies recent injury  No history of trauma  She describes the pain as a tightness and states that it feels like a rubber band squeezing her knee  She denies radiation of pain  Reports decreased ROM due to pain  She has not been taking any medications for her symptoms  Of note, pt also reports some LLE swelling  Reports a remote history of DVT approx 16 years ago which was unprovoked  She denies history of clotting disorders  She denies CP, SOB, back pain, abdominal pain, nausea, vomiting, diarrhea  Pt works as a  and noticed that her symptoms are worse after shifts  Prior to Admission Medications   Prescriptions Last Dose Informant Patient Reported? Taking?    VENTOLIN  (90 Base) MCG/ACT inhaler   No No   Sig: INHALE 2 PUFFS BY MOUTH EVERY 6 HOURS AS NEEDED FOR WHEEZING   aspirin (ECOTRIN LOW STRENGTH) 81 mg EC tablet  Self No No   Sig: Take 1 tablet (81 mg total) by mouth daily   Patient not taking: Reported on 7/17/2020   atorvastatin (LIPITOR) 40 mg tablet  Self No No   Sig: Take 1 tablet (40 mg total) by mouth daily with dinner   furosemide (LASIX) 40 mg tablet   No No   Sig: Take 1 tablet (40 mg total) by mouth daily   gabapentin (NEURONTIN) 400 mg capsule  Self No No   Sig: Take 1 capsule (400 mg total) by mouth 3 (three) times a day   hydrALAZINE (APRESOLINE) 100 MG tablet   No No   Sig: Take 1 tablet (100 mg total) by mouth 3 (three) times a day   ibuprofen (MOTRIN) 600 mg tablet  Self No No   Sig: Take 1 tablet (600 mg total) by mouth every 8 (eight) hours as needed for mild pain or moderate pain   Patient not taking: Reported on 7/17/2020   levothyroxine 137 mcg tablet   No No   Sig: Take 1 tablet by mouth once daily   methocarbamol (ROBAXIN) 500 mg tablet   No No   Sig: Take 1 tablet (500 mg total) by mouth 2 (two) times a day   methocarbamol (ROBAXIN) 750 mg tablet   No No   Sig: Take 1 tablet (750 mg total) by mouth 3 (three) times a day as needed for muscle spasms   Patient not taking: Reported on 7/17/2020      Facility-Administered Medications: None       Past Medical History:   Diagnosis Date    Acute renal failure (ARF) (HCC)     Anemia     Asthma     Atopic dermatitis     Chronic kidney disease     Stage 3    Colon polyps     Diabetes mellitus (HCC)     type 2    Disease of thyroid gland     hypothyroidism    Diverticulosis     Edema of both legs     Gout     H/O colonoscopy     H/O mammogram     Hemorrhoids     Hyperlipidemia     Hypertension     Nephrosclerosis     Pain in unspecified foot     Peripheral neuropathy     Phlebitis     Venous stasis dermatitis of both lower extremities     Wrist joint pain     LEFT       Past Surgical History:   Procedure Laterality Date    CERVIX SURGERY      COLONOSCOPY N/A 5/31/2016    Procedure: COLONOSCOPY;  Surgeon: Josh Mon MD;  Location: [facility]; Service:     VEIN SURGERY         Family History   Problem Relation Age of Onset    Diabetes Mother         Type 2 as per allscripts    Heart attack Father     Diabetes type II Sister     Hypertension Other      I have reviewed and agree with the history as documented  E-Cigarette/Vaping    E-Cigarette Use Never User      E-Cigarette/Vaping Substances     Social History     Tobacco Use    Smoking status: Current Every Day Smoker     Packs/day: 0 50     Years: 15 00     Pack years: 7 50     Types: Cigarettes    Smokeless tobacco: Never Used    Tobacco comment: 30   Substance Use Topics    Alcohol use: Yes     Frequency: Monthly or less     Comment: social    Drug use: No       Review of Systems   Musculoskeletal: Positive for arthralgias and joint swelling     All other systems reviewed and are negative  Physical Exam  Physical Exam   Constitutional: She is oriented to person, place, and time  She appears well-developed and well-nourished  No distress  HENT:   Head: Normocephalic and atraumatic  Cardiovascular: Normal rate and regular rhythm  Pulmonary/Chest: Effort normal and breath sounds normal  No stridor  No respiratory distress  She has no wheezes  Abdominal: Soft  Bowel sounds are normal  She exhibits no distension  There is no tenderness  There is no guarding  Musculoskeletal:        Left knee: She exhibits decreased range of motion, swelling and effusion  She exhibits no ecchymosis, no deformity, no laceration and no erythema  No erythema or warmth, though pt does have a half-dollar sized lacy red rash noted to the anterior patella  1+ pitting edema noted to the L foot and distal shin  Sensation and pulses intact  Neg Annie's sign  Neurological: She is alert and oriented to person, place, and time  No sensory deficit  Skin: Capillary refill takes less than 2 seconds  Rash noted  She is not diaphoretic  Psychiatric: She has a normal mood and affect   Her behavior is normal        Vital Signs  ED Triage Vitals [07/14/20 1103]   Temperature Pulse Respirations Blood Pressure SpO2   98 2 °F (36 8 °C) 68 17 (!) 205/97 98 %      Temp Source Heart Rate Source Patient Position - Orthostatic VS BP Location FiO2 (%)   Temporal Monitor Sitting Left arm --      Pain Score       --           Vitals:    07/14/20 1103 07/14/20 1152 07/14/20 1247   BP: (!) 205/97 (!) 190/84 (!) 175/81   Pulse: 68  62   Patient Position - Orthostatic VS: Sitting           Visual Acuity      ED Medications  Medications   rivaroxaban (XARELTO) tablet 15 mg (15 mg Oral Given 7/14/20 1247)       Diagnostic Studies  Results Reviewed     Procedure Component Value Units Date/Time    Lyme Antibody Profile with reflex to [facility] [[id number]] Collected:  07/14/20 1150    Lab Status:  Final result Specimen:  Blood from Arm, Left Updated:  07/15/20 2006     Lyme IgG/IgM Ab <0 91 ISR     Narrative:       Performed at:   [facility] [address] [place] [address]  [id number]  : Jessica Serrano MD, Phone:  [phone]    Basic metabolic panel [[id number]]  (Abnormal) Collected:  07/14/20 1150    Lab Status:  Final result Specimen:  Blood from Arm, Left Updated:  07/14/20 1209     Sodium 141 mmol/L      Potassium 3 5 mmol/L      Chloride 107 mmol/L      CO2 24 mmol/L      ANION GAP 10 mmol/L      BUN 21 mg/dL      Creatinine 1 04 mg/dL      Glucose 104 mg/dL      Calcium 8 2 mg/dL      eGFR 58 ml/min/1 73sq m     Narrative:       [facility] guidelines for Chronic Kidney Disease (CKD):     Stage 1 with normal or high GFR (GFR > 90 mL/min/1 73 square meters)    Stage 2 Mild CKD (GFR = 60-89 mL/min/1 73 square meters)    Stage 3A Moderate CKD (GFR = 45-59 mL/min/1 73 square meters)    Stage 3B Moderate CKD (GFR = 30-44 mL/min/1 73 square meters)    Stage 4 Severe CKD (GFR = 15-29 mL/min/1 73 square meters)    Stage 5 End Stage CKD (GFR <15 mL/min/1 73 square meters)  Note: GFR calculation is accurate only with a steady state creatinine    CBC and differential [[id number]]  (Abnormal) Collected:  07/14/20 1150    Lab Status:  Final result Specimen:  Blood from Arm, Left Updated:  07/14/20 1158     WBC 8 20 Thousand/uL      RBC 3 59 Million/uL      Hemoglobin 11 2 g/dL      Hematocrit 36 2 %       fL      MCH 31 2 pg      MCHC 30 9 g/dL      RDW 13 4 %      MPV 10 8 fL      Platelets 237 Thousands/uL      nRBC 0 /100 WBCs      Neutrophils Relative 70 %      Immat GRANS % 1 %      Lymphocytes Relative 16 %      Monocytes Relative 10 %      Eosinophils Relative 3 %      Basophils Relative 0 %      Neutrophils Absolute 5 83 Thousands/µL      Immature Grans Absolute 0 05 Thousand/uL      Lymphocytes Absolute 1 27 Thousands/µL      Monocytes Absolute 0 78 Thousand/µL      Eosinophils Absolute 0 24 Thousand/µL      Basophils Absolute 0 03 Thousands/µL                  VAS lower limb venous duplex study, unilateral/limited   Final Result by Greta Wolf MD (07/14 1523)      XR knee 4+ vw left injury   Final Result by Colleen Morales DO (07/14 1323)   Diffuse soft tissue swelling  Moderate suprapatellar joint effusion  No acute osseous abnormality  Workstation performed: [id number]         XR ankle 3+ views LEFT   Final Result by Colleen Morales DO (07/14 1321)   Significant circumferential soft tissues of the ankle, most pronounced medially  No convincing fractures  No radiopaque foreign bodies  No soft tissue gas  Workstation performed: [id number]                    Procedures  Procedures         ED Course  ED Course as of Jul 19 2226   Tue Jul 14, 2020   1249 Per vascular tech, + DVT LLE gastroc with NO extension into popliteal          US AUDIT      Most Recent Value   Initial Alcohol Screen: US AUDIT-C    1  How often do you have a drink containing alcohol? 1 Filed at: 07/14/2020 1109   2  How many drinks containing alcohol do you have on a typical day you are drinking? 0 Filed at: 07/14/2020 1109   3b  FEMALE Any Age, or MALE 65+: How often do you have 4 or more drinks on one occassion? 0 Filed at: 07/14/2020 1109   Audit-C Score  1 Filed at: 07/14/2020 1109                  YESSY/DAST-10      Most Recent Value   How many times in the past year have you    Used an illegal drug or used a prescription medication for non-medical reasons?   Never Filed at: 07/14/2020 1109                                MDM      Disposition  Final diagnoses:   DVT (deep venous thrombosis) ([facility] [address] )     Time reflects when diagnosis was documented in both MDM as applicable and the Disposition within this note     Time User Action Codes Description Comment    7/14/2020 12:30 PM Nuha Alexander [I82 409] DVT (deep venous thrombosis) [facility])       ED Disposition ED Disposition Condition Date/Time Comment    Discharge Stable Tue Jul 14, 2020 12:42 PM Nita Dunaway discharge to home/self care              Follow-up Information     Follow up With Specialties Details Why Contact Jazmyne    Clyde Members, DO Family Medicine Schedule an appointment as soon as possible for a visit   [id number]  [address]            Discharge Medication List as of 7/14/2020 12:43 PM      START taking these medications    Details   rivaroxaban (XARELTO) 15 mg tablet Take 1 tablet (15 mg total) by mouth every 12 (twelve) hours for 21 days, Starting Tue 7/14/2020, Until Tue 8/4/2020, Normal         CONTINUE these medications which have NOT CHANGED    Details   atorvastatin (LIPITOR) 40 mg tablet Take 1 tablet (40 mg total) by mouth daily with dinner, Starting Tue 1/7/2020, Normal      furosemide (LASIX) 40 mg tablet Take 1 tablet (40 mg total) by mouth daily, Starting Mon 6/22/2020, Normal      gabapentin (NEURONTIN) 400 mg capsule Take 1 capsule (400 mg total) by mouth 3 (three) times a day, Starting Tue 1/7/2020, Normal      hydrALAZINE (APRESOLINE) 100 MG tablet Take 1 tablet (100 mg total) by mouth 3 (three) times a day, Starting Mon 6/22/2020, Normal      levothyroxine 137 mcg tablet Take 1 tablet by mouth once daily, Normal      !! methocarbamol (ROBAXIN) 500 mg tablet Take 1 tablet (500 mg total) by mouth 2 (two) times a day, Starting Wed 7/8/2020, Print      VENTOLIN  (90 Base) MCG/ACT inhaler INHALE 2 PUFFS BY MOUTH EVERY 6 HOURS AS NEEDED FOR WHEEZING, Normal      aspirin (ECOTRIN LOW STRENGTH) 81 mg EC tablet Take 1 tablet (81 mg total) by mouth daily, Starting Sun 8/25/2019, No Print      ibuprofen (MOTRIN) 600 mg tablet Take 1 tablet (600 mg total) by mouth every 8 (eight) hours as needed for mild pain or moderate pain, Starting Wed 4/22/2020, Normal      !! methocarbamol (ROBAXIN) 750 mg tablet Take 1 tablet (750 mg total) by mouth 3 (three) times a day as needed for muscle spasms, Starting Wed 5/20/2020, Normal       !! - Potential duplicate medications found  Please discuss with provider  No discharge procedures on file      PDMP Review       Value Time User    PDMP Reviewed  Yes 7/15/2020  2:50 PM Jayne Rosa DO          ED Provider  Electronically Signed by           Anel Kruger PA-C  07/19/20 7436

## 2020-07-15 ENCOUNTER — OFFICE VISIT (OUTPATIENT)
Dept: FAMILY MEDICINE CLINIC | Facility: CLINIC | Age: 65
End: 2020-07-15
Payer: COMMERCIAL

## 2020-07-15 VITALS
SYSTOLIC BLOOD PRESSURE: 184 MMHG | DIASTOLIC BLOOD PRESSURE: 98 MMHG | TEMPERATURE: 97 F | WEIGHT: 201 LBS | HEIGHT: 69 IN | BODY MASS INDEX: 29.77 KG/M2

## 2020-07-15 DIAGNOSIS — M79.605 LEFT LEG PAIN: ICD-10-CM

## 2020-07-15 DIAGNOSIS — M25.462 EFFUSION OF LEFT KNEE JOINT: Primary | ICD-10-CM

## 2020-07-15 DIAGNOSIS — I82.462 ACUTE DEEP VEIN THROMBOSIS (DVT) OF CALF MUSCLE VEIN OF LEFT LOWER EXTREMITY (HCC): ICD-10-CM

## 2020-07-15 PROBLEM — M77.02 MEDIAL EPICONDYLITIS OF ELBOW, LEFT: Status: RESOLVED | Noted: 2020-02-04 | Resolved: 2020-07-15

## 2020-07-15 LAB — B BURGDOR IGG+IGM SER-ACNC: <0.91 ISR (ref 0–0.9)

## 2020-07-15 PROCEDURE — 3066F NEPHROPATHY DOC TX: CPT | Performed by: FAMILY MEDICINE

## 2020-07-15 PROCEDURE — 99214 OFFICE O/P EST MOD 30 MIN: CPT | Performed by: FAMILY MEDICINE

## 2020-07-15 PROCEDURE — 3044F HG A1C LEVEL LT 7.0%: CPT | Performed by: FAMILY MEDICINE

## 2020-07-15 PROCEDURE — 3008F BODY MASS INDEX DOCD: CPT | Performed by: FAMILY MEDICINE

## 2020-07-15 PROCEDURE — 3077F SYST BP >= 140 MM HG: CPT | Performed by: FAMILY MEDICINE

## 2020-07-15 PROCEDURE — 3080F DIAST BP >= 90 MM HG: CPT | Performed by: FAMILY MEDICINE

## 2020-07-15 RX ORDER — TRAMADOL HYDROCHLORIDE 50 MG/1
50 TABLET ORAL EVERY 6 HOURS PRN
Qty: 30 TABLET | Refills: 0 | Status: SHIPPED | OUTPATIENT
Start: 2020-07-15 | End: 2020-10-30

## 2020-07-15 NOTE — PROGRESS NOTES
Assessment/Plan:    Effusion of left knee joint  Rest ice elevation and compression Refer to ortho    Acute deep vein thrombosis (DVT) of calf muscle vein of left lower extremity (HCC)  Continue blood thinner     Left leg pain  Tramadol for pain follwoup in 2 weeks       Diagnoses and all orders for this visit:    Effusion of left knee joint  -     Ambulatory referral to Orthopedic Surgery; Future    Acute deep vein thrombosis (DVT) of calf muscle vein of left lower extremity (HCC)  -     traMADol (ULTRAM) 50 mg tablet; Take 1 tablet (50 mg total) by mouth every 6 (six) hours as needed for moderate pain    Left leg pain  -     traMADol (ULTRAM) 50 mg tablet; Take 1 tablet (50 mg total) by mouth every 6 (six) hours as needed for moderate pain          Subjective:   Chief Complaint   Patient presents with    Follow-up     ER          Patient ID: Emily Moore is a 60 y o  female  Patient had a fall at home onto the left knee Patient slipped int he [employer] pool She landed directly on the knee Patient started with swelling and pain there "Bandlike" pressure Patient also then in the next 2 days had pain and swelling of the left calf Patient went to ER Patient was found to have effusion of the knee and also DVT Patieint has had DVT once prior almost 17 yrs ago Patient is tking the Act[employer] the pain in the calf and the knee is like 9  Out of 10 Patient cannot take NSAIDS due to her kidney failure and tylneol is not hlping      The following portions of the patient's history were reviewed and updated as appropriate: allergies, current medications, past family history, past medical history, past social history, past surgical history and problem list     Review of Systems   Constitutional: Negative for activity change, appetite change, fatigue and fever  Respiratory: Negative for cough, chest tightness, shortness of breath and wheezing  Cardiovascular: Positive for leg swelling   Negative for chest pain and palpitations  Gastrointestinal: Negative for abdominal distention and abdominal pain  Musculoskeletal: Positive for joint swelling  Left knee swelling   Psychiatric/Behavioral: Negative for dysphoric mood  The patient is not nervous/anxious  Objective:      BP (!) 184/98   Temp (!) 97 °F (36 1 °C)   Ht 5' 9" (1 753 m)   Wt 91 2 kg (201 lb)   BMI 29 68 kg/m²          Physical Exam   Constitutional: She appears well-developed and well-nourished  HENT:   Head: Normocephalic and atraumatic  Right Ear: External ear normal    Left Ear: External ear normal    Eyes: Pupils are equal, round, and reactive to light  EOM are normal    Cardiovascular: Normal rate and regular rhythm  Pulmonary/Chest: Effort normal and breath sounds normal    Abdominal: Soft  Bowel sounds are normal    Musculoskeletal:   Left knee iwht patellar effusion and swelling Patient unable to fully extend the left knee Patient also with pain and swelling mid calf to ankle on the left Patient having pain with weight bearing as well as with palpation   Skin: Skin is warm and dry  Psychiatric: She has a normal mood and affect  Her behavior is normal  Judgment and thought content normal    Nursing note and vitals reviewed

## 2020-07-16 ENCOUNTER — TELEPHONE (OUTPATIENT)
Dept: FAMILY MEDICINE CLINIC | Facility: CLINIC | Age: 65
End: 2020-07-16

## 2020-07-17 ENCOUNTER — APPOINTMENT (EMERGENCY)
Dept: RADIOLOGY | Facility: HOSPITAL | Age: 65
End: 2020-07-17
Payer: COMMERCIAL

## 2020-07-17 ENCOUNTER — HOSPITAL ENCOUNTER (EMERGENCY)
Facility: HOSPITAL | Age: 65
Discharge: HOME | End: 2020-07-17
Attending: EMERGENCY MEDICINE | Admitting: EMERGENCY MEDICINE
Payer: COMMERCIAL

## 2020-07-17 VITALS
SYSTOLIC BLOOD PRESSURE: 193 MMHG | BODY MASS INDEX: 29.19 KG/M2 | DIASTOLIC BLOOD PRESSURE: 94 MMHG | HEIGHT: 69 IN | RESPIRATION RATE: 18 BRPM | WEIGHT: 197.09 LBS | OXYGEN SATURATION: 98 % | HEART RATE: 85 BPM | TEMPERATURE: 98.3 F

## 2020-07-17 DIAGNOSIS — M79.605 LEFT LEG PAIN: ICD-10-CM

## 2020-07-17 DIAGNOSIS — S89.90XA KNEE INJURY, INITIAL ENCOUNTER: Primary | ICD-10-CM

## 2020-07-17 DIAGNOSIS — I82.402 LEFT LEG DVT (HCC): ICD-10-CM

## 2020-07-17 PROCEDURE — 73560 X-RAY EXAM OF KNEE 1 OR 2: CPT

## 2020-07-17 PROCEDURE — 99285 EMERGENCY DEPT VISIT HI MDM: CPT | Performed by: EMERGENCY MEDICINE

## 2020-07-17 PROCEDURE — 99283 EMERGENCY DEPT VISIT LOW MDM: CPT

## 2020-07-17 RX ORDER — MORPHINE SULFATE 15 MG/1
15 TABLET ORAL ONCE
Status: COMPLETED | OUTPATIENT
Start: 2020-07-17 | End: 2020-07-17

## 2020-07-17 RX ORDER — LIDOCAINE 50 MG/G
1 PATCH TOPICAL DAILY
Qty: 30 PATCH | Refills: 0 | Status: SHIPPED | OUTPATIENT
Start: 2020-07-17

## 2020-07-17 RX ORDER — LIDOCAINE 50 MG/G
1 PATCH TOPICAL ONCE
Status: DISCONTINUED | OUTPATIENT
Start: 2020-07-17 | End: 2020-07-17 | Stop reason: HOSPADM

## 2020-07-17 RX ORDER — ACETAMINOPHEN 325 MG/1
975 TABLET ORAL ONCE
Status: COMPLETED | OUTPATIENT
Start: 2020-07-17 | End: 2020-07-17

## 2020-07-17 RX ADMIN — ACETAMINOPHEN 975 MG: 325 TABLET ORAL at 11:46

## 2020-07-17 RX ADMIN — MORPHINE SULFATE 15 MG: 15 TABLET ORAL at 11:46

## 2020-07-17 RX ADMIN — LIDOCAINE 1 PATCH: 50 PATCH TOPICAL at 11:45

## 2020-07-17 NOTE — ED PROVIDER NOTES
Emergency Department Note- Mik Rodrigues 60 y o  female MRN: [id number]    Unit/Bed#: ED 25 Encounter: [id number]        History of Present Illness     Patient is 59-year-old female, presents for worsening left knee pain  About a month ago she twisted her left knee and left leg, did not think anything of it but 8 days ago noticed some swelling of her left calf and ankle  She continues to work as a , then 3 days ago was seen in the emergency department for this  X-rays of the left ankle were unremarkable, right knee showed a prepatellar effusion but no fracture  She had vascular studies in the left leg which showed a DVT, no arterial compromise  She was discharged home and prescribed Xarelto which she has been taking  Two days ago she saw her primary care physician for follow-up, was referred to Orthopedics, prescribed tramadol for pain  Patient says that she has an appoint with Orthopedics in about a week, has been taking the tramadol for pain and keeping her leg elevated indicating ice but she has been having worsening pain in the knee and the proximal calf  Today while ambulating with her crutches she slipped and fell, landing on her buttocks and hitting her left knee, she heard a pop in her left knee, and had slightly worsening pain  No numbness or tingling  No head trauma      REVIEW OF SYSTEMS     Constitutional:  No recent weight  gains or losses   Eyes:  No visual changes   ENT:  No tinnitus or hearing changes   Cardiac: No chest pain or palpitations   Respiratory:  No cough or shortness of breath   Abdominal:  No nausea or vomiting   Urinary: No dysuria or hematuria   Hematologic:  Easy bruising secondary to Xarelto   Skin: No rash   Musculoskeletal: As per HPI   Neurologic: As per HPI   Psychiatric: No mood changes      Historical Information   Past Medical History:   Diagnosis Date    Acute renal failure (ARF) (HCC)     Anemia     Asthma     Atopic dermatitis     Chronic kidney disease     Stage 3    Colon polyps     Diabetes mellitus ([facility] [address] )     type 2    Disease of thyroid gland     hypothyroidism    Diverticulosis     Edema of both legs     Gout     H/O colonoscopy     H/O mammogram     Hemorrhoids     Hyperlipidemia     Hypertension     Nephrosclerosis     Pain in unspecified foot     Peripheral neuropathy     Phlebitis     Venous stasis dermatitis of both lower extremities     Wrist joint pain     LEFT     Past Surgical History:   Procedure Laterality Date    CERVIX SURGERY      COLONOSCOPY N/A 5/31/2016    Procedure: COLONOSCOPY;  Surgeon: Aimee Moreira MD;  Location: [facility]; Service:     VEIN SURGERY       Social History   Social History     Substance and Sexual Activity   Alcohol Use Yes    Frequency: Monthly or less    Comment: social     Social History     Substance and Sexual Activity   Drug Use No     Social History     Tobacco Use   Smoking Status Current Every Day Smoker    Packs/day: 0 50    Years: 15 00    Pack years: 7 50    Types: Cigarettes   Smokeless Tobacco Never Used   Tobacco Comment    30     Family History:   Family History   Problem Relation Age of Onset    Diabetes Mother         Type 2 as per allscripts    Heart attack Father     Diabetes type II Sister     Hypertension Other        MEDICATIONS:  No current facility-administered medications on file prior to encounter        Current Outpatient Medications on File Prior to Encounter   Medication Sig Dispense Refill    atorvastatin (LIPITOR) 40 mg tablet Take 1 tablet (40 mg total) by mouth daily with dinner 90 tablet 1    furosemide (LASIX) 40 mg tablet Take 1 tablet (40 mg total) by mouth daily 90 tablet 1    gabapentin (NEURONTIN) 400 mg capsule Take 1 capsule (400 mg total) by mouth 3 (three) times a day 270 capsule 1    hydrALAZINE (APRESOLINE) 100 MG tablet Take 1 tablet (100 mg total) by mouth 3 (three) times a day 90 tablet 1    levothyroxine 137 mcg tablet Take 1 tablet by mouth once daily 90 tablet 0    methocarbamol (ROBAXIN) 500 mg tablet Take 1 tablet (500 mg total) by mouth 2 (two) times a day 10 tablet 0    rivaroxaban (XARELTO) 15 mg tablet Take 1 tablet (15 mg total) by mouth every 12 (twelve) hours for 21 days 42 tablet 0    traMADol (ULTRAM) 50 mg tablet Take 1 tablet (50 mg total) by mouth every 6 (six) hours as needed for moderate pain 30 tablet 0    VENTOLIN  (90 Base) MCG/ACT inhaler INHALE 2 PUFFS BY MOUTH EVERY 6 HOURS AS NEEDED FOR WHEEZING 18 g 0    aspirin (ECOTRIN LOW STRENGTH) 81 mg EC tablet Take 1 tablet (81 mg total) by mouth daily (Patient not taking: Reported on 7/17/2020)  0    ibuprofen (MOTRIN) 600 mg tablet Take 1 tablet (600 mg total) by mouth every 8 (eight) hours as needed for mild pain or moderate pain (Patient not taking: Reported on 7/17/2020) 30 tablet 0    methocarbamol (ROBAXIN) 750 mg tablet Take 1 tablet (750 mg total) by mouth 3 (three) times a day as needed for muscle spasms (Patient not taking: Reported on 7/17/2020) 42 tablet 0     ALLERGIES:  Allergies   Allergen Reactions    Neosporin [Neomycin-Bacitracin Zn-Polymyx]     Niacin And Related     Shellfish-Derived Products      seafood       Vitals:    07/17/20 1047 07/17/20 1049   BP: (!) 193/94    TempSrc:  Oral   Pulse: 85    Resp: 18    Temp:  98 3 °F (36 8 °C)       PHYSICAL EXAM    General:  Patient is well-appearing  Head:  Atraumatic  Eyes:  Conjunctiva pink  ENT:  Mucous membranes are moist  Neck:  Supple  Cardiac:  S1-S2, without murmurs  Lungs:  Clear to auscultation bilaterally  Abdomen:  Soft, nontender, normal bowel sounds, no CVA tenderness, no tympany, no rigidity, no guarding  Extremities:  She has some mild swelling of her left calf and ankle and foot  There is some bony tenderness to the left knee joint line diffusely  No patellar tenderness  Patella tracks well    Slight pain with range of motion of the left knee but range of motion is not limited  There is a slight prepatellar effusion  She has no warmth or redness to the knee  She has no pain to the left hip, femur, tibia, fibula, foot or ankle otherwise  She has good DP and PT pulses, sensation is normal throughout the entire leg, compartments are soft  She can do a straight leg raise but it hurts to do so  The other extremities are atraumatic, nontender with normal, painless range of motion  No midline cervical, thoracic, lumbar, sacral tenderness, deformities, or step-offs  No pelvic tenderness or instability  Neurologic:  Awake, fluent speech, normal comprehension  AAOx3  Skin:  Pink warm and dry  Psychiatric:  Alert, pleasant, cooperative            Labs Reviewed - No data to display    Medications   lidocaine (LIDODERM) 5 % patch 1 patch (1 patch Topical Medication Applied 7/17/20 1145)   acetaminophen (TYLENOL) tablet 975 mg (975 mg Oral Given 7/17/20 1146)   morphine (MSIR) IR tablet 15 mg (15 mg Oral Given 7/17/20 1146)       XR knee 1 or 2 vw left   ED Interpretation   Left knee x-ray interpreted me shows no fracture, no dislocation, slight suprapatellar effusion, no acute change from July 15, 2020          ED Course as of Jul 17 1148   Fri Jul 17, 2020   1146 On reassessment, there is no change with the above findings  Assessment/Plan     ED Medical Decision Making:    Discussed with patient about supportive care including rest, ice, elevation  Supportive care, importance of follow-up and return precautions were discussed with the patient, who expressed understanding        Time reflects when diagnosis was documented in both MDM as applicable and the Disposition within this note     Time User Action Codes Description Comment    7/17/2020 11:46 AM Keene Buerger Add [S89 90XA] Knee injury, initial encounter     7/17/2020 11:46 AM Keene Buerger Add [M79 605] Left leg pain     7/17/2020 11:46 AM Keene Buerger Add [I82 402] Left leg DVT [facility])       ED Disposition     ED Disposition Condition Date/Time Comment    Discharge Stable Fri Jul 17, 2020 11:46 AM Mik Mylesbrenda discharge to home/self care              Follow-up Information     Follow up With Specialties Details Why Contact Info    Heydi Dolye DO Family Medicine Schedule an appointment as soon as possible for a visit in 4 days  [address] (LIDODERM) 5 %    Apply 1 patch topically daily Remove & Discard patch within 12 hours or as directed by MD Norma Paul DO  07/17/20 8177

## 2020-07-17 NOTE — DISCHARGE INSTRUCTIONS
Tylenol 650 mg every 4 hours as needed for pain    Follow-up with orthopedics as scheduled    Deep Venous Thrombosis   WHAT YOU NEED TO KNOW:   Deep venous thrombosis (DVT) is a blood clot that forms in a deep vein of the body  The deep veins in the legs, thighs, and hips are the most common sites for DVT  DVT can also occur in a deep vein within your arms  The clot prevents the normal flow of blood in the vein  The blood backs up and causes pain and swelling  The DVT can break into smaller pieces and travel to your lungs and cause a blockage called a pulmonary embolism  A pulmonary embolism can become life-threatening  DISCHARGE INSTRUCTIONS:   Call 911 for any of the following: You feel lightheaded, short of breath, and have chest pain  You cough up blood  Return to the emergency department if:   Your symptoms get worse  You develop new DVT symptoms in another leg or arm  Contact your healthcare provider if:   Your gums or nose bleed  You see blood in your urine or bowel movements  Your bowel movements are black or darker than normal     You have questions or concerns about your conditions or care  Your blood pressure was elevated in the emergency department today  You should make an appointment with your doctor in the next 1 week for follow-up and recheck of the blood pressure

## 2020-07-21 ENCOUNTER — OFFICE VISIT (OUTPATIENT)
Dept: OBGYN CLINIC | Facility: MEDICAL CENTER | Age: 65
End: 2020-07-21
Payer: COMMERCIAL

## 2020-07-21 VITALS
BODY MASS INDEX: 29.98 KG/M2 | WEIGHT: 203 LBS | SYSTOLIC BLOOD PRESSURE: 144 MMHG | TEMPERATURE: 97.4 F | DIASTOLIC BLOOD PRESSURE: 80 MMHG

## 2020-07-21 DIAGNOSIS — Z12.4 SCREENING FOR CERVICAL CANCER: ICD-10-CM

## 2020-07-21 DIAGNOSIS — N83.202 LEFT OVARIAN CYST: ICD-10-CM

## 2020-07-21 DIAGNOSIS — Z01.419 ENCOUNTER FOR WELL WOMAN EXAM WITH ROUTINE GYNECOLOGICAL EXAM: Primary | ICD-10-CM

## 2020-07-21 DIAGNOSIS — Z12.31 ENCOUNTER FOR SCREENING MAMMOGRAM FOR MALIGNANT NEOPLASM OF BREAST: ICD-10-CM

## 2020-07-21 PROCEDURE — 87624 HPV HI-RISK TYP POOLED RSLT: CPT | Performed by: OBSTETRICS & GYNECOLOGY

## 2020-07-21 PROCEDURE — 3044F HG A1C LEVEL LT 7.0%: CPT | Performed by: OBSTETRICS & GYNECOLOGY

## 2020-07-21 PROCEDURE — G0145 SCR C/V CYTO,THINLAYER,RESCR: HCPCS | Performed by: OBSTETRICS & GYNECOLOGY

## 2020-07-21 PROCEDURE — 99386 PREV VISIT NEW AGE 40-64: CPT | Performed by: OBSTETRICS & GYNECOLOGY

## 2020-07-21 NOTE — PROGRESS NOTES
ASSESSMENT & PLAN: Ligia Farmer is a 60 y o  [id number] with normal gynecologic exam     1   Routine well woman exam done today  2  Pap and HPV:  The patient's last pap and hpv was unknown  It was normal     Pap with cotesting was done today  Current ASCCP Guidelines reviewed  3   Mammogram ordered, last done   4  Colorectal cancer screening was not ordered  5  The following were reviewed in today's visit: breast self exam and mammography screening ordered  6  Recently diagnosed with DVT    CC:  Annual Gynecologic Examination    HPI: Ligia Farmer is a 60 y o  [id number] who presents for annual gynecologic examination  She has the following concerns:  No GYN complaints; has been years since her last exam  States had an abnormal pap 30 years ago    Health Maintenance:    She wears her seatbelt routinely  She does perform regular monthly self breast exams  She feels safe at home  Pap: unkown  Last mammogram:   Last colonoscopy:     Past Medical History:   Diagnosis Date    Acute renal failure (ARF) (HCC)     Anemia     Asthma     Atopic dermatitis     Chronic kidney disease     Stage 3    Colon polyps     Diabetes mellitus (HCC)     type 2    Disease of thyroid gland     hypothyroidism    Diverticulosis     Edema of both legs     Gout     H/O colonoscopy     H/O mammogram     Hemorrhoids     Hyperlipidemia     Hypertension     Nephrosclerosis     Pain in unspecified foot     Peripheral neuropathy     Phlebitis     Venous stasis dermatitis of both lower extremities     Wrist joint pain     LEFT       Past Surgical History:   Procedure Laterality Date    CERVIX SURGERY      COLONOSCOPY N/A 2016    Procedure: COLONOSCOPY;  Surgeon: Nolan Rubio MD;  Location: [facility];   Service:     VEIN SURGERY         Past OB/Gyn History:  OB History        7    Para   5    Term   5            AB   2    Living   5       SAB   2    TAB        Ectopic Multiple        Live Births   5               Pt does not have menstrual issues  menopausal   History of sexually transmitted infection: No   History of abnormal pap smears: Yes states had a scraping        Family History   Problem Relation Age of Onset    Diabetes Mother         Type 2 as per allscripts    Heart attack Father     Diabetes type II Sister     Hypertension Other        Social History:  Social History     Socioeconomic History    Marital status: /Civil Union     Spouse name: Not on file    Number of children: Not on file    Years of education: Not on file    Highest education level: Not on file   Occupational History    Not on file   Social Needs    Financial resource strain: Not on file    Food insecurity:     Worry: Not on file     Inability: Not on file    Transportation needs:     Medical: Not on file     Non-medical: Not on file   Tobacco Use    Smoking status: Current Every Day Smoker     Packs/day: 0 25     Years: 15 00     Pack years: 3 75     Types: Cigarettes    Smokeless tobacco: Never Used    Tobacco comment: 5 cigarettes a day   Substance and Sexual Activity    Alcohol use: Yes     Frequency: Monthly or less     Comment: social    Drug use: No    Sexual activity: Yes     Partners: Male     Birth control/protection: Post-menopausal   Lifestyle    Physical activity:     Days per week: Not on file     Minutes per session: Not on file    Stress: Not on file   Relationships    Social connections:     Talks on phone: Not on file     Gets together: Not on file     Attends [employer] service: Not on file     Active member of club or organization: Not on file     Attends meetings of clubs or organizations: Not on file     Relationship status: Not on file    Intimate partner violence:     Fear of current or ex partner: Not on file     Emotionally abused: Not on file     Physically abused: Not on file     Forced sexual activity: Not on file   Other Topics Concern    Not on file   Social History Narrative    Caffeine use    Daily caffeine consumption, 1 serving a day    Uses safety equipment- seatbelts       Allergies   Allergen Reactions    Neosporin [Neomycin-Bacitracin Zn-Polymyx]     Niacin And Related     Shellfish-Derived Products      seafood       Current Outpatient Medications:     atorvastatin (LIPITOR) 40 mg tablet, Take 1 tablet (40 mg total) by mouth daily with dinner, Disp: 90 tablet, Rfl: 1    furosemide (LASIX) 40 mg tablet, Take 1 tablet (40 mg total) by mouth daily, Disp: 90 tablet, Rfl: 1    gabapentin (NEURONTIN) 400 mg capsule, Take 1 capsule (400 mg total) by mouth 3 (three) times a day, Disp: 270 capsule, Rfl: 1    hydrALAZINE (APRESOLINE) 100 MG tablet, Take 1 tablet (100 mg total) by mouth 3 (three) times a day, Disp: 90 tablet, Rfl: 1    levothyroxine 137 mcg tablet, Take 1 tablet by mouth once daily, Disp: 90 tablet, Rfl: 0    lidocaine (LIDODERM) 5 %, Apply 1 patch topically daily Remove & Discard patch within 12 hours or as directed by MD, Disp: 30 patch, Rfl: 0    methocarbamol (ROBAXIN) 500 mg tablet, Take 1 tablet (500 mg total) by mouth 2 (two) times a day, Disp: 10 tablet, Rfl: 0    rivaroxaban (XARELTO) 15 mg tablet, Take 1 tablet (15 mg total) by mouth every 12 (twelve) hours for 21 days, Disp: 42 tablet, Rfl: 0    traMADol (ULTRAM) 50 mg tablet, Take 1 tablet (50 mg total) by mouth every 6 (six) hours as needed for moderate pain, Disp: 30 tablet, Rfl: 0    VENTOLIN  (90 Base) MCG/ACT inhaler, INHALE 2 PUFFS BY MOUTH EVERY 6 HOURS AS NEEDED FOR WHEEZING, Disp: 18 g, Rfl: 0    aspirin (ECOTRIN LOW STRENGTH) 81 mg EC tablet, Take 1 tablet (81 mg total) by mouth daily (Patient not taking: Reported on 7/17/2020), Disp: , Rfl: 0    ibuprofen (MOTRIN) 600 mg tablet, Take 1 tablet (600 mg total) by mouth every 8 (eight) hours as needed for mild pain or moderate pain (Patient not taking: Reported on 7/17/2020), Disp: 30 tablet, Rfl: 0    methocarbamol (ROBAXIN) 750 mg tablet, Take 1 tablet (750 mg total) by mouth 3 (three) times a day as needed for muscle spasms (Patient not taking: Reported on 7/17/2020), Disp: 42 tablet, Rfl: 0      Review of Systems  Constitutional :no fever, feels well, no tiredness, no recent weight gain or loss  ENT: no ear ache, no loss of hearing, no nosebleeds or nasal discharge, no sore throat or hoarseness  Cardiovascular: no complaints of slow or fast heart beat, no chest pain, no palpitations, no leg claudication or lower extremity edema  Respiratory: no complaints of shortness of shortness of breath, no [place]  Breasts:no complaints of breast pain, breast lump, or nipple discharge  Gastrointestinal: no complaints of abdominal pain, constipation, nausea, vomiting, or diarrhea or bloody stools  Genitourinary : no complaints of dysuria, incontinence, pelvic pain, no dysmenorrhea, vaginal discharge or abnormal vaginal bleeding and as noted in HPI  Musculoskeletal: no complaints of arthralgia, no myalgia, no joint swelling or stiffness, no limb pain or swelling    Integumentary: no complaints of skin rash or lesion, itching or dry skin  Neurological: no complaints of headache, no confusion, no numbness or tingling, no dizziness or fainting    Objective      /80   Temp (!) 97 4 °F (36 3 °C)   Wt 92 1 kg (203 lb)   BMI 29 98 kg/m²     General:   appears stated age, cooperative, alert normal mood and affect   Neck: normal, supple,trachea midline, no masses   Heart: regular rate and rhythm, S1, S2 normal, no murmur, click, rub or gallop   Lungs: clear to auscultation bilaterally   Breasts: normal appearance, no masses or tenderness, Inspection negative, No nipple retraction or dimpling, No nipple discharge or bleeding, No axillary or supraclavicular adenopathy, Normal to palpation without dominant masses   Abdomen: soft, non-tender, without masses or organomegaly   Vulva: normal female genitalia, Bartholin's, Urethra, [place] normal, no lesions, normal female hair distribution   Vagina: normal vagina, no discharge, exudate, lesion, or erythema; atrophic    Urethra: normal   Cervix: Normal, no discharge  PAP done  Uterus: normal size, contour, position, consistency, mobility, non-tender   Adnexa: normal adnexa and no mass, fullness, tenderness   Lymphatic palpation of lymph nodes in neck, axilla, groin and/or other locations: no lymphadenopathy or masses noted   Skin normal skin turgor and no rashes   Swelling in LLE   Psychiatric orientation to person, place, and time: normal  mood and affect: normal

## 2020-07-23 LAB
HPV HR 12 DNA CVX QL NAA+PROBE: NEGATIVE
HPV16 DNA CVX QL NAA+PROBE: NEGATIVE
HPV18 DNA CVX QL NAA+PROBE: NEGATIVE

## 2020-07-27 ENCOUNTER — OFFICE VISIT (OUTPATIENT)
Dept: OBGYN CLINIC | Facility: MEDICAL CENTER | Age: 65
End: 2020-07-27
Payer: COMMERCIAL

## 2020-07-27 VITALS
HEIGHT: 69 IN | TEMPERATURE: 98.8 F | BODY MASS INDEX: 30.07 KG/M2 | SYSTOLIC BLOOD PRESSURE: 157 MMHG | WEIGHT: 203 LBS | HEART RATE: 76 BPM | DIASTOLIC BLOOD PRESSURE: 78 MMHG

## 2020-07-27 DIAGNOSIS — M25.462 EFFUSION OF LEFT KNEE JOINT: ICD-10-CM

## 2020-07-27 DIAGNOSIS — S83.242D OTHER TEAR OF MEDIAL MENISCUS OF LEFT KNEE AS CURRENT INJURY, SUBSEQUENT ENCOUNTER: Primary | ICD-10-CM

## 2020-07-27 PROCEDURE — 3066F NEPHROPATHY DOC TX: CPT | Performed by: ORTHOPAEDIC SURGERY

## 2020-07-27 PROCEDURE — 3044F HG A1C LEVEL LT 7.0%: CPT | Performed by: ORTHOPAEDIC SURGERY

## 2020-07-27 PROCEDURE — 20610 DRAIN/INJ JOINT/BURSA W/O US: CPT | Performed by: ORTHOPAEDIC SURGERY

## 2020-07-27 PROCEDURE — 99203 OFFICE O/P NEW LOW 30 MIN: CPT | Performed by: ORTHOPAEDIC SURGERY

## 2020-07-27 PROCEDURE — 3008F BODY MASS INDEX DOCD: CPT | Performed by: ORTHOPAEDIC SURGERY

## 2020-07-27 PROCEDURE — 3078F DIAST BP <80 MM HG: CPT | Performed by: ORTHOPAEDIC SURGERY

## 2020-07-27 PROCEDURE — 3077F SYST BP >= 140 MM HG: CPT | Performed by: ORTHOPAEDIC SURGERY

## 2020-07-27 RX ORDER — METHYLPREDNISOLONE ACETATE 40 MG/ML
1 INJECTION, SUSPENSION INTRA-ARTICULAR; INTRALESIONAL; INTRAMUSCULAR; SOFT TISSUE
Status: COMPLETED | OUTPATIENT
Start: 2020-07-27 | End: 2020-07-27

## 2020-07-27 RX ORDER — LIDOCAINE HYDROCHLORIDE 10 MG/ML
3 INJECTION, SOLUTION INFILTRATION; PERINEURAL
Status: COMPLETED | OUTPATIENT
Start: 2020-07-27 | End: 2020-07-27

## 2020-07-27 RX ADMIN — LIDOCAINE HYDROCHLORIDE 3 ML: 10 INJECTION, SOLUTION INFILTRATION; PERINEURAL at 09:10

## 2020-07-27 RX ADMIN — METHYLPREDNISOLONE ACETATE 1 ML: 40 INJECTION, SUSPENSION INTRA-ARTICULAR; INTRALESIONAL; INTRAMUSCULAR; SOFT TISSUE at 09:10

## 2020-07-27 NOTE — PROGRESS NOTES
Ortho Sports Medicine Knee New Patient Visit     Assesment:     60 y o  female left leg acute DVT being treated with PCP  Possible mensicus tear    Plan:    Conservative treatment:    Ice to knee for 20 minutes at least 1-2 times daily  PT for ROM/strengthening to knee, hip and core  OTC NSAIDS prn for pain  Continue follow-up with PCP for DVT    Imaging: All imaging from today was reviewed by myself and explained to the patient  Injection:    The risks and benefits of the injection (which include but are not limited to: infection, bleeding,damage to nerve/artery, need for further intervention), as well as the risks and benefits of all alternative treatments were explained and understood  The patient elected to proceed with injection  The procedure was done with aseptic technique, and the patient tolerated the procedure well with no complications  A corticosteroid injection was performed  The patient should take 1-2 days off of activity, with gradual return to activity as tolerated  Ice to the knee 1-2 times daily for 20 minutes, for next 24-48 hrs  Surgery:     No surgery is recommended at this point, continue with conservative treatment plan as noted  Follow up:    Return if symptoms worsen or fail to improve  Chief Complaint   Patient presents with    Left Knee - Pain       History of Present Illness: The patient is a 60 y o  female whose occupation is a , referred to me by the emergency room, seen in clinic for evaluation of left knee pain  Pain is located anterior, posterior  The patient rates the pain as a 10/10  The pain has been present for a few weeks  The patient sustained an injury on a few weeks ago where she tripped over a quilt  Patient states that she felt her knee swell up  The patient states that the pain went away however few days later she noticed extreme pain and swelling in her leg absence she went to the emergency room    While the emergency room they discovered a blood clot  Which is being treated for with her primary care physician  The mechanism of injury was a twist and a fall  The pain is characterized as dull, achy  The pain is present at all times  Pain is improved by rest, ice and NSAIDS  Pain is aggravated by weight bearing  Symptoms include swelling  The patient has tried rest, ice and NSAIDS  Knee Surgical History:  None    Past Medical, Social and Family History:  Past Medical History:   Diagnosis Date    Acute renal failure (ARF) ([address] )     Anemia     Asthma     Atopic dermatitis     Chronic kidney disease     Stage 3    Colon polyps     Diabetes mellitus (HCC)     type 2    Disease of thyroid gland     hypothyroidism    Diverticulosis     Edema of both legs     Gout     H/O colonoscopy     H/O mammogram     Hemorrhoids     Hyperlipidemia     Hypertension     Nephrosclerosis     Pain in unspecified foot     Peripheral neuropathy     Phlebitis     Venous stasis dermatitis of both lower extremities     Wrist joint pain     LEFT     Past Surgical History:   Procedure Laterality Date    CERVIX SURGERY      COLONOSCOPY N/A 5/31/2016    Procedure: COLONOSCOPY;  Surgeon: Jose M Britton MD;  Location: [facility];   Service:     VEIN SURGERY       Allergies   Allergen Reactions    Neosporin [Neomycin-Bacitracin Zn-Polymyx]     Niacin And Related     Shellfish-Derived Products      seafood     Current Outpatient Medications on File Prior to Visit   Medication Sig Dispense Refill    aspirin (ECOTRIN LOW STRENGTH) 81 mg EC tablet Take 1 tablet (81 mg total) by mouth daily  0    atorvastatin (LIPITOR) 40 mg tablet Take 1 tablet (40 mg total) by mouth daily with dinner 90 tablet 1    furosemide (LASIX) 40 mg tablet Take 1 tablet (40 mg total) by mouth daily 90 tablet 1    gabapentin (NEURONTIN) 400 mg capsule Take 1 capsule (400 mg total) by mouth 3 (three) times a day 270 capsule 1    hydrALAZINE (APRESOLINE) 100 MG tablet Take 1 tablet (100 mg total) by mouth 3 (three) times a day 90 tablet 1    ibuprofen (MOTRIN) 600 mg tablet Take 1 tablet (600 mg total) by mouth every 8 (eight) hours as needed for mild pain or moderate pain 30 tablet 0    levothyroxine 137 mcg tablet Take 1 tablet by mouth once daily 90 tablet 0    lidocaine (LIDODERM) 5 % Apply 1 patch topically daily Remove & Discard patch within 12 hours or as directed by MD 30 patch 0    methocarbamol (ROBAXIN) 500 mg tablet Take 1 tablet (500 mg total) by mouth 2 (two) times a day 10 tablet 0    methocarbamol (ROBAXIN) 750 mg tablet Take 1 tablet (750 mg total) by mouth 3 (three) times a day as needed for muscle spasms 42 tablet 0    rivaroxaban (XARELTO) 15 mg tablet Take 1 tablet (15 mg total) by mouth every 12 (twelve) hours for 21 days 42 tablet 0    traMADol (ULTRAM) 50 mg tablet Take 1 tablet (50 mg total) by mouth every 6 (six) hours as needed for moderate pain 30 tablet 0    VENTOLIN  (90 Base) MCG/ACT inhaler INHALE 2 PUFFS BY MOUTH EVERY 6 HOURS AS NEEDED FOR WHEEZING 18 g 0     No current facility-administered medications on file prior to visit        Social History     Socioeconomic History    Marital status: /Civil Union     Spouse name: Not on file    Number of children: Not on file    Years of education: Not on file    Highest education level: Not on file   Occupational History    Not on file   Social Needs    Financial resource strain: Not on file    Food insecurity:     Worry: Not on file     Inability: Not on file    Transportation needs:     Medical: Not on file     Non-medical: Not on file   Tobacco Use    Smoking status: Current Every Day Smoker     Packs/day: 0 25     Years: 15 00     Pack years: 3 75     Types: Cigarettes    Smokeless tobacco: Never Used    Tobacco comment: 5 cigarettes a day   Substance and Sexual Activity    Alcohol use: Yes     Frequency: Monthly or less     Comment: social  Drug use: No    Sexual activity: Yes     Partners: Male     Birth control/protection: Post-menopausal   Lifestyle    Physical activity:     Days per week: Not on file     Minutes per session: Not on file    Stress: Not on file   Relationships    Social connections:     Talks on phone: Not on file     Gets together: Not on file     Attends Hoahaoism service: Not on file     Active member of club or organization: Not on file     Attends meetings of clubs or organizations: Not on file     Relationship status: Not on file    Intimate partner violence:     Fear of current or ex partner: Not on file     Emotionally abused: Not on file     Physically abused: Not on file     Forced sexual activity: Not on file   Other Topics Concern    Not on file   Social History Narrative    Caffeine use    Daily caffeine consumption, 1 serving a day    Uses safety equipment- seatbelts     I have reviewed the past medical, surgical, social and family history, medications and allergies as documented in the EMR  Review of systems: ROS is negative other than that noted in the HPI  Constitutional: Negative for fatigue and fever  HENT: Negative for sore throat  Respiratory: Negative for shortness of breath  Cardiovascular: Negative for chest pain  Gastrointestinal: Negative for abdominal pain  Endocrine: Negative for cold intolerance and heat intolerance  Genitourinary: Negative for flank pain  Musculoskeletal: Negative for back pain  Skin: Negative for rash  Allergic/Immunologic: Negative for immunocompromised state  Neurological: Negative for dizziness  Psychiatric/Behavioral: Negative for agitation  Physical Exam:    Blood pressure 157/78, pulse 76, temperature 98 8 °F (37 1 °C), height 5' 9" (1 753 m), weight 92 1 kg (203 lb)      General/Constitutional: NAD, well developed, well nourished  HENT: Normocephalic, atraumatic  CV: Intact distal pulses, regular rate  Resp: No respiratory distress or labored breathing  Lymphatic: No lymphadenopathy palpated  Neuro: Alert and Oriented x 3, no focal deficits  Psych: Normal mood, normal affect, normal judgement, normal behavior  Skin: Warm, dry, no rashes, no erythema    Knee Exam (focused): RIGHT LEFT   ROM:   0-130 0-90   Palpation: Effusion negative mild     MJL tenderness Negative Positive     LJL tenderness Negative Positive   Meniscus: Mireille Negative Negative    Apley's Compression Negative Negative   Instability: Varus stable stable     Valgus stable stable   Special Tests: Lachman Negative Negative     Posterior drawer Negative Negative     Anterior drawer Negative Negative     Pivot shift not tested not tested     Dial not tested not tested   Patella: Palpation no tenderness no tenderness     Mobility 1/4 1/4     Apprehension Negative Negative   Other: Single leg 1/4 squat not tested not tested    Large joint arthrocentesis: L knee  Date/Time: 7/27/2020 9:10 AM  Consent given by: patient and parent  Site marked: site marked  Timeout: Immediately prior to procedure a time out was called to verify the correct patient, procedure, equipment, support staff and site/side marked as required   Supporting Documentation  Indications: pain and joint swelling   Procedure Details  Location: knee - L knee  Needle size: 22 G  Ultrasound guidance: no  Approach: anterolateral  Medications administered: 3 mL lidocaine 1 %; 1 mL methylPREDNISolone acetate 40 mg/mL    Patient tolerance: patient tolerated the procedure well with no immediate complications  Dressing:  Sterile dressing applied          LE NV Exam: +2 DP/PT pulses bilaterally  Sensation intact to light touch L2-S1 bilaterally     Bilateral hip ROM demonstrates no pain actively or passively    No calf tenderness to palpation bilaterally    Knee Imaging    X-rays of the left knee were reviewed, which demonstrate mild medial joint osteoarthritis    I have reviewed the radiology report and agree with their impression      Scribe Attestation    I,:   Blank Melo am acting as a scribe while in the presence of the attending physician :        I,:   Mel Michel, DO personally performed the services described in this documentation    as scribed in my presence :

## 2020-07-28 ENCOUNTER — OFFICE VISIT (OUTPATIENT)
Dept: FAMILY MEDICINE CLINIC | Facility: CLINIC | Age: 65
End: 2020-07-28
Payer: COMMERCIAL

## 2020-07-28 VITALS
SYSTOLIC BLOOD PRESSURE: 160 MMHG | BODY MASS INDEX: 30.07 KG/M2 | WEIGHT: 203 LBS | HEIGHT: 69 IN | DIASTOLIC BLOOD PRESSURE: 82 MMHG | TEMPERATURE: 97 F

## 2020-07-28 DIAGNOSIS — I82.462 ACUTE DEEP VEIN THROMBOSIS (DVT) OF CALF MUSCLE VEIN OF LEFT LOWER EXTREMITY (HCC): Primary | ICD-10-CM

## 2020-07-28 DIAGNOSIS — I82.409 DVT (DEEP VENOUS THROMBOSIS) (HCC): ICD-10-CM

## 2020-07-28 DIAGNOSIS — J45.40 MODERATE PERSISTENT ASTHMA WITHOUT COMPLICATION: ICD-10-CM

## 2020-07-28 DIAGNOSIS — I10 ESSENTIAL HYPERTENSION: ICD-10-CM

## 2020-07-28 DIAGNOSIS — L30.0 NUMMULAR ECZEMA: ICD-10-CM

## 2020-07-28 DIAGNOSIS — Z72.0 TOBACCO ABUSE: Chronic | ICD-10-CM

## 2020-07-28 DIAGNOSIS — M25.462 EFFUSION OF LEFT KNEE JOINT: ICD-10-CM

## 2020-07-28 DIAGNOSIS — E78.2 MIXED HYPERLIPIDEMIA: ICD-10-CM

## 2020-07-28 DIAGNOSIS — E03.9 ACQUIRED HYPOTHYROIDISM: ICD-10-CM

## 2020-07-28 DIAGNOSIS — Z86.73 HISTORY OF CVA (CEREBROVASCULAR ACCIDENT): ICD-10-CM

## 2020-07-28 PROCEDURE — 3077F SYST BP >= 140 MM HG: CPT | Performed by: FAMILY MEDICINE

## 2020-07-28 PROCEDURE — 3044F HG A1C LEVEL LT 7.0%: CPT | Performed by: FAMILY MEDICINE

## 2020-07-28 PROCEDURE — 3079F DIAST BP 80-89 MM HG: CPT | Performed by: FAMILY MEDICINE

## 2020-07-28 PROCEDURE — 3066F NEPHROPATHY DOC TX: CPT | Performed by: FAMILY MEDICINE

## 2020-07-28 PROCEDURE — 99214 OFFICE O/P EST MOD 30 MIN: CPT | Performed by: FAMILY MEDICINE

## 2020-07-28 PROCEDURE — 3008F BODY MASS INDEX DOCD: CPT | Performed by: FAMILY MEDICINE

## 2020-07-28 RX ORDER — TRIAMCINOLONE ACETONIDE 1 MG/G
CREAM TOPICAL 2 TIMES DAILY
Qty: 45 G | Refills: 2 | Status: SHIPPED | OUTPATIENT
Start: 2020-07-28 | End: 2021-05-12

## 2020-07-28 RX ORDER — NIFEDIPINE 30 MG/1
30 TABLET, FILM COATED, EXTENDED RELEASE ORAL DAILY
Qty: 90 TABLET | Refills: 1 | Status: SHIPPED | OUTPATIENT
Start: 2020-07-28 | End: 2020-10-30 | Stop reason: SDUPTHER

## 2020-07-28 NOTE — PROGRESS NOTES
Assessment/Plan:    Effusion of left knee joint   Somewhat improved  Follow-up with orthopedics as scheduled  Continue ice 2-3 times per day  Acute deep vein thrombosis (DVT) of calf muscle vein of left lower extremity (HCC)    Continue Xarelto  May use a heating pad to this area 3 times per day  With her smoking history, prior DVT, and history of miscarriages, consideration for lifelong anticoagulation may be considered  She will discuss this with my partner when she sees her at the next visit  Also, I would not discontinue medication for at least 3 months except for emergency surgery  Would not recommend any elective procedures at this time  Essential hypertension    Pressures have not been well controlled of late, will add nifedipine  Recheck in the office in a month  Tobacco abuse    Even with her having recurrent DVTs  Prior stroke, patient has no interest in quitting smoking at this time  Diagnoses and all orders for this visit:    Acute deep vein thrombosis (DVT) of calf muscle vein of left lower extremity (HCC)    Moderate persistent asthma without complication  -     VENTOLIN  (90 Base) MCG/ACT inhaler; Inhale 2 puffs every 6 (six) hours as needed for wheezing    DVT (deep venous thrombosis) (HCC)  -     rivaroxaban (XARELTO) 15 mg tablet; Take 1 tablet (15 mg total) by mouth every 12 (twelve) hours for 21 days    Acquired hypothyroidism  -     T4; Future  -     TSH, 3rd generation; Future    Essential hypertension  -     NIFEdipine ER (ADALAT CC) 30 MG 24 hr tablet; Take 1 tablet (30 mg total) by mouth daily    Mixed hyperlipidemia  -     Lipid panel; Future  -     Comprehensive metabolic panel;  Future    History of CVA (cerebrovascular accident)    Nummular eczema  -     triamcinolone (KENALOG) 0 1 % cream; Apply topically 2 (two) times a day    Effusion of left knee joint    Tobacco abuse          Subjective:   Chief Complaint   Patient presents with    Follow-up     left knee pain           Patient ID: Ligia Farmer is a 60 y o  female  She is here for follow-up on her knee pain and recent DVT, however she has multiple other complaints today that need to be addressed  The knee is somewhat improved, had injection done by orthopedics the other day  States the pain is somewhat improved  She has had prior DVTs, and miscarriages in the past   She continues to smoke  She is requesting cream for her eczema, cream she currently is using does not work well she use some of her 's cream in that did work well  The following portions of the patient's history were reviewed and updated as appropriate: allergies, current medications, past family history, past medical history, past social history, past surgical history and problem list     Review of Systems   Constitutional: Negative for activity change, chills, diaphoresis, fatigue, fever and unexpected weight change  HENT: Negative for congestion, ear pain, hearing loss, nosebleeds, postnasal drip, rhinorrhea, sinus pressure, sore throat and tinnitus  Eyes: Negative for photophobia, pain, discharge, redness and visual disturbance  Respiratory: Negative for cough, chest tightness, shortness of breath and wheezing  Cardiovascular: Positive for leg swelling  Negative for chest pain and palpitations  Denies [place]   Gastrointestinal: Negative for abdominal pain, blood in stool, constipation, diarrhea, nausea and vomiting  Endocrine: Negative  Genitourinary: Negative for difficulty urinating, dysuria, frequency, hematuria and urgency  Musculoskeletal: Positive for arthralgias  Negative for joint swelling and myalgias  Skin: Positive for rash  Negative for wound  Denies skin lesions, change in birthmarks   Allergic/Immunologic: Negative for environmental allergies, food allergies and immunocompromised state     Neurological: Negative for dizziness, tremors, seizures, syncope, weakness, numbness and headaches  Hematological: Negative  Psychiatric/Behavioral: Negative for behavioral problems, confusion, decreased concentration and sleep disturbance  The patient is nervous/anxious  Objective:      /82   Temp (!) 97 °F (36 1 °C)   Ht 5' 9" (1 753 m)   Wt 92 1 kg (203 lb)   BMI 29 98 kg/m²          Physical Exam   Constitutional: She is oriented to person, place, and time  She appears well-developed and well-nourished  No distress  Overweight   HENT:   Head: Normocephalic and atraumatic  Right Ear: External ear normal    Left Ear: External ear normal    Nose: Nose normal    Mouth/Throat: Oropharynx is clear and moist    Eyes: Pupils are equal, round, and reactive to light  Conjunctivae and EOM are normal    Neck: Normal range of motion  Neck supple  No JVD present  No tracheal deviation present  No thyromegaly present  Cardiovascular: Normal rate, regular rhythm and normal heart sounds  No murmur heard  Plus three pitting edema of the left lower extremity, multiple bilateral varicosities  Pulmonary/Chest: Effort normal and breath sounds normal  She has no wheezes  She has no rales  Abdominal: Soft  Bowel sounds are normal  She exhibits no mass  There is no tenderness  There is no rebound and no guarding  Musculoskeletal: She exhibits no edema  Left knee: She exhibits decreased range of motion and effusion  Tenderness found  Medial joint line and lateral joint line tenderness noted  Lymphadenopathy:     She has no cervical adenopathy  Neurological: She is alert and oriented to person, place, and time  She has normal reflexes  No cranial nerve deficit  Skin: Skin is warm and dry  No lesion and no rash noted  Psychiatric: She has a normal mood and affect  Judgment normal    Vitals reviewed

## 2020-07-28 NOTE — ASSESSMENT & PLAN NOTE
Pressures have not been well controlled of late, will add nifedipine  Recheck in the office in a month

## 2020-07-28 NOTE — ASSESSMENT & PLAN NOTE
Continue Xarelto  May use a heating pad to this area 3 times per day  With her smoking history, prior DVT, and history of miscarriages, consideration for lifelong anticoagulation may be considered  She will discuss this with my partner when she sees her at the next visit  Also, I would not discontinue medication for at least 3 months except for emergency surgery  Would not recommend any elective procedures at this time

## 2020-07-29 ENCOUNTER — TELEPHONE (OUTPATIENT)
Dept: FAMILY MEDICINE CLINIC | Facility: CLINIC | Age: 65
End: 2020-07-29

## 2020-07-29 LAB
LAB AP GYN PRIMARY INTERPRETATION: NORMAL
Lab: NORMAL

## 2020-07-29 NOTE — TELEPHONE ENCOUNTER
We did discuss that her blood pressure was elevated, and has been elevated the last 3 times she has been in the office  That is for her blood pressure  She needs to see Dr Ronnie Resendez in a month for recheck

## 2020-08-07 DIAGNOSIS — E78.2 MIXED HYPERLIPIDEMIA: ICD-10-CM

## 2020-08-07 RX ORDER — ATORVASTATIN CALCIUM 40 MG/1
40 TABLET, FILM COATED ORAL
Qty: 90 TABLET | Refills: 1 | Status: SHIPPED | OUTPATIENT
Start: 2020-08-07 | End: 2020-11-18 | Stop reason: SDUPTHER

## 2020-08-31 DIAGNOSIS — I82.409 DVT (DEEP VENOUS THROMBOSIS) (HCC): ICD-10-CM

## 2020-08-31 DIAGNOSIS — I10 ESSENTIAL HYPERTENSION: ICD-10-CM

## 2020-08-31 DIAGNOSIS — J45.40 MODERATE PERSISTENT ASTHMA WITHOUT COMPLICATION: ICD-10-CM

## 2020-08-31 RX ORDER — HYDRALAZINE HYDROCHLORIDE 100 MG/1
100 TABLET, FILM COATED ORAL 3 TIMES DAILY
Qty: 90 TABLET | Refills: 1 | Status: SHIPPED | OUTPATIENT
Start: 2020-08-31 | End: 2020-11-20 | Stop reason: SDUPTHER

## 2020-09-02 DIAGNOSIS — N83.202 LEFT OVARIAN CYST: Primary | ICD-10-CM

## 2020-09-29 DIAGNOSIS — I82.409 DVT (DEEP VENOUS THROMBOSIS) (HCC): ICD-10-CM

## 2020-09-29 DIAGNOSIS — I82.462 ACUTE DEEP VEIN THROMBOSIS (DVT) OF CALF MUSCLE VEIN OF LEFT LOWER EXTREMITY (HCC): Primary | ICD-10-CM

## 2020-09-29 RX ORDER — RIVAROXABAN 15 MG/1
TABLET, FILM COATED ORAL
Qty: 30 TABLET | Refills: 0 | OUTPATIENT
Start: 2020-09-29

## 2020-09-30 DIAGNOSIS — E03.9 ACQUIRED HYPOTHYROIDISM: ICD-10-CM

## 2020-10-01 RX ORDER — LEVOTHYROXINE SODIUM 150 UG/1
TABLET ORAL
Qty: 90 TABLET | Refills: 0 | OUTPATIENT
Start: 2020-10-01

## 2020-10-30 ENCOUNTER — OFFICE VISIT (OUTPATIENT)
Dept: FAMILY MEDICINE CLINIC | Facility: CLINIC | Age: 65
End: 2020-10-30
Payer: COMMERCIAL

## 2020-10-30 VITALS
SYSTOLIC BLOOD PRESSURE: 148 MMHG | HEIGHT: 69 IN | BODY MASS INDEX: 30.81 KG/M2 | DIASTOLIC BLOOD PRESSURE: 98 MMHG | WEIGHT: 208 LBS | TEMPERATURE: 97.4 F

## 2020-10-30 DIAGNOSIS — I82.409 RECURRENT DEEP VEIN THROMBOSIS (DVT) (HCC): ICD-10-CM

## 2020-10-30 DIAGNOSIS — F51.02 ADJUSTMENT INSOMNIA: ICD-10-CM

## 2020-10-30 DIAGNOSIS — E03.9 ACQUIRED HYPOTHYROIDISM: ICD-10-CM

## 2020-10-30 DIAGNOSIS — I10 ESSENTIAL HYPERTENSION: Primary | ICD-10-CM

## 2020-10-30 DIAGNOSIS — G60.9 IDIOPATHIC PERIPHERAL NEUROPATHY: ICD-10-CM

## 2020-10-30 DIAGNOSIS — Z72.0 TOBACCO ABUSE: Chronic | ICD-10-CM

## 2020-10-30 DIAGNOSIS — N83.202 LEFT OVARIAN CYST: ICD-10-CM

## 2020-10-30 DIAGNOSIS — Z23 NEED FOR VACCINATION: ICD-10-CM

## 2020-10-30 DIAGNOSIS — I63.81 RIGHT-SIDED LACUNAR INFARCTION (HCC): ICD-10-CM

## 2020-10-30 DIAGNOSIS — I82.462 ACUTE DEEP VEIN THROMBOSIS (DVT) OF CALF MUSCLE VEIN OF LEFT LOWER EXTREMITY (HCC): ICD-10-CM

## 2020-10-30 DIAGNOSIS — E78.2 MIXED HYPERLIPIDEMIA: ICD-10-CM

## 2020-10-30 PROBLEM — M25.462 EFFUSION OF LEFT KNEE JOINT: Status: RESOLVED | Noted: 2020-07-15 | Resolved: 2020-10-30

## 2020-10-30 PROBLEM — M79.605 LEFT LEG PAIN: Status: RESOLVED | Noted: 2020-07-15 | Resolved: 2020-10-30

## 2020-10-30 PROCEDURE — 3080F DIAST BP >= 90 MM HG: CPT | Performed by: FAMILY MEDICINE

## 2020-10-30 PROCEDURE — 99214 OFFICE O/P EST MOD 30 MIN: CPT | Performed by: FAMILY MEDICINE

## 2020-10-30 PROCEDURE — 3008F BODY MASS INDEX DOCD: CPT | Performed by: FAMILY MEDICINE

## 2020-10-30 PROCEDURE — 3077F SYST BP >= 140 MM HG: CPT | Performed by: FAMILY MEDICINE

## 2020-10-30 PROCEDURE — 90682 RIV4 VACC RECOMBINANT DNA IM: CPT | Performed by: FAMILY MEDICINE

## 2020-10-30 PROCEDURE — 3725F SCREEN DEPRESSION PERFORMED: CPT | Performed by: FAMILY MEDICINE

## 2020-10-30 PROCEDURE — 90471 IMMUNIZATION ADMIN: CPT | Performed by: FAMILY MEDICINE

## 2020-10-30 RX ORDER — LEVOTHYROXINE SODIUM 137 UG/1
137 TABLET ORAL DAILY
Qty: 90 TABLET | Refills: 0 | Status: SHIPPED | OUTPATIENT
Start: 2020-10-30 | End: 2021-01-29 | Stop reason: SDUPTHER

## 2020-10-30 RX ORDER — NIFEDIPINE 60 MG/1
60 TABLET, FILM COATED, EXTENDED RELEASE ORAL DAILY
Qty: 90 TABLET | Refills: 0 | Status: SHIPPED | OUTPATIENT
Start: 2020-10-30 | End: 2021-01-29 | Stop reason: SDUPTHER

## 2020-10-30 RX ORDER — GABAPENTIN 400 MG/1
400 CAPSULE ORAL 3 TIMES DAILY
Qty: 270 CAPSULE | Refills: 1 | Status: SHIPPED | OUTPATIENT
Start: 2020-10-30 | End: 2021-01-29 | Stop reason: SDUPTHER

## 2020-10-30 RX ORDER — NIFEDIPINE 30 MG/1
30 TABLET, FILM COATED, EXTENDED RELEASE ORAL DAILY
Qty: 90 TABLET | Refills: 1 | Status: SHIPPED | OUTPATIENT
Start: 2020-10-30 | End: 2020-10-30

## 2020-10-30 RX ORDER — ZOLPIDEM TARTRATE 5 MG/1
5 TABLET ORAL
Qty: 30 TABLET | Refills: 0 | Status: SHIPPED | OUTPATIENT
Start: 2020-10-30

## 2020-11-13 ENCOUNTER — TELEPHONE (OUTPATIENT)
Dept: SURGICAL ONCOLOGY | Facility: CLINIC | Age: 65
End: 2020-11-13

## 2020-11-13 ENCOUNTER — TRANSCRIBE ORDERS (OUTPATIENT)
Dept: FAMILY MEDICINE CLINIC | Facility: CLINIC | Age: 65
End: 2020-11-13

## 2020-11-13 DIAGNOSIS — Z87.19 HISTORY OF DENTAL PROBLEMS: Primary | ICD-10-CM

## 2020-11-18 DIAGNOSIS — E78.2 MIXED HYPERLIPIDEMIA: ICD-10-CM

## 2020-11-19 RX ORDER — ATORVASTATIN CALCIUM 40 MG/1
40 TABLET, FILM COATED ORAL
Qty: 90 TABLET | Refills: 1 | Status: SHIPPED | OUTPATIENT
Start: 2020-11-19 | End: 2021-01-29 | Stop reason: SDUPTHER

## 2020-11-20 DIAGNOSIS — I10 ESSENTIAL HYPERTENSION: ICD-10-CM

## 2020-11-20 RX ORDER — HYDRALAZINE HYDROCHLORIDE 100 MG/1
100 TABLET, FILM COATED ORAL 3 TIMES DAILY
Qty: 90 TABLET | Refills: 1 | Status: SHIPPED | OUTPATIENT
Start: 2020-11-20 | End: 2021-01-29 | Stop reason: SDUPTHER

## 2020-11-24 DIAGNOSIS — I82.462 ACUTE DEEP VEIN THROMBOSIS (DVT) OF CALF MUSCLE VEIN OF LEFT LOWER EXTREMITY (HCC): ICD-10-CM

## 2020-12-10 DIAGNOSIS — Z86.73 HISTORY OF CVA (CEREBROVASCULAR ACCIDENT): Primary | ICD-10-CM

## 2021-01-08 ENCOUNTER — CONSULT (OUTPATIENT)
Dept: HEMATOLOGY ONCOLOGY | Facility: CLINIC | Age: 66
End: 2021-01-08
Payer: COMMERCIAL

## 2021-01-08 VITALS
WEIGHT: 214 LBS | BODY MASS INDEX: 31.7 KG/M2 | OXYGEN SATURATION: 96 % | HEIGHT: 69 IN | TEMPERATURE: 98.3 F | HEART RATE: 93 BPM | RESPIRATION RATE: 18 BRPM | DIASTOLIC BLOOD PRESSURE: 78 MMHG | SYSTOLIC BLOOD PRESSURE: 150 MMHG

## 2021-01-08 DIAGNOSIS — I82.462 ACUTE DEEP VEIN THROMBOSIS (DVT) OF CALF MUSCLE VEIN OF LEFT LOWER EXTREMITY (HCC): ICD-10-CM

## 2021-01-08 DIAGNOSIS — I82.409 RECURRENT DEEP VEIN THROMBOSIS (DVT) (HCC): ICD-10-CM

## 2021-01-08 PROCEDURE — 3077F SYST BP >= 140 MM HG: CPT | Performed by: INTERNAL MEDICINE

## 2021-01-08 PROCEDURE — 3078F DIAST BP <80 MM HG: CPT | Performed by: INTERNAL MEDICINE

## 2021-01-08 PROCEDURE — 99244 OFF/OP CNSLTJ NEW/EST MOD 40: CPT | Performed by: INTERNAL MEDICINE

## 2021-01-08 NOTE — PROGRESS NOTES
Hematology / Oncology Outpatient Consult Note    Jose Centeno 63 y o  female [id number]         Date:  1/8/2021    Assessment / Plan:  A 72-year-old postmenopausal woman who has 2 episode of unprovoked distal lower extremity DVT on each side, 1st in 2003 and 2nd in July 2020  Since the 2nd unprovoked DVT, she has been on rivaroxaban 20 mg daily  She is going to have extensive teeth reduction by Dr Joe Mike on January 26, 2021  She presents today to have surgical clearance from hematology standpoint  I recommended her to hold rivaroxaban for 2 days prior to this surgery  She may resume rivaroxaban on 1 postoperative day assuming that she has no active bleeding  Bridging is not indicated  With above condition, she is cleared for surgery from hematology standpoint  She is in agreement with my recommendation  Regarding her long-term anticoagulation, I recommended her to be on indefinite anticoagulation since she had 2 episode of unprovoked DVT  She is in agreement  Subjective:     HPI:  A 72-year-old postmenopausal woman who had history of unprovoked right distal lower extremity DVT, diagnosed in 2003  She was treated with 6 months of warfarin  She had another episode of unprovoked DVT in her left distal lower extremity, which occurred in July 2020  Since then, she has been on rivaroxaban 20 mg daily  She is going to have extensive T6 traction which is scheduled on January 26, 2021  She presents today to obtain surgical clearance from hematology standpoint  She has no bleeding symptoms  She is a active smoker with half pack per day for nearly 40 years  She has no respiratory symptoms  She has no recent weight loss  Her performance status is normal           Interval History:          Objective:     Primary Diagnosis:    1  Unprovoked right distal lower extremity DVT in 2003  2  Unprovoked left distal lower extremity DVT, diagnosed in July 2020       Cancer Staging: Cancer Staging  No matching staging information was found for the patient  Previous Hematologic/ Oncologic Treatment:         Current Hematologic/ Oncologic Treatment:      Rivaroxaban 20 mg daily  Disease Status:     NA    Test Results:    Pathology:        Radiology:        Laboratory:    CBC and BMP are unremarkable  Physical Exam:      General Appearance:    Alert, oriented        Eyes:    PERRL   Ears:    Normal external ear canals, both ears   Nose:   Nares normal, septum midline   Throat:   Mucosa moist  Pharynx without injection  Neck:   Supple       Lungs:     Clear to auscultation bilaterally   Chest Wall:    No tenderness or deformity    Heart:    Regular rate and rhythm       Abdomen:     Soft, non-tender, bowel sounds +, no organomegaly           Extremities:   Extremities no cyanosis or edema       Skin:   no rash or icterus  Lymph nodes:   Cervical, supraclavicular, and axillary nodes normal   Neurologic:   CNII-XII intact, normal strength, sensation and reflexes     Throughout          Breast exam:   NA         ROS: Review of Systems   All other systems reviewed and are negative  Imaging: No results found        Labs:   Lab Results   Component Value Date    WBC 8 20 07/14/2020    HGB 11 2 (L) 07/14/2020    HCT 36 2 07/14/2020     (H) 07/14/2020     07/14/2020     Lab Results   Component Value Date     01/06/2016    K 3 5 07/14/2020     07/14/2020    CO2 24 07/14/2020    ANIONGAP 6 01/06/2016    BUN 21 07/14/2020    CREATININE 1 04 07/14/2020    GLUCOSE 101 01/06/2016    GLUF 136 (H) 07/03/2019    CALCIUM 8 2 (L) 07/14/2020    AST 13 10/03/2019    ALT 14 10/03/2019    ALKPHOS 134 (H) 10/03/2019    PROT 8 5 (H) 09/11/2015    BILITOT 0 79 09/11/2015    EGFR 58 07/14/2020         Lab Results   Component Value Date    IRON 64 07/27/2015    TIBC 284 07/27/2015    FERRITIN 87 5 07/27/2015       Lab Results   Component Value Date    NULNZUHQ32 452 07/27/2015 Lab Results   Component Value Date    FOLATE 9 0 07/27/2015           Vital Sign:    Body surface area is 2 13 meters squared  Wt Readings from Last 3 Encounters:   01/08/21 97 1 kg (214 lb)   10/30/20 94 3 kg (208 lb)   07/28/20 92 1 kg (203 lb)        Temp Readings from Last 3 Encounters:   01/08/21 98 3 °F (36 8 °C) (Tympanic Core)   10/30/20 (!) 97 4 °F (36 3 °C)   07/28/20 (!) 97 °F (36 1 °C)        BP Readings from Last 3 Encounters:   01/08/21 150/78   10/30/20 148/98   07/28/20 160/82         Pulse Readings from Last 3 Encounters:   01/08/21 93   07/27/20 76   07/17/20 85     @LASTSAO2(3)@    Active Problems:   Patient Active Problem List   Diagnosis    Mixed hyperlipidemia    Essential hypertension    Chronic gout 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 tophus    Acquired hypothyroidism    Nummular eczema    Overweight (BMI 25 0-29  9)    Screening for breast cancer    Tobacco abuse    Right-sided lacunar infarction ([facility] [address] )    History of CVA (cerebrovascular accident)    Left ovarian cyst    Other proteinuria    Compression fracture of L1 lumbar vertebra (HCC)    Acute deep vein thrombosis (DVT) of calf muscle vein of left lower extremity (HCC)    Adjustment insomnia    Recurrent deep vein thrombosis (DVT) (HCC)       Past Medical History:   Past Medical History:   Diagnosis Date    Acute renal failure (ARF) (HCC)     Anemia     Asthma     Atopic dermatitis     Chronic kidney disease     Stage 3    Colon polyps     Diabetes mellitus (HCC)     type 2    Disease of thyroid gland     hypothyroidism    Diverticulosis     Edema of both legs     Gout     H/O colonoscopy     H/O mammogram     Hemorrhoids     Hyperlipidemia     Hypertension     Nephrosclerosis     Pain in unspecified foot     Peripheral neuropathy     Phlebitis     Venous stasis dermatitis of both lower extremities     Wrist joint pain     LEFT       Surgical History:   Past Surgical History: Procedure Laterality Date    CERVIX SURGERY      COLONOSCOPY N/A 5/31/2016    Procedure: COLONOSCOPY;  Surgeon: Connie Vang MD;  Location: [facility]; Service:     VEIN SURGERY         Family History:    Family History   Problem Relation Age of Onset    Diabetes Mother         Type 2 as per allscripts    Heart attack Father     Diabetes type II Sister     Hypertension Other        Cancer-related family history is not on file      Social History:   Social History     Socioeconomic History    Marital status: /Civil Union     Spouse name: Not on file    Number of children: Not on file    Years of education: Not on file    Highest education level: Not on file   Occupational History    Not on file   Social Needs    Financial resource strain: Not on file    Food insecurity     Worry: Not on file     Inability: Not on file   [employer] needs     Medical: Not on file     Non-medical: Not on file   Tobacco Use    Smoking status: Current Every Day Smoker     Packs/day: 0 25     Years: 15 00     Pack years: 3 75     Types: Cigarettes    Smokeless tobacco: Never Used    Tobacco comment: 5 cigarettes a day   Substance and Sexual Activity    Alcohol use: Yes     Frequency: Monthly or less     Comment: social    Drug use: No    Sexual activity: Yes     Partners: Male     Birth control/protection: Post-menopausal   Lifestyle    Physical activity     Days per week: Not on file     Minutes per session: Not on file    Stress: Not on file   Relationships    Social connections     Talks on phone: Not on file     Gets together: Not on file     Attends Alevism service: Not on file     Active member of club or organization: Not on file     Attends meetings of clubs or organizations: Not on file     Relationship status: Not on file    Intimate partner violence     Fear of current or ex partner: Not on file     Emotionally abused: Not on file     Physically abused: Not on file     Forced sexual activity: Not on file   Other Topics Concern    Not on file   Social History Narrative    Caffeine use    Daily caffeine consumption, 1 serving a day    Uses safety equipment- seatbelts       Current Medications:   Current Outpatient Medications   Medication Sig Dispense Refill    atorvastatin (LIPITOR) 40 mg tablet Take 1 tablet (40 mg total) by mouth daily with dinner 90 tablet 1    furosemide (LASIX) 40 mg tablet Take 1 tablet (40 mg total) by mouth daily 90 tablet 1    gabapentin (NEURONTIN) 400 mg capsule Take 1 capsule (400 mg total) by mouth 3 (three) times a day 270 capsule 1    hydrALAZINE (APRESOLINE) 100 MG tablet Take 1 tablet (100 mg total) by mouth 3 (three) times a day 90 tablet 1    levothyroxine 137 mcg tablet Take 1 tablet (137 mcg total) by mouth daily 90 tablet 0    lidocaine (LIDODERM) 5 % Apply 1 patch topically daily Remove & Discard patch within 12 hours or as directed by MD 30 patch 0    methocarbamol (ROBAXIN) 500 mg tablet Take 1 tablet (500 mg total) by mouth 2 (two) times a day 10 tablet 0    methocarbamol (ROBAXIN) 750 mg tablet Take 1 tablet (750 mg total) by mouth 3 (three) times a day as needed for muscle spasms 42 tablet 0    NIFEdipine ER (ADALAT CC) 60 MG 24 hr tablet Take 1 tablet (60 mg total) by mouth daily 90 tablet 0    rivaroxaban (XARELTO) 20 mg tablet Take 1 tablet (20 mg total) by mouth daily with breakfast 30 tablet 1    triamcinolone (KENALOG) 0 1 % cream Apply topically 2 (two) times a day 45 g 2    Ventolin  (90 Base) MCG/ACT inhaler Inhale 2 puffs every 6 (six) hours as needed for wheezing 18 g 5    zolpidem (AMBIEN) 5 mg tablet Take 1 tablet (5 mg total) by mouth daily at bedtime as needed for sleep 30 tablet 0     No current facility-administered medications for this visit  Allergies:    Allergies   Allergen Reactions    Neosporin [Neomycin-Bacitracin Zn-Polymyx]     Niacin And Related     Shellfish-Derived Products      seafood

## 2021-01-08 NOTE — LETTER
January 8, 2021     Vikas Oliva DO  [facility]  [address]    Patient: Jim Bonilla   YOB: 1955   Date of Visit: 1/8/2021       Dear Dr Syeda Walker:    Thank you for referring Amber Zamarripa to me for evaluation  Below are my notes for this consultation  If you have questions, please do not hesitate to call me  I look forward to following your patient along with you  Sincerely,        Rossana Chase MD        CC: Darline Cheadle, BERNARDO Chase MD  1/8/2021  2:43 PM  Sign when Signing Visit  Hematology / Oncology Outpatient Consult Note    Jim Bonilla 63 y o  female [id number]         Date:  1/8/2021    Assessment / Plan:  A 44-year-old postmenopausal woman who has 2 episode of unprovoked distal lower extremity DVT on each side, 1st in 2003 and 2nd in July 2020  Since the 2nd unprovoked DVT, she has been on rivaroxaban 20 mg daily  She is going to have extensive teeth reduction by Dr Darline Cheadle on January 26, 2021  She presents today to have surgical clearance from hematology standpoint  I recommended her to hold rivaroxaban for 2 days prior to this surgery  She may resume rivaroxaban on 1 postoperative day assuming that she has no active bleeding  Bridging is not indicated  With above condition, she is cleared for surgery from hematology standpoint  She is in agreement with my recommendation  Regarding her long-term anticoagulation, I recommended her to be on indefinite anticoagulation since she had 2 episode of unprovoked DVT  She is in agreement  Subjective:     HPI:  A 44-year-old postmenopausal woman who had history of unprovoked right distal lower extremity DVT, diagnosed in 2003  She was treated with 6 months of warfarin  She had another episode of unprovoked DVT in her left distal lower extremity, which occurred in July 2020  Since then, she has been on rivaroxaban 20 mg daily    She is going to have extensive T6 traction which is scheduled on January 26, 2021  She presents today to obtain surgical clearance from hematology standpoint  She has no bleeding symptoms  She is a active smoker with half pack per day for nearly 40 years  She has no respiratory symptoms  She has no recent weight loss  Her performance status is normal           Interval History:          Objective:     Primary Diagnosis:    1  Unprovoked right distal lower extremity DVT in 2003  2  Unprovoked left distal lower extremity DVT, diagnosed in July 2020  Cancer Staging:  Cancer Staging  No matching staging information was found for the patient  Previous Hematologic/ Oncologic Treatment:         Current Hematologic/ Oncologic Treatment:      Rivaroxaban 20 mg daily  Disease Status:     NA    Test Results:    Pathology:        Radiology:        Laboratory:    CBC and BMP are unremarkable  Physical Exam:      General Appearance:    Alert, oriented        Eyes:    PERRL   Ears:    Normal external ear canals, both ears   Nose:   Nares normal, septum midline   Throat:   Mucosa moist  Pharynx without injection  Neck:   Supple       Lungs:     Clear to auscultation bilaterally   Chest Wall:    No tenderness or deformity    Heart:    Regular rate and rhythm       Abdomen:     Soft, non-tender, bowel sounds +, no organomegaly           Extremities:   Extremities no cyanosis or edema       Skin:   no rash or icterus  Lymph nodes:   Cervical, supraclavicular, and axillary nodes normal   Neurologic:   CNII-XII intact, normal strength, sensation and reflexes     Throughout          Breast exam:   NA         ROS: Review of Systems   All other systems reviewed and are negative  Imaging: No results found        Labs:   Lab Results   Component Value Date    WBC 8 20 07/14/2020    HGB 11 2 (L) 07/14/2020    HCT 36 2 07/14/2020     (H) 07/14/2020     07/14/2020     Lab Results   Component Value Date    NA 143 01/06/2016    K 3 5 07/14/2020     07/14/2020    CO2 24 07/14/2020    ANIONGAP 6 01/06/2016    BUN 21 07/14/2020    CREATININE 1 04 07/14/2020    GLUCOSE 101 01/06/2016    GLUF 136 (H) 07/03/2019    CALCIUM 8 2 (L) 07/14/2020    AST 13 10/03/2019    ALT 14 10/03/2019    ALKPHOS 134 (H) 10/03/2019    PROT 8 5 (H) 09/11/2015    BILITOT 0 79 09/11/2015    EGFR 58 07/14/2020         Lab Results   Component Value Date    IRON 64 07/27/2015    TIBC 284 07/27/2015    FERRITIN 87 5 07/27/2015       Lab Results   Component Value Date    [id number] 452 07/27/2015       Lab Results   Component Value Date    FOLATE 9 0 07/27/2015           Vital Sign:    Body surface area is 2 13 meters squared  Wt Readings from Last 3 Encounters:   01/08/21 97 1 kg (214 lb)   10/30/20 94 3 kg (208 lb)   07/28/20 92 1 kg (203 lb)        Temp Readings from Last 3 Encounters:   01/08/21 98 3 °F (36 8 °C) (Tympanic Core)   10/30/20 (!) 97 4 °F (36 3 °C)   07/28/20 (!) 97 °F (36 1 °C)        BP Readings from Last 3 Encounters:   01/08/21 150/78   10/30/20 148/98   07/28/20 160/82         Pulse Readings from Last 3 Encounters:   01/08/21 93   07/27/20 76   07/17/20 85     @LASTSAO2(3)@    Active Problems:   Patient Active Problem List   Diagnosis    Mixed hyperlipidemia    Essential hypertension    Chronic gout 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 tophus    Acquired hypothyroidism    Nummular eczema    Overweight (BMI 25 0-29  9)    Screening for breast cancer    Tobacco abuse    Right-sided lacunar infarction ([facility] [address]    History of CVA (cerebrovascular accident)    Left ovarian cyst    Other proteinuria    Compression fracture of L1 lumbar vertebra (HCC)    Acute deep vein thrombosis (DVT) of calf muscle vein of left lower extremity (HCC)    Adjustment insomnia    Recurrent deep vein thrombosis (DVT) (HCC)       Past Medical History:   Past Medical History:   Diagnosis Date    Acute renal failure (ARF) ([facility] [address] )  Anemia     Asthma     Atopic dermatitis     Chronic kidney disease     Stage 3    Colon polyps     Diabetes mellitus ([facility] [address] )     type 2    Disease of thyroid gland     hypothyroidism    Diverticulosis     Edema of both legs     Gout     H/O colonoscopy     H/O mammogram     Hemorrhoids     Hyperlipidemia     Hypertension     Nephrosclerosis     Pain in unspecified foot     Peripheral neuropathy     Phlebitis     Venous stasis dermatitis of both lower extremities     Wrist joint pain     LEFT       Surgical History:   Past Surgical History:   Procedure Laterality Date    CERVIX SURGERY      COLONOSCOPY N/A 5/31/2016    Procedure: COLONOSCOPY;  Surgeon: Wolfgang Aceves MD;  Location: [facility]; Service:     VEIN SURGERY         Family History:    Family History   Problem Relation Age of Onset    Diabetes Mother         Type 2 as per allscripts    Heart attack Father     Diabetes type II Sister     Hypertension Other        Cancer-related family history is not on file      Social History:   Social History     Socioeconomic History    Marital status: /Civil Union     Spouse name: Not on file    Number of children: Not on file    Years of education: Not on file    Highest education level: Not on file   Occupational History    Not on file   Social Needs    Financial resource strain: Not on file    Food insecurity     Worry: Not on file     Inability: Not on file   [employer] needs     Medical: Not on file     Non-medical: Not on file   Tobacco Use    Smoking status: Current Every Day Smoker     Packs/day: 0 25     Years: 15 00     Pack years: 3 75     Types: Cigarettes    Smokeless tobacco: Never Used    Tobacco comment: 5 cigarettes a day   Substance and Sexual Activity    Alcohol use: Yes     Frequency: Monthly or less     Comment: social    Drug use: No    Sexual activity: Yes     Partners: Male     Birth control/protection: Post-menopausal   Lifestyle    Physical activity     Days per week: Not on file     Minutes per session: Not on file    Stress: Not on file   Relationships    Social connections     Talks on phone: Not on file     Gets together: Not on file     Attends Latter day service: Not on file     Active member of club or organization: Not on file     Attends meetings of clubs or organizations: Not on file     Relationship status: Not on file    Intimate partner violence     Fear of current or ex partner: Not on file     Emotionally abused: Not on file     Physically abused: Not on file     Forced sexual activity: Not on file   Other Topics Concern    Not on file   Social History Narrative    Caffeine use    Daily caffeine consumption, 1 serving a day    Uses safety equipment- seatbelts       Current Medications:   Current Outpatient Medications   Medication Sig Dispense Refill    atorvastatin (LIPITOR) 40 mg tablet Take 1 tablet (40 mg total) by mouth daily with dinner 90 tablet 1    furosemide (LASIX) 40 mg tablet Take 1 tablet (40 mg total) by mouth daily 90 tablet 1    gabapentin (NEURONTIN) 400 mg capsule Take 1 capsule (400 mg total) by mouth 3 (three) times a day 270 capsule 1    hydrALAZINE (APRESOLINE) 100 MG tablet Take 1 tablet (100 mg total) by mouth 3 (three) times a day 90 tablet 1    levothyroxine 137 mcg tablet Take 1 tablet (137 mcg total) by mouth daily 90 tablet 0    lidocaine (LIDODERM) 5 % Apply 1 patch topically daily Remove & Discard patch within 12 hours or as directed by MD 30 patch 0    methocarbamol (ROBAXIN) 500 mg tablet Take 1 tablet (500 mg total) by mouth 2 (two) times a day 10 tablet 0    methocarbamol (ROBAXIN) 750 mg tablet Take 1 tablet (750 mg total) by mouth 3 (three) times a day as needed for muscle spasms 42 tablet 0    NIFEdipine ER (ADALAT CC) 60 MG 24 hr tablet Take 1 tablet (60 mg total) by mouth daily 90 tablet 0    rivaroxaban (XARELTO) 20 mg tablet Take 1 tablet (20 mg total) by mouth daily with breakfast 30 tablet 1    triamcinolone (KENALOG) 0 1 % cream Apply topically 2 (two) times a day 45 g 2    Ventolin  (90 Base) MCG/ACT inhaler Inhale 2 puffs every 6 (six) hours as needed for wheezing 18 g 5    zolpidem (AMBIEN) 5 mg tablet Take 1 tablet (5 mg total) by mouth daily at bedtime as needed for sleep 30 tablet 0     No current facility-administered medications for this visit  Allergies:    Allergies   Allergen Reactions    Neosporin [Neomycin-Bacitracin Zn-Polymyx]     Niacin And Related     Shellfish-Derived Products      seafood

## 2021-01-29 ENCOUNTER — OFFICE VISIT (OUTPATIENT)
Dept: FAMILY MEDICINE CLINIC | Facility: CLINIC | Age: 66
End: 2021-01-29
Payer: COMMERCIAL

## 2021-01-29 VITALS
DIASTOLIC BLOOD PRESSURE: 80 MMHG | TEMPERATURE: 97.9 F | WEIGHT: 212 LBS | BODY MASS INDEX: 31.4 KG/M2 | SYSTOLIC BLOOD PRESSURE: 132 MMHG | HEIGHT: 69 IN

## 2021-01-29 DIAGNOSIS — Z72.0 TOBACCO ABUSE: Chronic | ICD-10-CM

## 2021-01-29 DIAGNOSIS — I10 ESSENTIAL HYPERTENSION: Primary | ICD-10-CM

## 2021-01-29 DIAGNOSIS — I82.409 RECURRENT DEEP VEIN THROMBOSIS (DVT) (HCC): ICD-10-CM

## 2021-01-29 DIAGNOSIS — G60.9 IDIOPATHIC PERIPHERAL NEUROPATHY: ICD-10-CM

## 2021-01-29 DIAGNOSIS — E78.2 MIXED HYPERLIPIDEMIA: ICD-10-CM

## 2021-01-29 DIAGNOSIS — Z86.73 HISTORY OF CVA (CEREBROVASCULAR ACCIDENT): ICD-10-CM

## 2021-01-29 DIAGNOSIS — Z12.31 ENCOUNTER FOR SCREENING MAMMOGRAM FOR MALIGNANT NEOPLASM OF BREAST: ICD-10-CM

## 2021-01-29 DIAGNOSIS — E03.9 ACQUIRED HYPOTHYROIDISM: ICD-10-CM

## 2021-01-29 DIAGNOSIS — E66.09 CLASS 1 OBESITY DUE TO EXCESS CALORIES WITH SERIOUS COMORBIDITY AND BODY MASS INDEX (BMI) OF 31.0 TO 31.9 IN ADULT: ICD-10-CM

## 2021-01-29 DIAGNOSIS — N83.202 LEFT OVARIAN CYST: ICD-10-CM

## 2021-01-29 DIAGNOSIS — Z79.899 MEDICATION DOSE CHANGED: ICD-10-CM

## 2021-01-29 DIAGNOSIS — J45.40 MODERATE PERSISTENT ASTHMA WITHOUT COMPLICATION: ICD-10-CM

## 2021-01-29 DIAGNOSIS — I82.462 ACUTE DEEP VEIN THROMBOSIS (DVT) OF CALF MUSCLE VEIN OF LEFT LOWER EXTREMITY (HCC): ICD-10-CM

## 2021-01-29 DIAGNOSIS — R73.03 PREDIABETES: ICD-10-CM

## 2021-01-29 PROBLEM — Z86.718 HISTORY OF RECURRENT DEEP VEIN THROMBOSIS: Status: RESOLVED | Noted: 2020-07-15 | Resolved: 2021-01-29

## 2021-01-29 PROBLEM — I63.81 RIGHT-SIDED LACUNAR INFARCTION (HCC): Status: RESOLVED | Noted: 2019-08-29 | Resolved: 2021-01-29

## 2021-01-29 PROBLEM — F51.02 ADJUSTMENT INSOMNIA: Status: RESOLVED | Noted: 2020-10-30 | Resolved: 2021-01-29

## 2021-01-29 PROBLEM — Z86.718 HISTORY OF RECURRENT DEEP VEIN THROMBOSIS: Status: ACTIVE | Noted: 2020-07-15

## 2021-01-29 LAB
ALBUMIN SERPL-MCNC: 4.2 G/DL (ref 3.6–5.1)
ALBUMIN/GLOB SERPL: 1.4 (CALC) (ref 1–2.5)
ALP SERPL-CCNC: 119 U/L (ref 37–153)
ALT SERPL-CCNC: 18 U/L (ref 6–29)
AST SERPL-CCNC: 19 U/L (ref 10–35)
BILIRUB SERPL-MCNC: 0.4 MG/DL (ref 0.2–1.2)
BUN SERPL-MCNC: 17 MG/DL (ref 7–25)
BUN/CREAT SERPL: 17 (CALC) (ref 6–22)
CALCIUM SERPL-MCNC: 9.1 MG/DL (ref 8.6–10.4)
CHLORIDE SERPL-SCNC: 105 MMOL/L (ref 98–110)
CHOLEST SERPL-MCNC: 154 MG/DL
CHOLEST/HDLC SERPL: 2.4 (CALC)
CO2 SERPL-SCNC: 26 MMOL/L (ref 20–32)
CREAT SERPL-MCNC: 1.03 MG/DL (ref 0.5–0.99)
GLOBULIN SER CALC-MCNC: 3.1 G/DL (CALC) (ref 1.9–3.7)
GLUCOSE SERPL-MCNC: 111 MG/DL (ref 65–99)
HDLC SERPL-MCNC: 63 MG/DL
LDLC SERPL CALC-MCNC: 68 MG/DL (CALC)
NONHDLC SERPL-MCNC: 91 MG/DL (CALC)
POTASSIUM SERPL-SCNC: 3.9 MMOL/L (ref 3.5–5.3)
PROT SERPL-MCNC: 7.3 G/DL (ref 6.1–8.1)
SL AMB EGFR AFRICAN AMERICAN: 67 ML/MIN/1.73M2
SL AMB EGFR NON AFRICAN AMERICAN: 58 ML/MIN/1.73M2
SODIUM SERPL-SCNC: 139 MMOL/L (ref 135–146)
T4 SERPL-MCNC: 5.5 MCG/DL (ref 5.1–11.9)
TRIGL SERPL-MCNC: 152 MG/DL
TSH SERPL-ACNC: 27.23 MIU/L (ref 0.4–4.5)

## 2021-01-29 PROCEDURE — 99214 OFFICE O/P EST MOD 30 MIN: CPT | Performed by: FAMILY MEDICINE

## 2021-01-29 PROCEDURE — 3008F BODY MASS INDEX DOCD: CPT | Performed by: FAMILY MEDICINE

## 2021-01-29 RX ORDER — GABAPENTIN 400 MG/1
400 CAPSULE ORAL 3 TIMES DAILY
Qty: 270 CAPSULE | Refills: 1 | Status: SHIPPED | OUTPATIENT
Start: 2021-01-29 | End: 2021-09-10

## 2021-01-29 RX ORDER — HYDRALAZINE HYDROCHLORIDE 100 MG/1
100 TABLET, FILM COATED ORAL 3 TIMES DAILY
Qty: 270 TABLET | Refills: 1 | Status: SHIPPED | OUTPATIENT
Start: 2021-01-29 | End: 2021-09-10

## 2021-01-29 RX ORDER — NIFEDIPINE 60 MG/1
60 TABLET, FILM COATED, EXTENDED RELEASE ORAL DAILY
Qty: 90 TABLET | Refills: 0 | Status: SHIPPED | OUTPATIENT
Start: 2021-01-29 | End: 2021-05-14 | Stop reason: SDUPTHER

## 2021-01-29 RX ORDER — FUROSEMIDE 40 MG/1
40 TABLET ORAL DAILY
Qty: 90 TABLET | Refills: 1 | Status: SHIPPED | OUTPATIENT
Start: 2021-01-29 | End: 2021-06-15

## 2021-01-29 RX ORDER — ATORVASTATIN CALCIUM 40 MG/1
40 TABLET, FILM COATED ORAL
Qty: 90 TABLET | Refills: 1 | Status: SHIPPED | OUTPATIENT
Start: 2021-01-29 | End: 2021-06-23 | Stop reason: SDUPTHER

## 2021-01-29 RX ORDER — LEVOTHYROXINE SODIUM 137 UG/1
137 TABLET ORAL DAILY
Qty: 90 TABLET | Refills: 0 | Status: SHIPPED | OUTPATIENT
Start: 2021-01-29 | End: 2022-01-21 | Stop reason: SDUPTHER

## 2021-01-29 NOTE — ASSESSMENT & PLAN NOTE
Importance of blood pressure controle was reviewed with patient Symptoms of stroke were also reviewed with patient

## 2021-01-29 NOTE — PROGRESS NOTES
Assessment/Plan:    Acquired hypothyroidism  Patient misunderstood about the thyroid medicaton and actually stopped it Patient will resume and recheck labs in 3 months    Class 1 obesity due to excess calories with serious comorbidity and body mass index (BMI) of 31 0 to 31 9 in adult  Patient has gained weight However may be due in part to being off the thyroid medication  She will resume medication and see me in May     Essential hypertension  BP is good on medication Patient to conitnue current meds and follwoup in May    History of CVA (cerebrovascular accident)  Importance of blood pressure controle was reviewed with patient Symptoms of stroke were also reviewed with patient     Left ovarian cyst  Patient needs to get the [address] done that GYN ordered    Mixed hyperlipidemia  Lipids were at goal continue atorvastatin     Recurrent deep vein thrombosis (DVT) ([facility] [address] )  Reviewed the consult with hematology Patient to continue with the blood thinner     Screening for breast cancer  GYN ordered mammogram Patient to schedule    Tobacco abuse  Patient unwilling to quit at this time she is trying to cut back     Prediabetes  Last a1c was 6 Patient to continue to watch diet Eye exam was done       Diagnoses and all orders for this visit:    Essential hypertension  -     furosemide (LASIX) 40 mg tablet; Take 1 tablet (40 mg total) by mouth daily  -     hydrALAZINE (APRESOLINE) 100 MG tablet; Take 1 tablet (100 mg total) by mouth 3 (three) times a day  -     NIFEdipine ER (ADALAT CC) 60 MG 24 hr tablet; Take 1 tablet (60 mg total) by mouth daily    Acquired hypothyroidism  -     levothyroxine 137 mcg tablet; Take 1 tablet (137 mcg total) by mouth daily  -     TSH, 3rd generation with Free T4 reflex; Future    Recurrent deep vein thrombosis (DVT) (HCC)    History of CVA (cerebrovascular accident)    Mixed hyperlipidemia  -     atorvastatin (LIPITOR) 40 mg tablet;  Take 1 tablet (40 mg total) by mouth daily with dinner    Class 1 obesity due to excess calories with serious comorbidity and body mass index (BMI) of 31 0 to 31 9 in adult    Idiopathic peripheral neuropathy  -     gabapentin (NEURONTIN) 400 mg capsule; Take 1 capsule (400 mg total) by mouth 3 (three) times a day    Acute deep vein thrombosis (DVT) of calf muscle vein of left lower extremity (HCC)  -     rivaroxaban (XARELTO) 20 mg tablet; Take 1 tablet (20 mg total) by mouth daily with breakfast    Prediabetes  -     Hemoglobin A1C; Future    Medication dose changed  -     TSH, 3rd generation with Free T4 reflex; Future    Moderate persistent asthma without complication  -     Ventolin  (90 Base) MCG/ACT inhaler; Inhale 2 puffs every 6 (six) hours as needed for wheezing    Tobacco abuse    Left ovarian cyst    Encounter for screening mammogram for malignant neoplasm of breast          Subjective:   Chief Complaint   Patient presents with    Hypertension    Hypothyroidism    Hyperlipidemia          Patient ID: Jorge Schafer is a 63 y o  female  Patient is here for follwoup of hypertension hyperlipidemia hypothyroidism her weight recurrent DVT and also her history of stroke and her prediabetes Toshia had dental procedure and teeth removed Zara had her hematology consult and will continue on the blod thinner Patient BP is good Her lipids are at goal She had stopped her levothyroxine entirely by mistake thus TSH is very cole This is also likely contributing to the weight gain Patient is still smoking at times Patient is trying to quit with patches Patient has not had the pelvic US and mammogram done that GYN ordered       The following portions of the patient's history were reviewed and updated as appropriate: allergies, current medications, past family history, past medical history, past social history, past surgical history and problem list     Review of Systems   Constitutional: Positive for unexpected weight change  Negative for fatigue and fever     HENT: Positive for dental problem  Negative for congestion, sinus pain and trouble swallowing  Had all her teeth pulled Patient awaiting dentures   Eyes: Negative for discharge and visual disturbance  Respiratory: Negative for cough, chest tightness, shortness of breath and wheezing  Cardiovascular: Negative for chest pain, palpitations and leg swelling  Gastrointestinal: Negative for abdominal pain, blood in stool, constipation, diarrhea, nausea and vomiting  Genitourinary: Negative for difficulty urinating, dysuria, frequency and hematuria  Musculoskeletal: Negative for arthralgias, gait problem and joint swelling  Skin: Negative for rash and wound  Allergic/Immunologic: Negative for environmental allergies and food allergies  Neurological: Negative for dizziness, syncope, weakness, numbness and headaches  Hematological: Negative for adenopathy  Does not bruise/bleed easily  Psychiatric/Behavioral: Negative for confusion, decreased concentration and sleep disturbance  The patient is not nervous/anxious  Objective:      /80   Temp 97 9 °F (36 6 °C)   Ht 5' 9" (1 753 m)   Wt 96 2 kg (212 lb)   BMI 31 31 kg/m²          Physical Exam  Vitals signs and nursing note reviewed  Constitutional:       Appearance: She is well-developed  She is obese  HENT:      Head: Normocephalic and atraumatic  Right Ear: Hearing, tympanic membrane and external ear normal       Left Ear: Hearing, tympanic membrane and external ear normal    Eyes:      Conjunctiva/sclera: Conjunctivae normal       Pupils: Pupils are equal, round, and reactive to light  Neck:      Musculoskeletal: Neck supple  Thyroid: No thyromegaly  Cardiovascular:      Rate and Rhythm: Normal rate and regular rhythm  Pulses: Normal pulses  Heart sounds: Normal heart sounds  Pulmonary:      Effort: Pulmonary effort is normal       Breath sounds: Normal breath sounds  No wheezing or rales     Abdominal: General: Bowel sounds are normal  There is no distension  Palpations: Abdomen is soft  Tenderness: There is no abdominal tenderness  Musculoskeletal:         General: No tenderness  Lymphadenopathy:      Cervical: No cervical adenopathy  Skin:     General: Skin is warm and dry  Findings: No rash  Neurological:      General: No focal deficit present  Mental Status: She is alert and oriented to person, place, and time  Cranial Nerves: No cranial nerve deficit  Coordination: Coordination normal    Psychiatric:         Mood and Affect: Mood normal          Behavior: Behavior normal          Thought Content:  Thought content normal          Judgment: Judgment normal

## 2021-01-29 NOTE — ASSESSMENT & PLAN NOTE
Patient misunderstood about the thyroid medicaton and actually stopped it Patient will resume and recheck labs in 3 months

## 2021-01-29 NOTE — ASSESSMENT & PLAN NOTE
Patient has gained weight However may be due in part to being off the thyroid medication  She will resume medication and see me in May

## 2021-01-29 NOTE — ASSESSMENT & PLAN NOTE
Patient needs to get the [address] done that GYN ordered

## 2021-01-29 NOTE — PATIENT INSTRUCTIONS
Hypothyroidism   WHAT YOU NEED TO KNOW:   What is hypothyroidism? Hypothyroidism is a condition that develops when the thyroid gland does not make enough thyroid hormone  Thyroid hormones help control body temperature, heart rate, growth, and weight  What causes or increases my risk for hypothyroidism? · A family history of hypothyroidism    · Age 61 years or older or being female    · Autoimmune disease, such as inflammation of your thyroid, or Hashimoto disease    · Thyroid surgery, head or neck radiation therapy, or medicines such as lithium, sedatives, or narcotics    · Thyroid cancer, a goiter, or a viral infection    · Low iodine level    · Down syndrome or Mccray syndrome    What are the signs and symptoms of hypothyroidism? The signs and symptoms may develop slowly, sometimes over several years  · Exhaustion, depression, or irritability    · Sensitivity to cold    · Muscle aches, headaches, weakness, or trouble concentrating    · Dry, flaky skin, brittle nails, or thinning hair    · Recent weight gain without overeating, or constipation    · Heavy or irregular monthly periods    · Puffiness around your eyes or swelling in your hands and feet    · Low heart rate, changes in your blood pressure    How is hypothyroidism diagnosed? Your healthcare provider will ask about your symptoms and what medicines you take  He or she will ask about your medical history and if anyone in your family has hypothyroidism  A blood test will show your thyroid hormone level  How is hypothyroidism treated? Thyroid hormone replacement medicine may bring your thyroid hormone level back to normal  You will need to take this medicine for the rest of your life to control hypothyroidism  It is important to take the medicine every day as directed  You will be given instructions for what to do if you miss a dose  Ask your healthcare provider for more information on other medicines you may need  How can I manage hypothyroidism? · Get more iodine  The thyroid gland uses iodine to work correctly and to make thyroid hormones  Your healthcare provider may tell you to eat foods that are rich in iodine  He or she will tell you how much of these foods to eat  Milk and seafood are good sources of iodine  You may also need iodine supplements  · Keep track of your blood pressure and weight:      ? Check your blood pressure  and write it down as often as directed  It is important to measure your blood pressure on the same arm and in the same position each time  Keep track of your blood pressure readings, along with the date and time you took them  Take this record with you to your followup visits  ? Weigh yourself daily  before breakfast after you urinate  Weight gain may be a sign of extra fluid in your body  Keep track of your daily weights and take the record with you to your followup visits  Call your local emergency number (911 in the [address] [place] [address],[address]) or have someone call if:   · You have sudden chest pain or shortness of breath  · You have a seizure  · You feel like you are going to faint  When should I seek immediate care? · You have diarrhea, tremors, or trouble sleeping  · Your legs, ankles, or feet are swollen  When should I call my doctor? · You have a fever  · You have chills, a cough, or feel weak and achy  · You have pain and swelling in your muscles and joints  · Your skin is itchy, swollen, or you have a rash  · Your signs and symptoms return or get worse, even after treatment  · You have questions or concerns about your condition or care  CARE AGREEMENT:   You have the right to help plan your care  Learn about your health condition and how it may be treated  Discuss treatment options with your healthcare providers to decide what care you want to receive  You always have the right to refuse treatment  The above information is an  only   It is not intended as medical advice for individual conditions or treatments  Talk to your doctor, nurse or pharmacist before following any medical regimen to see if it is safe and effective for you  © Copyright [address] Information is for End User's use only and may not be sold, redistributed or otherwise used for commercial purposes   All illustrations and images included in CareNotes® are the copyrighted property of A D A M , [employer]  or [address] [place] [address]

## 2021-03-09 ENCOUNTER — TELEPHONE (OUTPATIENT)
Dept: FAMILY MEDICINE CLINIC | Facility: CLINIC | Age: 66
End: 2021-03-09

## 2021-03-09 LAB
HBA1C MFR BLD: 6.2 % OF TOTAL HGB
TSH SERPL-ACNC: 2.25 MIU/L (ref 0.4–4.5)

## 2021-03-09 PROCEDURE — 3044F HG A1C LEVEL LT 7.0%: CPT | Performed by: FAMILY MEDICINE

## 2021-03-09 NOTE — TELEPHONE ENCOUNTER
----- Message from Rupert Avalos DO sent at 3/9/2021  9:28 AM EST -----  Call patient her [facility] continue current care and keep followup appt

## 2021-05-12 DIAGNOSIS — L30.0 NUMMULAR ECZEMA: ICD-10-CM

## 2021-05-12 RX ORDER — TRIAMCINOLONE ACETONIDE 1 MG/G
CREAM TOPICAL
Qty: 45 G | Refills: 0 | Status: SHIPPED | OUTPATIENT
Start: 2021-05-12 | End: 2021-06-14

## 2021-05-14 DIAGNOSIS — I10 ESSENTIAL HYPERTENSION: ICD-10-CM

## 2021-05-14 RX ORDER — NIFEDIPINE 60 MG/1
60 TABLET, FILM COATED, EXTENDED RELEASE ORAL DAILY
Qty: 90 TABLET | Refills: 0 | Status: SHIPPED | OUTPATIENT
Start: 2021-05-14 | End: 2021-06-15

## 2021-06-13 DIAGNOSIS — L30.0 NUMMULAR ECZEMA: ICD-10-CM

## 2021-06-14 ENCOUNTER — HOSPITAL ENCOUNTER (EMERGENCY)
Facility: HOSPITAL | Age: 66
Discharge: HOME | End: 2021-06-14
Attending: EMERGENCY MEDICINE
Payer: COMMERCIAL

## 2021-06-14 ENCOUNTER — APPOINTMENT (EMERGENCY)
Dept: NON INVASIVE DIAGNOSTICS | Facility: HOSPITAL | Age: 66
End: 2021-06-14
Payer: COMMERCIAL

## 2021-06-14 ENCOUNTER — APPOINTMENT (EMERGENCY)
Dept: RADIOLOGY | Facility: HOSPITAL | Age: 66
End: 2021-06-14
Payer: COMMERCIAL

## 2021-06-14 VITALS
DIASTOLIC BLOOD PRESSURE: 77 MMHG | HEART RATE: 70 BPM | RESPIRATION RATE: 17 BRPM | TEMPERATURE: 98.4 F | BODY MASS INDEX: 30.77 KG/M2 | OXYGEN SATURATION: 97 % | WEIGHT: 208.34 LBS | SYSTOLIC BLOOD PRESSURE: 158 MMHG
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
## 2021-06-14 DIAGNOSIS — S09.90XA CHI (CLOSED HEAD INJURY): ICD-10-CM

## 2021-06-14 DIAGNOSIS — I80.9 SUPERFICIAL PHLEBITIS: Primary | ICD-10-CM

## 2021-06-14 LAB
ANION GAP SERPL CALCULATED.3IONS-SCNC: 11 MMOL/L (ref 4–13)
APTT PPP: 48 SECONDS (ref 23–37)
BUN SERPL-MCNC: 26 MG/DL (ref 5–25)
CALCIUM SERPL-MCNC: 8.6 MG/DL (ref 8.3–10.1)
CHLORIDE SERPL-SCNC: 109 MMOL/L (ref 100–108)
CO2 SERPL-SCNC: 26 MMOL/L (ref 21–32)
CREAT SERPL-MCNC: 1.25 MG/DL (ref 0.6–1.3)
D DIMER PPP FEU-MCNC: 0.97 UG/ML FEU
GFR SERPL CREATININE-BSD FRML MDRD: 46 ML/MIN/1.73SQ M
GLUCOSE SERPL-MCNC: 106 MG/DL (ref 65–140)
INR PPP: 1.37 (ref 0.84–1.19)
POTASSIUM SERPL-SCNC: 4 MMOL/L (ref 3.5–5.3)
PROTHROMBIN TIME: 16.6 SECONDS (ref 11.6–14.5)
SODIUM SERPL-SCNC: 146 MMOL/L (ref 136–145)

## 2021-06-14 PROCEDURE — 36415 COLL VENOUS BLD VENIPUNCTURE: CPT | Performed by: EMERGENCY MEDICINE

## 2021-06-14 PROCEDURE — 85379 FIBRIN DEGRADATION QUANT: CPT | Performed by: EMERGENCY MEDICINE

## 2021-06-14 PROCEDURE — 93970 EXTREMITY STUDY: CPT

## 2021-06-14 PROCEDURE — 73110 X-RAY EXAM OF WRIST: CPT

## 2021-06-14 PROCEDURE — 80048 BASIC METABOLIC PNL TOTAL CA: CPT | Performed by: EMERGENCY MEDICINE

## 2021-06-14 PROCEDURE — 85730 THROMBOPLASTIN TIME PARTIAL: CPT | Performed by: EMERGENCY MEDICINE

## 2021-06-14 PROCEDURE — 99284 EMERGENCY DEPT VISIT MOD MDM: CPT

## 2021-06-14 PROCEDURE — 85610 PROTHROMBIN TIME: CPT | Performed by: EMERGENCY MEDICINE

## 2021-06-14 PROCEDURE — 99285 EMERGENCY DEPT VISIT HI MDM: CPT | Performed by: EMERGENCY MEDICINE

## 2021-06-14 RX ORDER — ACETAMINOPHEN 325 MG/1
975 TABLET ORAL ONCE
Status: COMPLETED | OUTPATIENT
Start: 2021-06-14 | End: 2021-06-14

## 2021-06-14 RX ORDER — TRIAMCINOLONE ACETONIDE 1 MG/G
CREAM TOPICAL
Qty: 45 G | Refills: 0 | Status: SHIPPED | OUTPATIENT
Start: 2021-06-14

## 2021-06-14 RX ADMIN — ACETAMINOPHEN 975 MG: 325 TABLET, FILM COATED ORAL at 19:36

## 2021-06-14 NOTE — Clinical Note
Luz Maria Izquierdo was seen and treated in our emergency department on 6/14/2021  Diagnosis: Head injury, leg swelling    Ritchie Jang  may return to work on return date  She may return on this date: 06/21/2021         If you have any questions or concerns, please don't hesitate to call        Carley Winters DO    ______________________________           _______________          _______________  Hospital Representative                              Date                                Time

## 2021-06-14 NOTE — ED ATTENDING ATTESTATION
6/14/2021  I, Darrel Chaparro MD, saw and evaluated the patient  I have discussed the patient with the resident/non-physician practitioner and agree with the resident's/non-physician practitioner's findings, Plan of Care, and MDM as documented in the resident's/non-physician practitioner's note, except where noted  All available labs and Radiology studies were reviewed  I was present for key portions of any procedure(s) performed by the resident/non-physician practitioner and I was immediately available to provide assistance  At this point I agree with the current assessment done in the Emergency Department  I have conducted an independent evaluation of this p    patient a history and physical is as follows:    62 y/o F presents for evaluation of R wrist pain, L knee pain w/ concern for possible blood clot given hx of prior blood lots, and contusion around L eye  Pt is on xarelto  No eye pain, visual complaints, ha, focal neuro deficits/weakness, other traumatic injuries  10 systems reviewed and otherwise neg  On exam no distress, heent trauam sig for Contusion around L eye only w/o hyphema, visual deficits, and with painless EOMI, nttp over ctl spines, cardiac/lung/abd/pelvis trauma exam wnl, b/l UE trauma wnl except for ttp over r wrist, b/l le trauma exam nl except for hematoma over L knee  MDM: R wrsit pain-will xray to r/o fx    Head injury-informed decision making was used in regards to imaging, pt does not wish to have imaging done at this time and is aware of risks      L knee pain-will do duplex/dimer to r/o dvt  ED Course         Critical Care Time  Procedures

## 2021-06-14 NOTE — ED PROVIDER NOTES
History  Chief Complaint   Patient presents with   [facility] Fall     patient reports fell last tuesday while getting onto bus  hit forehead, and left knee  reports swelling to left eye, left knee pain, and bilateral ankle pain and swelling  63yo female presents for evaluation of left lower extremity swelling, concern for VTE  Patient fell last Tuesday while attempting to get on the bus, belives she tripped over a curb  No LOC but did strike her left forehead and left knee  She ambulated onto the bus and has been ambulatory since, also returned to work  She has hx of VTE x2 and is on xarelto  Has tried compression stocking at night for the swelling, she reports some relief however throughout the day return of swelling  Denies numbness or tingling in lower extremities  Denies chest pain, dypnea  Prior to Admission Medications   Prescriptions Last Dose Informant Patient Reported? Taking?    NIFEdipine ER (ADALAT CC) 60 MG 24 hr tablet   No No   Sig: Take 1 tablet (60 mg total) by mouth daily   Ventolin  (90 Base) MCG/ACT inhaler   No No   Sig: Inhale 2 puffs every 6 (six) hours as needed for wheezing   atorvastatin (LIPITOR) 40 mg tablet   No No   Sig: Take 1 tablet (40 mg total) by mouth daily with dinner   furosemide (LASIX) 40 mg tablet   No No   Sig: Take 1 tablet (40 mg total) by mouth daily   gabapentin (NEURONTIN) 400 mg capsule   No No   Sig: Take 1 capsule (400 mg total) by mouth 3 (three) times a day   hydrALAZINE (APRESOLINE) 100 MG tablet   No No   Sig: Take 1 tablet (100 mg total) by mouth 3 (three) times a day   levothyroxine 137 mcg tablet   No No   Sig: Take 1 tablet (137 mcg total) by mouth daily   lidocaine (LIDODERM) 5 %   No No   Sig: Apply 1 patch topically daily Remove & Discard patch within 12 hours or as directed by MD   methocarbamol (ROBAXIN) 500 mg tablet   No No   Sig: Take 1 tablet (500 mg total) by mouth 2 (two) times a day   methocarbamol (ROBAXIN) 750 mg tablet   No No Sig: Take 1 tablet (750 mg total) by mouth 3 (three) times a day as needed for muscle spasms   rivaroxaban (XARELTO) 20 mg tablet   No No   Sig: Take 1 tablet (20 mg total) by mouth daily with breakfast   triamcinolone (KENALOG) 0 1 % cream   No No   Sig: APPLY  CREAM EXTERNALLY TWICE DAILY   zolpidem (AMBIEN) 5 mg tablet   No No   Sig: Take 1 tablet (5 mg total) by mouth daily at bedtime as needed for sleep      Facility-Administered Medications: None       Past Medical History:   Diagnosis Date    Acute renal failure (ARF) (HCC)     Anemia     Asthma     Atopic dermatitis     Chronic kidney disease     Stage 3    Colon polyps     Diabetes mellitus (HCC)     type 2    Disease of thyroid gland     hypothyroidism    Diverticulosis     Edema of both legs     Gout     H/O colonoscopy     H/O mammogram     Hemorrhoids     Hyperlipidemia     Hypertension     Nephrosclerosis     Pain in unspecified foot     Peripheral neuropathy     Phlebitis     Venous stasis dermatitis of both lower extremities     Wrist joint pain     LEFT       Past Surgical History:   Procedure Laterality Date    CERVIX SURGERY      COLONOSCOPY N/A 5/31/2016    Procedure: COLONOSCOPY;  Surgeon: Yeimy Peña MD;  Location: [facility]; Service:     VEIN SURGERY         Family History   Problem Relation Age of Onset    Diabetes Mother         Type 2 as per allscripts    Heart attack Father     Diabetes type II Sister     Hypertension Other      I have reviewed and agree with the history as documented      E-Cigarette/Vaping    E-Cigarette Use Never User      E-Cigarette/Vaping Substances    Nicotine No     THC No     CBD No      Social History     Tobacco Use    Smoking status: Current Every Day Smoker     Packs/day: 0 25     Years: 15 00     Pack years: 3 75     Types: Cigarettes    Smokeless tobacco: Never Used    Tobacco comment: 5 cigarettes a day   Vaping Use    Vaping Use: Never used   Substance Use Topics  Alcohol use: Yes     Comment: social    Drug use: No        Review of Systems   Constitutional: Negative for appetite change, chills and fever  Eyes: Negative for visual disturbance  Respiratory: Negative for shortness of breath  Cardiovascular: Positive for leg swelling  Negative for chest pain  Gastrointestinal: Negative for abdominal pain, nausea and vomiting  Musculoskeletal: Negative for back pain and neck pain  Skin: Positive for wound  Neurological: Negative for syncope, weakness, numbness and headaches  All other systems reviewed and are negative  Physical Exam  ED Triage Vitals   Temperature Pulse Respirations Blood Pressure SpO2   06/14/21 1433 06/14/21 1433 06/14/21 1433 06/14/21 1433 06/14/21 1433   98 4 °F (36 9 °C) 80 16 154/88 100 %      Temp Source Heart Rate Source Patient Position - Orthostatic VS BP Location FiO2 (%)   06/14/21 1433 06/14/21 1433 06/14/21 1433 06/14/21 1433 --   Oral Monitor Sitting Right arm       Pain Score       06/14/21 1726       9             Orthostatic Vital Signs  Vitals:    06/14/21 1433 06/14/21 1726 06/14/21 1932   BP: 154/88 138/61 158/77   Pulse: 80 71 70   Patient Position - Orthostatic VS: Sitting Lying Lying       Physical Exam  Vitals reviewed  Constitutional:       General: She is not in acute distress  Appearance: Normal appearance  She is not ill-appearing, toxic-appearing or diaphoretic  HENT:      Head: Normocephalic  Comments: Ecchymosis to L periorbital area, small area to R medial periorbital area     Right Ear: Tympanic membrane and external ear normal       Left Ear: Tympanic membrane and external ear normal       Nose: Nose normal       Comments: No septal hematoma     Mouth/Throat:      Mouth: Mucous membranes are moist       Pharynx: No oropharyngeal exudate or posterior oropharyngeal erythema  Eyes:      General:         Right eye: No discharge  Left eye: No discharge        Extraocular Movements: Extraocular movements intact  Pupils: Pupils are equal, round, and reactive to light  Comments: No EOM entrapment, pain   Cardiovascular:      Rate and Rhythm: Normal rate and regular rhythm  Pulmonary:      Effort: Pulmonary effort is normal  No respiratory distress  Breath sounds: Normal breath sounds  Abdominal:      General: There is no distension  Palpations: Abdomen is soft  Tenderness: There is no abdominal tenderness  There is no guarding or rebound  Musculoskeletal:         General: No deformity or signs of injury  Cervical back: No rigidity  Comments: B/l LE swelling with ecchymosis around L knee capsule anteriorly and posteriorly, palpable bulge to posterior leg inferior to knee capsule; moving all extremities, standing and walking without difficulty in room   Skin:     General: Skin is warm  Findings: Bruising present  Neurological:      General: No focal deficit present  Mental Status: She is alert  Mental status is at baseline  Cranial Nerves: No cranial nerve deficit  Sensory: No sensory deficit  Motor: No weakness        Gait: Gait normal          ED Medications  Medications   acetaminophen (TYLENOL) tablet 975 mg (975 mg Oral Given 6/14/21 1936)       Diagnostic Studies  Results Reviewed     Procedure Component Value Units Date/Time    D-dimer, quantitative [[id number]]  (Abnormal) Collected: 06/14/21 1637    Lab Status: Final result Specimen: Blood from Arm, Right Updated: 06/14/21 1703     D-Dimer, Quant 0 97 ug/ml FEU     APTT [[id number]]  (Abnormal) Collected: 06/14/21 1637    Lab Status: Final result Specimen: Blood from Arm, Right Updated: 06/14/21 1701     PTT 48 seconds     Protime-INR [[id number]]  (Abnormal) Collected: 06/14/21 1637    Lab Status: Final result Specimen: Blood from Arm, Right Updated: 06/14/21 1701     Protime 16 6 seconds      INR 0 18    Basic metabolic panel [[id number]]  (Abnormal) Collected: 06/14/21 1637 Lab Status: Final result Specimen: Blood from Arm, Right Updated: 06/14/21 1656     Sodium 146 mmol/L      Potassium 4 0 mmol/L      Chloride 109 mmol/L      CO2 26 mmol/L      ANION GAP 11 mmol/L      BUN 26 mg/dL      Creatinine 1 25 mg/dL      Glucose 106 mg/dL      Calcium 8 6 mg/dL      eGFR 46 ml/min/1 73sq m     Narrative:      [place] guidelines for Chronic Kidney Disease (CKD):     Stage 1 with normal or high GFR (GFR > 90 mL/min/1 73 square meters)    Stage 2 Mild CKD (GFR = 60-89 mL/min/1 73 square meters)    Stage 3A Moderate CKD (GFR = 45-59 mL/min/1 73 square meters)    Stage 3B Moderate CKD (GFR = 30-44 mL/min/1 73 square meters)    Stage 4 Severe CKD (GFR = 15-29 mL/min/1 73 square meters)    Stage 5 End Stage CKD (GFR <15 mL/min/1 73 square meters)  Note: GFR calculation is accurate only with a steady state creatinine                 XR wrist 3+ views RIGHT   ED Interpretation by Rush Weinstein MD (06/14 1654)   Primary reviewed: no acute abnormality      VAS lower limb venous duplex study, complete bilateral    (Results Pending)         Procedures  Procedures      ED Course  ED Course as of Jun 14 2334 Mon Jun 14, 2021   [address] Will keep dimer order   D-Dimer, Quant(!): 0 97   1708 Vascular US tech stating numerous STAT outpatient exams as well, will not able to come to ED readily  Discussion with patient regarding do dimer as a way test for VTE as she is low risk being on anti-coagulation already  Patient accepting of this, however dimer elevated; will keep duplex as ordered  1721 Patient updated to dimer results, accepting of continued testing and wait        1905 TT sent to vascular tech regarding results      Fidel Actis Per tech: superficial phlebitis to LEFT great saphenous in proximal thigh about 6cm from femoral vein, LEFT superficial phlebitis in small saphenous at knee                                SBIRT 20yo+      Most Recent Value   SBIRT (24 yo +)   In order to provide better care to our patients, we are screening all of our patients for alcohol and drug use  Would it be okay to ask you these screening questions? No Filed at: 06/14/2021 1937                [facility]  Number of Diagnoses or Management Options  CHI (closed head injury)  Fall  Superficial phlebitis  Diagnosis management comments: 61-year-old female presents for evaluation of possible VTE after fall last week  Patient states her last DVT happened after an injury, she is maintained on Xarelto  Patient does have evidence facial bruising after head strike  Patient denies loss of consciousness, nausea or vomiting episodes, she has been continuing her daily activities including returning to work  The patient is on Xarelto and has facial bruising, patient does not likely need CT head at this time given injury happened almost a week ago, she has no extraocular entrapment, she is not experiencing severe headaches or other neurological deficit  Discussion was had with patient, she agrees with this  Will obtain duplex LE although unlikley to have acute VTE  Disposition  Final diagnoses:   Fall   CHI (closed head injury)   Superficial phlebitis     Time reflects when diagnosis was documented in both MDM as applicable and the Disposition within this note     Time User Action Codes Description Comment    6/14/2021  7:15 PM Fredy Sun [C92  XXXA] Fall     6/14/2021  7:15 PM [facility] Add [F48 72PQ] CHI (closed head injury)     6/14/2021  7:15 PM [facility] Add [I80 9] Superficial phlebitis     6/14/2021  7:15 PM [facility] Modify [H99  XXXA] Fall     6/14/2021  7:15 PM [facility] Modify [I80 9] Superficial phlebitis       ED Disposition     ED Disposition Condition Date/Time Comment    Discharge Stable Mon Jun 14, 2021  7:15 PM Marlon Sidhu discharge to home/self care              Follow-up Information     Follow up With Specialties Details Why Contact Info    Herlinda Aase DO [facility] Schedule an appointment as soon as possible for a visit   [id number] [facility] [address]  [phone]            Discharge Medication List as of 6/14/2021  7:16 PM      CONTINUE these medications which have NOT CHANGED    Details   atorvastatin (LIPITOR) 40 mg tablet Take 1 tablet (40 mg total) by mouth daily with dinner, Starting Fri 1/29/2021, Normal      furosemide (LASIX) 40 mg tablet Take 1 tablet (40 mg total) by mouth daily, Starting Fri 1/29/2021, Normal      gabapentin (NEURONTIN) 400 mg capsule Take 1 capsule (400 mg total) by mouth 3 (three) times a day, Starting Fri 1/29/2021, Normal      hydrALAZINE (APRESOLINE) 100 MG tablet Take 1 tablet (100 mg total) by mouth 3 (three) times a day, Starting Fri 1/29/2021, Normal      levothyroxine 137 mcg tablet Take 1 tablet (137 mcg total) by mouth daily, Starting Fri 1/29/2021, Normal      lidocaine (LIDODERM) 5 % Apply 1 patch topically daily Remove & Discard patch within 12 hours or as directed by MD, Starting Fri 7/17/2020, Print      !! methocarbamol (ROBAXIN) 500 mg tablet Take 1 tablet (500 mg total) by mouth 2 (two) times a day, Starting Wed 7/8/2020, Print      !! methocarbamol (ROBAXIN) 750 mg tablet Take 1 tablet (750 mg total) by mouth 3 (three) times a day as needed for muscle spasms, Starting Wed 5/20/2020, Normal      NIFEdipine ER (ADALAT CC) 60 MG 24 hr tablet Take 1 tablet (60 mg total) by mouth daily, Starting Fri 5/14/2021, Normal      rivaroxaban (XARELTO) 20 mg tablet Take 1 tablet (20 mg total) by mouth daily with breakfast, Starting Fri 1/29/2021, Normal      triamcinolone (KENALOG) 0 1 % cream APPLY  CREAM EXTERNALLY TWICE DAILY, Normal      Ventolin  (90 Base) MCG/ACT inhaler Inhale 2 puffs every 6 (six) hours as needed for wheezing, Starting Fri 1/29/2021, Normal      zolpidem (AMBIEN) 5 mg tablet Take 1 tablet (5 mg total) by mouth daily at bedtime as needed for sleep, Starting Fri 10/30/2020, Normal       !! - Potential duplicate medications found  Please discuss with provider  No discharge procedures on file  PDMP Review       Value Time User    PDMP Reviewed  Yes 10/30/2020 11:27 AM Samantha Mejia DO           ED Provider  Attending physically available and evaluated Clau Whitlock  I managed the patient along with the ED Attending      Electronically Signed by         Robbi Gallagher DO  06/14/21 5965

## 2021-06-15 DIAGNOSIS — I10 ESSENTIAL HYPERTENSION: ICD-10-CM

## 2021-06-15 PROCEDURE — 93970 EXTREMITY STUDY: CPT | Performed by: SURGERY

## 2021-06-15 RX ORDER — NIFEDIPINE 60 MG/1
TABLET, FILM COATED, EXTENDED RELEASE ORAL
Qty: 90 TABLET | Refills: 0 | Status: SHIPPED | OUTPATIENT
Start: 2021-06-15 | End: 2021-09-10

## 2021-06-15 RX ORDER — FUROSEMIDE 40 MG/1
TABLET ORAL
Qty: 90 TABLET | Refills: 0 | Status: SHIPPED | OUTPATIENT
Start: 2021-06-15 | End: 2022-01-21 | Stop reason: SDUPTHER

## 2021-06-19 ENCOUNTER — APPOINTMENT (EMERGENCY)
Dept: CT IMAGING | Facility: HOSPITAL | Age: 66
End: 2021-06-19
Payer: COMMERCIAL

## 2021-06-19 ENCOUNTER — HOSPITAL ENCOUNTER (EMERGENCY)
Facility: HOSPITAL | Age: 66
Discharge: HOME | End: 2021-06-20
Attending: EMERGENCY MEDICINE | Admitting: EMERGENCY MEDICINE
Payer: COMMERCIAL

## 2021-06-19 DIAGNOSIS — S09.90XA CLOSED HEAD INJURY, INITIAL ENCOUNTER: ICD-10-CM
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
## 2021-06-19 LAB
ANION GAP SERPL CALCULATED.3IONS-SCNC: 14 MMOL/L (ref 4–13)
APTT PPP: 46 SECONDS (ref 23–37)
BASOPHILS # BLD AUTO: 0.05 THOUSANDS/ΜL (ref 0–0.1)
BASOPHILS NFR BLD AUTO: 1 % (ref 0–1)
BUN SERPL-MCNC: 21 MG/DL (ref 5–25)
CALCIUM SERPL-MCNC: 8.2 MG/DL (ref 8.3–10.1)
CHLORIDE SERPL-SCNC: 101 MMOL/L (ref 100–108)
CO2 SERPL-SCNC: 23 MMOL/L (ref 21–32)
CREAT SERPL-MCNC: 1.26 MG/DL (ref 0.6–1.3)
EOSINOPHIL # BLD AUTO: 0.35 THOUSAND/ΜL (ref 0–0.61)
EOSINOPHIL NFR BLD AUTO: 5 % (ref 0–6)
ERYTHROCYTE [DISTWIDTH] IN BLOOD BY AUTOMATED COUNT: 14.8 % (ref 11.6–15.1)
ETHANOL SERPL-MCNC: 254 MG/DL (ref 0–3)
GFR SERPL CREATININE-BSD FRML MDRD: 45 ML/MIN/1.73SQ M
GLUCOSE SERPL-MCNC: 79 MG/DL (ref 65–140)
HCT VFR BLD AUTO: 32.4 % (ref 34.8–46.1)
HGB BLD-MCNC: 9.9 G/DL (ref 11.5–15.4)
IMM GRANULOCYTES # BLD AUTO: 0.04 THOUSAND/UL (ref 0–0.2)
IMM GRANULOCYTES NFR BLD AUTO: 1 % (ref 0–2)
INR PPP: 1.31 (ref 0.84–1.19)
LYMPHOCYTES # BLD AUTO: 1.85 THOUSANDS/ΜL (ref 0.6–4.47)
LYMPHOCYTES NFR BLD AUTO: 27 % (ref 14–44)
MCH RBC QN AUTO: 29 PG (ref 26.8–34.3)
MCHC RBC AUTO-ENTMCNC: 30.6 G/DL (ref 31.4–37.4)
MCV RBC AUTO: 95 FL (ref 82–98)
MONOCYTES # BLD AUTO: 0.71 THOUSAND/ΜL (ref 0.17–1.22)
MONOCYTES NFR BLD AUTO: 11 % (ref 4–12)
NEUTROPHILS # BLD AUTO: 3.77 THOUSANDS/ΜL (ref 1.85–7.62)
NEUTS SEG NFR BLD AUTO: 55 % (ref 43–75)
NRBC BLD AUTO-RTO: 0 /100 WBCS
PLATELET # BLD AUTO: 341 THOUSANDS/UL (ref 149–390)
PMV BLD AUTO: 10.4 FL (ref 8.9–12.7)
POTASSIUM SERPL-SCNC: 3.2 MMOL/L (ref 3.5–5.3)
PROTHROMBIN TIME: 16.1 SECONDS (ref 11.6–14.5)
RBC # BLD AUTO: 3.41 MILLION/UL (ref 3.81–5.12)
SODIUM SERPL-SCNC: 138 MMOL/L (ref 136–145)
WBC # BLD AUTO: 6.77 THOUSAND/UL (ref 4.31–10.16)

## 2021-06-19 PROCEDURE — 72125 CT NECK SPINE W/O DYE: CPT

## 2021-06-19 PROCEDURE — 70450 CT HEAD/BRAIN W/O DYE: CPT

## 2021-06-19 PROCEDURE — 99282 EMERGENCY DEPT VISIT SF MDM: CPT | Performed by: EMERGENCY MEDICINE

## 2021-06-19 PROCEDURE — 36415 COLL VENOUS BLD VENIPUNCTURE: CPT | Performed by: EMERGENCY MEDICINE

## 2021-06-19 PROCEDURE — 85730 THROMBOPLASTIN TIME PARTIAL: CPT | Performed by: EMERGENCY MEDICINE

## 2021-06-19 PROCEDURE — 85025 COMPLETE CBC W/AUTO DIFF WBC: CPT | Performed by: EMERGENCY MEDICINE

## 2021-06-19 PROCEDURE — 99284 EMERGENCY DEPT VISIT MOD MDM: CPT

## 2021-06-19 PROCEDURE — 82077 ASSAY SPEC XCP UR&BREATH IA: CPT | Performed by: EMERGENCY MEDICINE

## 2021-06-19 PROCEDURE — 70486 CT MAXILLOFACIAL W/O DYE: CPT

## 2021-06-19 PROCEDURE — 85610 PROTHROMBIN TIME: CPT | Performed by: EMERGENCY MEDICINE

## 2021-06-19 PROCEDURE — 80048 BASIC METABOLIC PNL TOTAL CA: CPT | Performed by: EMERGENCY MEDICINE

## 2021-06-20 VITALS
RESPIRATION RATE: 18 BRPM | TEMPERATURE: 97.6 F | DIASTOLIC BLOOD PRESSURE: 70 MMHG | BODY MASS INDEX: 30.38 KG/M2 | HEART RATE: 70 BPM | WEIGHT: 205.69 LBS | SYSTOLIC BLOOD PRESSURE: 115 MMHG | OXYGEN SATURATION: 99 %

## 2021-06-20 NOTE — ED NOTES
Assisted pt to commode and supervised d/t fall risk  Pt verbally aggressive with RN throughout process   Once finished, pt assisted back into bed and given purse per request       Damaris Manzanares RN  06/19/21 1015

## 2021-06-20 NOTE — ED NOTES
Pt states she has no one to pick her up  Informed pt she would not be allowed to leave ED without a ride until she is sober  Pt upset but agreeable        Kian Velasquez RN  06/19/21 1110

## 2021-06-20 NOTE — ED NOTES
arrives to p/u pt  Pt exits dept with  ambulatory with steady gait        Kian Velasquez RN  06/20/21 5468

## 2021-06-20 NOTE — ED PROVIDER NOTES
History  Chief Complaint   Patient presents with   Kwadwo Monique Fall     arrived via ems from local bar after falling  states she has been drinking  ems reports possible loc and that pt is on xarelto     Pt is a 63year old female with a PMH anemia, hypertension, hyperlipidemia, DVT, alcoholism presenting with fall  Pt is severely intoxicated and unable to give accurate history  Admits that she did fall but is not able to tell me how she fell  EMS brought pt from a local bar  Pt is on Xarelto and there was possible LOC per EMS  Pt has what appears to be healing ecchymosis of the left sided of her face and a hematoma over her left eyebrow  No vomiting noted  Pt is alert  History provided by:  Patient   used: No    Fall  Mechanism of injury: fall    Injury location:  Head/neck and face  Head/neck injury location:  Head  Facial injury location:  Face  Incident location: bar  Arrived directly from scene: yes    Fall:     Point of impact:  Head    Entrapped after fall: no    Suspicion of alcohol use: yes    Suspicion of drug use: no    Tetanus status:  Unknown  Prior to arrival data:     Bystander interventions:  None    Loss of consciousness: yes      Amnesic to event: no      Airway interventions:  None    IV access status:  Established    Medications administered:  None    Immobilization:  C-collar  Associated symptoms: headaches    Risk factors: anticoagulation therapy and asthma    Risk factors: no diabetes        Prior to Admission Medications   Prescriptions Last Dose Informant Patient Reported? Taking?    NIFEdipine ER (ADALAT CC) 60 MG 24 hr tablet   No Yes   Sig: Take 1 tablet by mouth once daily   Ventolin  (90 Base) MCG/ACT inhaler   No Yes   Sig: Inhale 2 puffs every 6 (six) hours as needed for wheezing   atorvastatin (LIPITOR) 40 mg tablet   No Yes   Sig: Take 1 tablet (40 mg total) by mouth daily with dinner   furosemide (LASIX) 40 mg tablet   No Yes   Sig: Take 1 tablet by mouth once daily   gabapentin (NEURONTIN) 400 mg capsule   No Yes   Sig: Take 1 capsule (400 mg total) by mouth 3 (three) times a day   hydrALAZINE (APRESOLINE) 100 MG tablet   No Yes   Sig: Take 1 tablet (100 mg total) by mouth 3 (three) times a day   levothyroxine 137 mcg tablet   No Yes   Sig: Take 1 tablet (137 mcg total) by mouth daily   lidocaine (LIDODERM) 5 %   No No   Sig: Apply 1 patch topically daily Remove & Discard patch within 12 hours or as directed by MD   methocarbamol (ROBAXIN) 500 mg tablet   No Yes   Sig: Take 1 tablet (500 mg total) by mouth 2 (two) times a day   methocarbamol (ROBAXIN) 750 mg tablet   No No   Sig: Take 1 tablet (750 mg total) by mouth 3 (three) times a day as needed for muscle spasms   rivaroxaban (XARELTO) 20 mg tablet   No Yes   Sig: Take 1 tablet (20 mg total) by mouth daily with breakfast   triamcinolone (KENALOG) 0 1 % cream   No No   Sig: APPLY  CREAM EXTERNALLY TWICE DAILY   zolpidem (AMBIEN) 5 mg tablet   No Yes   Sig: Take 1 tablet (5 mg total) by mouth daily at bedtime as needed for sleep      Facility-Administered Medications: None       Past Medical History:   Diagnosis Date    Acute renal failure (ARF) (HCC)     Anemia     Asthma     Atopic dermatitis     Chronic kidney disease     Stage 3    Colon polyps     Diabetes mellitus (HCC)     type 2    Disease of thyroid gland     hypothyroidism    Diverticulosis     Edema of both legs     Gout     H/O colonoscopy     H/O mammogram     Hemorrhoids     Hyperlipidemia     Hypertension     Nephrosclerosis     Pain in unspecified foot     Peripheral neuropathy     Phlebitis     Venous stasis dermatitis of both lower extremities     Wrist joint pain     LEFT       Past Surgical History:   Procedure Laterality Date    CERVIX SURGERY      COLONOSCOPY N/A 5/31/2016    Procedure: COLONOSCOPY;  Surgeon: Winnie Duncan MD;  Location: [facility];   Service:     VEIN SURGERY         Family History   Problem Relation Age of Onset    Diabetes Mother         Type 2 as per allscripts    Heart attack Father     Diabetes type II Sister     Hypertension Other      I have reviewed and agree with the history as documented  E-Cigarette/Vaping    E-Cigarette Use Never User      E-Cigarette/Vaping Substances    Nicotine No     THC No     CBD No      Social History     Tobacco Use    Smoking status: Current Every Day Smoker     Packs/day: 0 25     Years: 15 00     Pack years: 3 75     Types: Cigarettes    Smokeless tobacco: Never Used    Tobacco comment: 5 cigarettes a day   Vaping Use    Vaping Use: Never used   Substance Use Topics    Alcohol use: Yes     Comment: social    Drug use: No       Review of Systems   Constitutional: Negative  HENT: Negative  Respiratory: Negative  Cardiovascular: Negative  Gastrointestinal: Negative  Genitourinary: Negative  Musculoskeletal: Negative  Neurological: Positive for headaches  Negative for dizziness, facial asymmetry, speech difficulty, weakness, light-headedness and numbness  All other systems reviewed and are negative  Physical Exam  Physical Exam  Constitutional:       General: She is not in acute distress  Appearance: She is well-developed  She is not diaphoretic  Interventions: Cervical collar in place  Comments: Pt is heavily intoxicated and slurring her words  HENT:      Head: Normocephalic  Contusion present  No raccoon eyes, Pisano's sign, abrasion, right periorbital erythema, left periorbital erythema or laceration  Comments: Healing ecchymosis and hematoma noted to left side of face and eyebrow area  Right Ear: External ear normal       Left Ear: External ear normal       Nose: Nose normal    Eyes:      General: No scleral icterus  Right eye: No discharge  Left eye: No discharge  Extraocular Movements: Extraocular movements intact        Conjunctiva/sclera: Conjunctivae normal       Pupils: Pupils are equal, round, and reactive to light  Cardiovascular:      Rate and Rhythm: Normal rate and regular rhythm  Heart sounds: Normal heart sounds  Pulmonary:      Effort: Pulmonary effort is normal       Breath sounds: Normal breath sounds  Musculoskeletal:         General: Normal range of motion  Right shoulder: Normal       Left shoulder: Normal       Right elbow: Normal       Left elbow: Normal       Right wrist: Normal       Left wrist: Normal       Cervical back: Normal, normal range of motion and neck supple  Thoracic back: Normal       Lumbar back: Normal       Right hip: Normal       Left hip: Normal       Right knee: Normal       Left knee: Normal       Right ankle: Normal       Left ankle: Normal    Skin:     General: Skin is warm and dry  Neurological:      Mental Status: She is alert and oriented to person, place, and time  Psychiatric:         Mood and Affect: Mood normal          Speech: Speech is slurred           Behavior: Behavior normal          Vital Signs  ED Triage Vitals [06/19/21 1953]   Temperature Pulse Respirations Blood Pressure SpO2   97 6 °F (36 4 °C) 84 (!) 24 164/88 95 %      Temp Source Heart Rate Source Patient Position - Orthostatic VS BP Location FiO2 (%)   Oral Monitor Lying Right arm --      Pain Score       --           Vitals:    06/19/21 1953 06/19/21 2059   BP: 164/88 117/73   Pulse: 84 72   Patient Position - Orthostatic VS: Lying Lying         Visual Acuity      ED Medications  Medications - No data to display    Diagnostic Studies  Results Reviewed     Procedure Component Value Units Date/Time    Protime-INR [[id number]]  (Abnormal) Collected: 06/19/21 1955    Lab Status: Final result Specimen: Blood from Arm, Right Updated: 06/19/21 2041     Protime 16 1 seconds      INR 1 31    APTT [[id number]]  (Abnormal) Collected: 06/19/21 1955    Lab Status: Final result Specimen: Blood from Arm, Right Updated: 06/19/21 2041     PTT 46 seconds     Ethanol [[id number]]  (Abnormal) Collected: 06/19/21 1955    Lab Status: Final result Specimen: Blood from Arm, Right Updated: 06/19/21 2027     Ethanol Lvl 254 mg/dL     Basic metabolic panel [[id number]]  (Abnormal) Collected: 06/19/21 1955    Lab Status: Final result Specimen: Blood from Arm, Right Updated: 06/19/21 2025     Sodium 138 mmol/L      Potassium 3 2 mmol/L      Chloride 101 mmol/L      CO2 23 mmol/L      ANION GAP 14 mmol/L      BUN 21 mg/dL      Creatinine 1 26 mg/dL      Glucose 79 mg/dL      Calcium 8 2 mg/dL      eGFR 45 ml/min/1 73sq m     Narrative:      [place] guidelines for Chronic Kidney Disease (CKD):     Stage 1 with normal or high GFR (GFR > 90 mL/min/1 73 square meters)    Stage 2 Mild CKD (GFR = 60-89 mL/min/1 73 square meters)    Stage 3A Moderate CKD (GFR = 45-59 mL/min/1 73 square meters)    Stage 3B Moderate CKD (GFR = 30-44 mL/min/1 73 square meters)    Stage 4 Severe CKD (GFR = 15-29 mL/min/1 73 square meters)    Stage 5 End Stage CKD (GFR <15 mL/min/1 73 square meters)  Note: GFR calculation is accurate only with a steady state creatinine    CBC and differential [[id number]]  (Abnormal) Collected: 06/19/21 1955    Lab Status: Final result Specimen: Blood from Arm, Right Updated: 06/19/21 2016     WBC 6 77 Thousand/uL      RBC 3 41 Million/uL      Hemoglobin 9 9 g/dL      Hematocrit 32 4 %      MCV 95 fL      MCH 29 0 pg      MCHC 30 6 g/dL      RDW 14 8 %      MPV 10 4 fL      Platelets 421 Thousands/uL      nRBC 0 /100 WBCs      Neutrophils Relative 55 %      Immat GRANS % 1 %      Lymphocytes Relative 27 %      Monocytes Relative 11 %      Eosinophils Relative 5 %      Basophils Relative 1 %      Neutrophils Absolute 3 77 Thousands/µL      Immature Grans Absolute 0 04 Thousand/uL      Lymphocytes Absolute 1 85 Thousands/µL      Monocytes Absolute 0 71 Thousand/µL      Eosinophils Absolute 0 35 Thousand/µL      Basophils Absolute 0 05 Thousands/µL CT head without contrast   Final Result by Erna Virk MD (06/19 2049)      No acute intracranial abnormality  Workstation performed: [id number]         CT cervical spine without contrast   Final Result by Erna Virk MD (06/19 2108)      No cervical spine fracture or traumatic malalignment  Workstation performed: [id number]         CT facial bones without contrast   Final Result by Erna Virk MD (06/19 2056)      No fracture identified  Workstation performed: [id number]                    Procedures  Procedures         ED Course                             SBIRT 22yo+      Most Recent Value   SBIRT (22 yo +)   In order to provide better care to our patients, we are screening all of our patients for alcohol and drug use  Would it be okay to ask you these screening questions? No Filed at: 06/19/2021 2021                    MDM  Number of Diagnoses or Management Options  Closed head injury, initial encounter: new and requires workup  Fall, initial encounter: new and requires workup     Amount and/or Complexity of Data Reviewed  Clinical lab tests: ordered and reviewed  Tests in the radiology section of CPT®: ordered and reviewed  Independent visualization of images, tracings, or specimens: yes    Risk of Complications, Morbidity, and/or Mortality  Presenting problems: high  Management options: high  General comments: Fall on Xarelto  Patient Progress  Patient progress: stable      Disposition  Final diagnoses:   Fall, initial encounter   Closed head injury, initial encounter     Time reflects when diagnosis was documented in both MDM as applicable and the Disposition within this note     Time User Action Codes Description Comment    6/19/2021  9:50 PM Nay Olmstead Add [P97  YFSR] Fall, initial encounter     6/19/2021  9:50 PM Do RODAS Add [S09 90XA] Closed head injury, initial encounter       ED Disposition     ED Disposition Condition Date/Time Comment    Discharge Good Sat Jun 19, 2021  9:50 PM [facility] [address] discharge to home/self care  Follow-up Information     Follow up With Specialties Details Why Contact Info    Christy Lazaro DO Family Medicine Schedule an appointment as soon as possible for a visit today  [id number] [facility] [address]  [place] [address]  [phone]            Patient's Medications   Discharge Prescriptions    No medications on file     No discharge procedures on file      PDMP Review       Value Time User    PDMP Reviewed  Yes 10/30/2020 11:27 AM Christy Lazaro DO          ED Provider  Electronically Signed by           Ray Escalera PA-C  06/19/21 8917

## 2021-06-20 NOTE — ED NOTES
Provided pt with warm blankets that she requested  Pt continues to be verbally aggressive with RN  Instructed pt to try and take a nap  Turned lights off for comfort        Darian Cobb RN  06/19/21 8455

## 2021-06-23 DIAGNOSIS — E78.2 MIXED HYPERLIPIDEMIA: ICD-10-CM

## 2021-06-24 RX ORDER — ATORVASTATIN CALCIUM 40 MG/1
40 TABLET, FILM COATED ORAL
Qty: 90 TABLET | Refills: 1 | Status: SHIPPED | OUTPATIENT
Start: 2021-06-24 | End: 2022-01-21 | Stop reason: SDUPTHER

## 2021-09-10 DIAGNOSIS — G60.9 IDIOPATHIC PERIPHERAL NEUROPATHY: ICD-10-CM

## 2021-09-10 DIAGNOSIS — J45.40 MODERATE PERSISTENT ASTHMA WITHOUT COMPLICATION: ICD-10-CM

## 2021-09-10 DIAGNOSIS — I10 ESSENTIAL HYPERTENSION: ICD-10-CM

## 2021-09-10 RX ORDER — HYDRALAZINE HYDROCHLORIDE 100 MG/1
TABLET, FILM COATED ORAL
Qty: 270 TABLET | Refills: 0 | Status: SHIPPED | OUTPATIENT
Start: 2021-09-10 | End: 2022-01-21 | Stop reason: SDUPTHER

## 2021-09-10 RX ORDER — GABAPENTIN 400 MG/1
CAPSULE ORAL
Qty: 270 CAPSULE | Refills: 0 | Status: SHIPPED | OUTPATIENT
Start: 2021-09-10 | End: 2022-01-21 | Stop reason: SDUPTHER

## 2021-09-10 RX ORDER — NIFEDIPINE 60 MG/1
TABLET, FILM COATED, EXTENDED RELEASE ORAL
Qty: 90 TABLET | Refills: 0 | Status: SHIPPED | OUTPATIENT
Start: 2021-09-10 | End: 2022-02-02 | Stop reason: ALTCHOICE

## 2021-10-24 DIAGNOSIS — I82.462 ACUTE DEEP VEIN THROMBOSIS (DVT) OF CALF MUSCLE VEIN OF LEFT LOWER EXTREMITY (HCC): ICD-10-CM

## 2021-10-25 RX ORDER — RIVAROXABAN 20 MG/1
TABLET, FILM COATED ORAL
Qty: 90 TABLET | Refills: 0 | Status: SHIPPED | OUTPATIENT
Start: 2021-10-25 | End: 2022-01-21 | Stop reason: SDUPTHER

## 2021-11-08 ENCOUNTER — TELEPHONE (OUTPATIENT)
Dept: ADMINISTRATIVE | Facility: OTHER | Age: 66
End: 2021-11-08

## 2022-01-21 ENCOUNTER — OFFICE VISIT (OUTPATIENT)
Dept: FAMILY MEDICINE CLINIC | Facility: CLINIC | Age: 67
End: 2022-01-21
Payer: COMMERCIAL

## 2022-01-21 VITALS
OXYGEN SATURATION: 99 % | TEMPERATURE: 96.6 F | BODY MASS INDEX: 25.77 KG/M2 | SYSTOLIC BLOOD PRESSURE: 170 MMHG | HEIGHT: 69 IN | HEART RATE: 89 BPM | WEIGHT: 174 LBS | DIASTOLIC BLOOD PRESSURE: 90 MMHG

## 2022-01-21 DIAGNOSIS — I82.462 ACUTE DEEP VEIN THROMBOSIS (DVT) OF CALF MUSCLE VEIN OF LEFT LOWER EXTREMITY (HCC): ICD-10-CM

## 2022-01-21 DIAGNOSIS — R73.03 PREDIABETES: ICD-10-CM

## 2022-01-21 DIAGNOSIS — E78.2 MIXED HYPERLIPIDEMIA: ICD-10-CM

## 2022-01-21 DIAGNOSIS — G60.9 IDIOPATHIC PERIPHERAL NEUROPATHY: ICD-10-CM

## 2022-01-21 DIAGNOSIS — I82.409 RECURRENT DEEP VEIN THROMBOSIS (DVT) (HCC): ICD-10-CM

## 2022-01-21 DIAGNOSIS — I10 ESSENTIAL HYPERTENSION: ICD-10-CM

## 2022-01-21 DIAGNOSIS — Z72.0 TOBACCO ABUSE: ICD-10-CM

## 2022-01-21 DIAGNOSIS — Z00.00 ANNUAL PHYSICAL EXAM: Primary | ICD-10-CM

## 2022-01-21 DIAGNOSIS — E66.09 CLASS 1 OBESITY DUE TO EXCESS CALORIES WITH SERIOUS COMORBIDITY AND BODY MASS INDEX (BMI) OF 31.0 TO 31.9 IN ADULT: ICD-10-CM

## 2022-01-21 DIAGNOSIS — E03.9 ACQUIRED HYPOTHYROIDISM: ICD-10-CM

## 2022-01-21 PROBLEM — Z12.39 SCREENING FOR BREAST CANCER: Status: RESOLVED | Noted: 2019-06-26 | Resolved: 2022-01-21

## 2022-01-21 PROCEDURE — 99396 PREV VISIT EST AGE 40-64: CPT | Performed by: FAMILY MEDICINE

## 2022-01-21 RX ORDER — HYDRALAZINE HYDROCHLORIDE 100 MG/1
100 TABLET, FILM COATED ORAL 3 TIMES DAILY
Qty: 270 TABLET | Refills: 0 | Status: SHIPPED | OUTPATIENT
Start: 2022-01-21

## 2022-01-21 RX ORDER — FUROSEMIDE 40 MG/1
40 TABLET ORAL DAILY
Qty: 90 TABLET | Refills: 0 | Status: SHIPPED | OUTPATIENT
Start: 2022-01-21 | End: 2022-05-05 | Stop reason: SDUPTHER

## 2022-01-21 RX ORDER — LEVOTHYROXINE SODIUM 137 UG/1
137 TABLET ORAL DAILY
Qty: 90 TABLET | Refills: 0 | Status: SHIPPED | OUTPATIENT
Start: 2022-01-21 | End: 2022-05-12 | Stop reason: SDUPTHER

## 2022-01-21 RX ORDER — GABAPENTIN 400 MG/1
400 CAPSULE ORAL 3 TIMES DAILY
Qty: 270 CAPSULE | Refills: 0 | Status: SHIPPED | OUTPATIENT
Start: 2022-01-21 | End: 2022-05-12 | Stop reason: SDUPTHER

## 2022-01-21 RX ORDER — ATORVASTATIN CALCIUM 40 MG/1
40 TABLET, FILM COATED ORAL
Qty: 90 TABLET | Refills: 0 | Status: SHIPPED | OUTPATIENT
Start: 2022-01-21 | End: 2022-05-05 | Stop reason: SDUPTHER

## 2022-01-21 NOTE — ASSESSMENT & PLAN NOTE
- Patient admits to not taking levothyroxine in a number of months   - Recheck TSH  - Refill of levothyroxine 137 mcg provided

## 2022-01-21 NOTE — ASSESSMENT & PLAN NOTE
BMI Counseling: Body mass index is 25 7 kg/m²  The BMI is above normal  Nutrition recommendations include decreasing overall calorie intake, 3-5 servings of fruits/vegetables daily, consuming healthier snacks and moderation in carbohydrate intake  Exercise recommendations include moderate aerobic physical activity for 150 minutes/week

## 2022-01-21 NOTE — PROGRESS NOTES
ADULT ANNUAL PHYSICAL  [facility]    NAME: Justyna Doe  AGE: 61 y o  SEX: female  : 1955     DATE: 2022     Assessment and Plan:     Problem List Items Addressed This Visit        Endocrine    Acquired hypothyroidism     - Patient admits to not taking levothyroxine in a number of months   - Recheck TSH  - Refill of levothyroxine 137 mcg provided          Relevant Medications    levothyroxine 137 mcg tablet    Other Relevant Orders    TSH, 3rd generation       Cardiovascular and Mediastinum    Essential hypertension     - Uncontrolled per JNC 8 guidelines of <150/90  - Per chart review patient's blood pressure has been controlled on regimen of Nifedipine 60mg daily, hydralazine 100 mg TID and Lasix 40 mg daily although patient ran out of lasix a few months ago  - - Discussed importance of medication adherence, and the consequences of uncontrolled hypertension including stroke and MI  Discussed importance of low salt diet and avoidance of alcohol     - Red flag symptoms reviewed with patient including chest pain, SOB, changes in vision, headaches, and ER precautions if these develop  - Return to office in 4 weeks for blood pressure check          Relevant Medications    hydrALAZINE (APRESOLINE) 100 MG tablet    furosemide (LASIX) 40 mg tablet    Other Relevant Orders    Comprehensive metabolic panel    Microalbumin / creatinine urine ratio    Recurrent deep vein thrombosis (DVT) (HCC)     - Continue Xarelto 20mg daily             Other    Tobacco abuse (Chronic)     - Discussed tobacco cessation and  the effects of smoking on cardiovascular system, skin and lungs  - Patient verbalized understanding but is not ready to quit at this time  - Advised that the USPSTF recommends annual screening for lung cancer with low dose CT scan in patients aged 48 to [de-identified] with a 20 pack year smoking history  Patient has a 24 pack year smoking history    - Low dose CT ordered          Relevant Orders    CT lung screening program    Mixed hyperlipidemia     - Repeat lipid panel  - Continue atorvastatin 40mg daily          Relevant Medications    atorvastatin (LIPITOR) 40 mg tablet    Other Relevant Orders    Lipid Panel with Direct LDL reflex    Class 1 obesity due to excess calories with serious comorbidity and body mass index (BMI) of 31 0 to 31 9 in adult     BMI Counseling: Body mass index is 25 7 kg/m²  The BMI is above normal  Nutrition recommendations include decreasing overall calorie intake, 3-5 servings of fruits/vegetables daily, consuming healthier snacks and moderation in carbohydrate intake  Exercise recommendations include moderate aerobic physical activity for 150 minutes/week  Prediabetes     - Repeat hemoglobin A1C         Relevant Orders    HEMOGLOBIN A1C W/ EAG ESTIMATION    Annual physical exam - Primary     - The USPSTF recommends biennial screening mammography for women aged 48 to 76 years  Patient has agreed to undergo testing at this time  All questions were answered  - Patient states that she is up to date with colon cancer screening; will endeavor to obtain records of this  - Patient declines HIV and Hepatitis C screening at this time         Relevant Orders    CBC and Platelet    Mammo screening bilateral w 3d & cad      Other Visit Diagnoses     Idiopathic peripheral neuropathy        Relevant Medications    gabapentin (NEURONTIN) 400 mg capsule    Acute deep vein thrombosis (DVT) of calf muscle vein of left lower extremity (HCC)        Relevant Medications    rivaroxaban (Xarelto) 20 mg tablet        Depression Screening Follow-up Plan: Patient's depression screening was positive with a PHQ-2 score of 3  Their PHQ-9 score was 5  Patient assessed for underlying major depression  They have no active suicidal ideations  Brief counseling provided and recommend additional follow-up/re-evaluation next office visit       Immunizations and preventive care screenings were discussed with patient today  Appropriate education was printed on patient's after visit summary  Counseling:  Alcohol/drug use: discussed moderation in alcohol intake, the recommendations for healthy alcohol use, and avoidance of illicit drug use  · Exercise: the importance of regular exercise/physical activity was discussed  Recommend exercise 3-5 times per week for at least 30 minutes  No follow-ups on file  Chief Complaint:     Chief Complaint   Patient presents with    New Patient Visit      History of Present Illness:     Adult Annual Physical   Erin Korey is a 61year old female with a past medical history of hypertension, dyslipidemia, tobacco abuse, prediabetes and neuropathy who presents today for a comprehensive physical exam    The patient reports no problems  She currently works as a  at [employer] and is   She smokes 1/2 a pack of cigarettes a day and has done so for the past 48 years  She admits to drinking alcohol twice a week and has a past history of recreational drug use but not anymore  Diet and Physical Activity  · Diet/Nutrition: well balanced diet  · Exercise: no formal exercise  Depression Screening  PHQ-2/9 Depression Screening    Little interest or pleasure in doing things: 3 - nearly every day  Feeling down, depressed, or hopeless: 0 - not at all  Trouble falling or staying asleep, or sleeping too much: 2 - more than half the days  Feeling tired or having little energy: 0 - not at all  Poor appetite or overeatin - not at all  Feeling bad about yourself - or that you are a failure or have let yourself or your family down: 0 - not at all  Trouble concentrating on things, such as reading the newspaper or watching television: 0 - not at all  Moving or speaking so slowly that other people could have noticed   Or the opposite - being so fidgety or restless that you have been moving around a lot more than usual: 0 - not at all  Thoughts that you would be better off dead, or of hurting yourself in some way: 0 - not at all  PHQ-2 Score: 3  PHQ-2 Interpretation: POSITIVE depression screen  PHQ-9 Score: 5   PHQ-9 Interpretation: Mild depression        General Health  · Sleep: gets 4-6 hours of sleep on average  · Hearing: normal - bilateral   · Vision: most recent eye exam >1 year ago and wears glasses  · Dental: regular dental visits  /GYN Health  · Patient is: postmenopausal  · Last menstrual period: N/A  · Contraceptive method: None  Review of Systems:     Review of Systems   Constitutional: Negative  HENT: Negative  Eyes: Negative  Respiratory: Negative  Cardiovascular: Negative  Gastrointestinal: Negative  Genitourinary: Negative  Musculoskeletal: Negative  Skin: Negative  Neurological: Negative  Psychiatric/Behavioral: Negative  Past Medical History:     Past Medical History:   Diagnosis Date    Acute renal failure (ARF) ([facility] [address] )     Anemia     Asthma     Atopic dermatitis     Chronic kidney disease     Stage 3    Colon polyps     Diabetes mellitus (HCC)     type 2    Disease of thyroid gland     hypothyroidism    Diverticulosis     Edema of both legs     Gout     H/O colonoscopy     H/O mammogram     Hemorrhoids     Hyperlipidemia     Hypertension     Nephrosclerosis     Pain in unspecified foot     Peripheral neuropathy     Phlebitis     Venous stasis dermatitis of both lower extremities     Wrist joint pain     LEFT      Past Surgical History:     Past Surgical History:   Procedure Laterality Date    CERVIX SURGERY      COLONOSCOPY N/A 5/31/2016    Procedure: COLONOSCOPY;  Surgeon: Louis Laguerre MD;  Location: [facility];   Service:     VEIN SURGERY        Social History:     Social History     Socioeconomic History    Marital status: /Civil Union     Spouse name: None    Number of children: None    Years of education: None  Highest education level: None   Occupational History    None   Tobacco Use    Smoking status: Current Every Day Smoker     Packs/day: 0 25     Years: 15 00     Pack years: 3 75     Types: Cigarettes    Smokeless tobacco: Never Used    Tobacco comment: 5 cigarettes a day   Vaping Use    Vaping Use: Never used   Substance and Sexual Activity    Alcohol use: Yes     Comment: social    Drug use: No    Sexual activity: Yes     Partners: Male     Birth control/protection: Post-menopausal   Other Topics Concern    None   Social History Narrative    Caffeine use    Daily caffeine consumption, 1 serving a day    Uses safety equipment- seatbelts     Social Determinants of Health     Financial Resource Strain: Not on file   Food Insecurity: Not on file   Transportation Needs: Not on file   Physical Activity: Not on file   Stress: Not on file   Social Connections: Not on file   Intimate Partner Violence: Not on file   Housing Stability: Not on file      Family History:     Family History   Problem Relation Age of Onset    Diabetes Mother         Type 2 as per allscripts    Heart attack Father     Diabetes type II Sister     Hypertension Other       Current Medications:     Current Outpatient Medications   Medication Sig Dispense Refill    atorvastatin (LIPITOR) 40 mg tablet Take 1 tablet (40 mg total) by mouth daily with dinner 90 tablet 0    furosemide (LASIX) 40 mg tablet Take 1 tablet (40 mg total) by mouth daily 90 tablet 0    gabapentin (NEURONTIN) 400 mg capsule Take 1 capsule (400 mg total) by mouth 3 (three) times a day 270 capsule 0    hydrALAZINE (APRESOLINE) 100 MG tablet Take 1 tablet (100 mg total) by mouth 3 (three) times a day 270 tablet 0    levothyroxine 137 mcg tablet Take 1 tablet (137 mcg total) by mouth daily 90 tablet 0    lidocaine (LIDODERM) 5 % Apply 1 patch topically daily Remove & Discard patch within 12 hours or as directed by MD 30 patch 0    methocarbamol (ROBAXIN) 500 mg tablet Take 1 tablet (500 mg total) by mouth 2 (two) times a day 10 tablet 0    methocarbamol (ROBAXIN) 750 mg tablet Take 1 tablet (750 mg total) by mouth 3 (three) times a day as needed for muscle spasms 42 tablet 0    NIFEdipine ER (ADALAT CC) 60 MG 24 hr tablet Take 1 tablet by mouth once daily 90 tablet 0    rivaroxaban (Xarelto) 20 mg tablet Take 1 tablet (20 mg total) by mouth daily with breakfast 90 tablet 0    triamcinolone (KENALOG) 0 1 % cream APPLY  CREAM EXTERNALLY TWICE DAILY 45 g 0    Ventolin  (90 Base) MCG/ACT inhaler INHALE 2 PUFFS BY MOUTH EVERY 6 HOURS AS NEEDED FOR WHEEZING 18 g 0    zolpidem (AMBIEN) 5 mg tablet Take 1 tablet (5 mg total) by mouth daily at bedtime as needed for sleep 30 tablet 0     No current facility-administered medications for this visit  Allergies: Allergies   Allergen Reactions    Neosporin [Neomycin-Bacitracin Zn-Polymyx]     Niacin And Related     Shellfish-Derived Products - Food Allergy      seafood      Physical Exam:     /90 (BP Location: Left arm, Patient Position: Sitting, Cuff Size: Large)   Pulse 89   Temp (!) 96 6 °F (35 9 °C) (Skin)   Ht 5' 9" (1 753 m)   Wt 78 9 kg (174 lb)   SpO2 99%   BMI 25 70 kg/m²     Physical Exam  Constitutional:       General: She is not in acute distress  Appearance: Normal appearance  HENT:      Head: Normocephalic and atraumatic  Mouth/Throat:      Comments: Dentures present  Eyes:      General:         Right eye: No discharge  Left eye: No discharge  Extraocular Movements: Extraocular movements intact  Cardiovascular:      Rate and Rhythm: Normal rate  Pulmonary:      Effort: Pulmonary effort is normal  No respiratory distress  Breath sounds: No wheezing  Abdominal:      General: Bowel sounds are normal  There is no distension  Palpations: Abdomen is soft  Tenderness: There is no abdominal tenderness  There is no guarding     Musculoskeletal: Cervical back: Normal range of motion  Right lower le+ Edema present  Left lower le+ Edema present  Neurological:      General: No focal deficit present  Mental Status: She is alert  Motor: No weakness        Coordination: Coordination normal       Gait: Gait normal       Deep Tendon Reflexes: Reflexes normal    Psychiatric:         Mood and Affect: Mood normal          Behavior: Behavior normal           Anirudh Guzman MD  [address]

## 2022-01-21 NOTE — ASSESSMENT & PLAN NOTE
- The USPSTF recommends biennial screening mammography for women aged 48 to 76 years  Patient has agreed to undergo testing at this time  All questions were answered  - Patient states that she is up to date with colon cancer screening; will endeavor to obtain records of this    - Patient declines HIV and Hepatitis C screening at this time

## 2022-01-21 NOTE — ASSESSMENT & PLAN NOTE
- Uncontrolled per JNC 8 guidelines of <150/90  - Per chart review patient's blood pressure has been controlled on regimen of Nifedipine 60mg daily, hydralazine 100 mg TID and Lasix 40 mg daily although patient ran out of lasix a few months ago  - - Discussed importance of medication adherence, and the consequences of uncontrolled hypertension including stroke and MI  Discussed importance of low salt diet and avoidance of alcohol     - Red flag symptoms reviewed with patient including chest pain, SOB, changes in vision, headaches, and ER precautions if these develop    - Return to office in 4 weeks for blood pressure check

## 2022-01-31 ENCOUNTER — APPOINTMENT (OUTPATIENT)
Dept: LAB | Facility: HOSPITAL | Age: 67
End: 2022-01-31
Attending: FAMILY MEDICINE
Payer: COMMERCIAL

## 2022-01-31 DIAGNOSIS — E03.9 ACQUIRED HYPOTHYROIDISM: ICD-10-CM

## 2022-01-31 DIAGNOSIS — Z00.00 ANNUAL PHYSICAL EXAM: ICD-10-CM

## 2022-01-31 DIAGNOSIS — I10 ESSENTIAL HYPERTENSION: ICD-10-CM

## 2022-01-31 DIAGNOSIS — R73.03 PREDIABETES: ICD-10-CM

## 2022-01-31 DIAGNOSIS — E78.2 MIXED HYPERLIPIDEMIA: ICD-10-CM

## 2022-01-31 LAB
ALBUMIN SERPL BCP-MCNC: 3.3 G/DL (ref 3.5–5)
ALP SERPL-CCNC: 147 U/L (ref 46–116)
ALT SERPL W P-5'-P-CCNC: 96 U/L (ref 12–78)
ANION GAP SERPL CALCULATED.3IONS-SCNC: 9 MMOL/L (ref 4–13)
AST SERPL W P-5'-P-CCNC: 64 U/L (ref 5–45)
BILIRUB SERPL-MCNC: 0.21 MG/DL (ref 0.2–1)
BUN SERPL-MCNC: 27 MG/DL (ref 5–25)
CALCIUM ALBUM COR SERPL-MCNC: 8.8 MG/DL (ref 8.3–10.1)
CALCIUM SERPL-MCNC: 8.2 MG/DL (ref 8.3–10.1)
CHLORIDE SERPL-SCNC: 108 MMOL/L (ref 100–108)
CHOLEST SERPL-MCNC: 154 MG/DL
CO2 SERPL-SCNC: 27 MMOL/L (ref 21–32)
CREAT SERPL-MCNC: 1 MG/DL (ref 0.6–1.3)
CREAT UR-MCNC: 21.4 MG/DL
ERYTHROCYTE [DISTWIDTH] IN BLOOD BY AUTOMATED COUNT: 14.9 % (ref 11.6–15.1)
EST. AVERAGE GLUCOSE BLD GHB EST-MCNC: 134 MG/DL
GFR SERPL CREATININE-BSD FRML MDRD: 59 ML/MIN/1.73SQ M
GLUCOSE P FAST SERPL-MCNC: 111 MG/DL (ref 65–99)
HBA1C MFR BLD: 6.3 %
HCT VFR BLD AUTO: 35.4 % (ref 34.8–46.1)
HDLC SERPL-MCNC: 77 MG/DL
HGB BLD-MCNC: 11 G/DL (ref 11.5–15.4)
LDLC SERPL CALC-MCNC: 62 MG/DL (ref 0–100)
MCH RBC QN AUTO: 30.4 PG (ref 26.8–34.3)
MCHC RBC AUTO-ENTMCNC: 31.1 G/DL (ref 31.4–37.4)
MCV RBC AUTO: 98 FL (ref 82–98)
MICROALBUMIN UR-MCNC: 96.9 MG/L (ref 0–20)
MICROALBUMIN/CREAT 24H UR: 453 MG/G CREATININE (ref 0–30)
PLATELET # BLD AUTO: 256 THOUSANDS/UL (ref 149–390)
PMV BLD AUTO: 10.1 FL (ref 8.9–12.7)
POTASSIUM SERPL-SCNC: 4.1 MMOL/L (ref 3.5–5.3)
PROT SERPL-MCNC: 7.3 G/DL (ref 6.4–8.2)
RBC # BLD AUTO: 3.62 MILLION/UL (ref 3.81–5.12)
SODIUM SERPL-SCNC: 144 MMOL/L (ref 136–145)
TRIGL SERPL-MCNC: 74 MG/DL
TSH SERPL DL<=0.05 MIU/L-ACNC: 1.58 UIU/ML (ref 0.36–3.74)
WBC # BLD AUTO: 6.48 THOUSAND/UL (ref 4.31–10.16)

## 2022-01-31 PROCEDURE — 3044F HG A1C LEVEL LT 7.0%: CPT | Performed by: FAMILY MEDICINE

## 2022-01-31 PROCEDURE — 80053 COMPREHEN METABOLIC PANEL: CPT

## 2022-01-31 PROCEDURE — 84443 ASSAY THYROID STIM HORMONE: CPT

## 2022-01-31 PROCEDURE — 83036 HEMOGLOBIN GLYCOSYLATED A1C: CPT

## 2022-01-31 PROCEDURE — 82570 ASSAY OF URINE CREATININE: CPT

## 2022-01-31 PROCEDURE — 36415 COLL VENOUS BLD VENIPUNCTURE: CPT

## 2022-01-31 PROCEDURE — 82043 UR ALBUMIN QUANTITATIVE: CPT

## 2022-01-31 PROCEDURE — 85027 COMPLETE CBC AUTOMATED: CPT

## 2022-01-31 PROCEDURE — 80061 LIPID PANEL: CPT

## 2022-02-01 ENCOUNTER — TELEPHONE (OUTPATIENT)
Dept: FAMILY MEDICINE CLINIC | Facility: CLINIC | Age: 67
End: 2022-02-01

## 2022-02-01 NOTE — TELEPHONE ENCOUNTER
Left message for patient to call back regarding lab results; will also send message to clinical staff

## 2022-02-02 DIAGNOSIS — I10 ESSENTIAL HYPERTENSION: Primary | ICD-10-CM

## 2022-02-02 RX ORDER — LISINOPRIL 20 MG/1
20 TABLET ORAL DAILY
Qty: 30 TABLET | Refills: 2 | Status: SHIPPED | OUTPATIENT
Start: 2022-02-02 | End: 2022-05-05 | Stop reason: SDUPTHER

## 2022-03-03 ENCOUNTER — CLINICAL SUPPORT (OUTPATIENT)
Dept: FAMILY MEDICINE CLINIC | Facility: CLINIC | Age: 67
End: 2022-03-03

## 2022-03-03 VITALS — SYSTOLIC BLOOD PRESSURE: 140 MMHG | DIASTOLIC BLOOD PRESSURE: 80 MMHG

## 2022-03-03 DIAGNOSIS — Z01.30 BLOOD PRESSURE CHECK: Primary | ICD-10-CM

## 2022-03-21 ENCOUNTER — VBI (OUTPATIENT)
Dept: ADMINISTRATIVE | Facility: OTHER | Age: 67
End: 2022-03-21

## 2022-05-05 DIAGNOSIS — E78.2 MIXED HYPERLIPIDEMIA: ICD-10-CM

## 2022-05-05 DIAGNOSIS — I10 ESSENTIAL HYPERTENSION: ICD-10-CM

## 2022-05-05 RX ORDER — ATORVASTATIN CALCIUM 40 MG/1
40 TABLET, FILM COATED ORAL
Qty: 90 TABLET | Refills: 2 | Status: SHIPPED | OUTPATIENT
Start: 2022-05-05

## 2022-05-05 RX ORDER — FUROSEMIDE 40 MG/1
40 TABLET ORAL DAILY
Qty: 90 TABLET | Refills: 2 | Status: SHIPPED | OUTPATIENT
Start: 2022-05-05 | End: 2022-05-12 | Stop reason: SDUPTHER

## 2022-05-05 RX ORDER — LISINOPRIL 20 MG/1
20 TABLET ORAL DAILY
Qty: 90 TABLET | Refills: 2 | Status: SHIPPED | OUTPATIENT
Start: 2022-05-05

## 2022-05-12 ENCOUNTER — OFFICE VISIT (OUTPATIENT)
Dept: FAMILY MEDICINE CLINIC | Facility: CLINIC | Age: 67
End: 2022-05-12
Payer: COMMERCIAL

## 2022-05-12 VITALS
TEMPERATURE: 98 F | HEIGHT: 69 IN | OXYGEN SATURATION: 96 % | BODY MASS INDEX: 25.92 KG/M2 | HEART RATE: 85 BPM | DIASTOLIC BLOOD PRESSURE: 78 MMHG | SYSTOLIC BLOOD PRESSURE: 138 MMHG | WEIGHT: 175 LBS

## 2022-05-12 DIAGNOSIS — R73.03 PREDIABETES: ICD-10-CM

## 2022-05-12 DIAGNOSIS — E03.9 ACQUIRED HYPOTHYROIDISM: ICD-10-CM

## 2022-05-12 DIAGNOSIS — I10 ESSENTIAL HYPERTENSION: Primary | ICD-10-CM

## 2022-05-12 DIAGNOSIS — R01.1 MURMUR: ICD-10-CM

## 2022-05-12 DIAGNOSIS — Z12.11 SCREENING FOR COLON CANCER: ICD-10-CM

## 2022-05-12 DIAGNOSIS — Z13.820 OSTEOPOROSIS SCREENING: ICD-10-CM

## 2022-05-12 DIAGNOSIS — E78.2 MIXED HYPERLIPIDEMIA: ICD-10-CM

## 2022-05-12 DIAGNOSIS — G60.9 IDIOPATHIC PERIPHERAL NEUROPATHY: ICD-10-CM

## 2022-05-12 PROCEDURE — 99214 OFFICE O/P EST MOD 30 MIN: CPT | Performed by: FAMILY MEDICINE

## 2022-05-12 RX ORDER — GABAPENTIN 400 MG/1
400 CAPSULE ORAL 3 TIMES DAILY
Qty: 270 CAPSULE | Refills: 0 | Status: SHIPPED | OUTPATIENT
Start: 2022-05-12

## 2022-05-12 RX ORDER — FUROSEMIDE 40 MG/1
40 TABLET ORAL DAILY
Qty: 90 TABLET | Refills: 2 | Status: SHIPPED | OUTPATIENT
Start: 2022-05-12

## 2022-05-12 RX ORDER — LEVOTHYROXINE SODIUM 137 UG/1
137 TABLET ORAL DAILY
Qty: 90 TABLET | Refills: 0 | Status: SHIPPED | OUTPATIENT
Start: 2022-05-12

## 2022-05-12 NOTE — PATIENT INSTRUCTIONS
Leg Cramps   WHAT YOU NEED TO KNOW:   A leg cramp is a sudden, painful squeeze in your calf (lower leg) or thigh (upper leg) muscles  The muscle may twitch under the skin or feel hard  Your leg may feel sore long after the muscles relax  DISCHARGE INSTRUCTIONS:   To stretch your leg:  Warm up your muscles before you stretch  Take a short walk or run slowly in place  If you get leg cramps while you sleep, you may also want to stretch before bedtime  The following exercises stretch the calves and thighs to help stop or prevent leg cramps: To stretch your calf and heel:      Stand up and place the palms of your hands against a wall  Lean into the wall, with one leg behind the other  Bend the front leg and keep the back leg straight  Press the heel of your back leg into the floor  Hold for 15 to 30 seconds, then switch sides  To stretch the back of your knee and thigh:      Sit on the floor with your legs straight in front of you  Do not point your toes  Place the palms of your hands at your sides on the floor  Slide your hands past your hips toward your ankles  Move toward your ankles until you feel a stretch in the back of your legs  Do not try to touch your head to your knees or round your back  Hold for 30 seconds  Drink plenty of liquids during exercise:  Water and other liquids help prevent cramps by replacing fluids lost in sweat  Drink more liquids when you are active in hot weather  To stop a leg cramp if it happens again:   Stretch and massage your muscle to stop the cramp  Apply heat or ice packs to the cramp  A heating pad or hot pack may help relieve tight muscles  An ice pack or crushed ice in a bag, wrapped in a towel can soothe tender or sore muscles  Take your medicine as directed:  Call your healthcare provider if you think your medicine is not helping or if you have side effects  Tell him if you are taking any vitamins, herbs, or other medicines   Keep a list of the medicines you take  Include the amounts, and when and why you take them  Bring the list or the pill bottles to follow-up visits  Follow up with your healthcare provider as directed:  Write down any questions you have so you remember to ask them in your follow-up visits  Contact your healthcare provider if:   Your leg cramps get worse or happen more often  Your leg feels numb  Your leg cramps do not go away with stretching or massage  You feel dizzy, lightheaded, or confused when you exercise in hot weather  You may have a headache or blurred vision  © Copyright [employer] 2022 Information is for End User's use only and may not be sold, redistributed or otherwise used for commercial purposes  All illustrations and images included in CareNotes® are the copyrighted property of A D A M , Inc  or Yola Maurer  The above information is an  only  It is not intended as medical advice for individual conditions or treatments  Talk to your doctor, nurse or pharmacist before following any medical regimen to see if it is safe and effective for you

## 2022-05-13 PROBLEM — Z12.11 SCREENING FOR COLON CANCER: Status: ACTIVE | Noted: 2022-05-13

## 2022-05-13 PROBLEM — Z13.820 OSTEOPOROSIS SCREENING: Status: ACTIVE | Noted: 2022-05-13

## 2022-05-14 NOTE — ASSESSMENT & PLAN NOTE
Lab Results   Component Value Date    CHOLESTEROL 154 01/31/2022    CHOLESTEROL 154 01/28/2021    CHOLESTEROL 181 08/23/2019     Lab Results   Component Value Date    HDL 77 01/31/2022    HDL 63 01/28/2021    HDL 45 08/23/2019     Lab Results   Component Value Date    TRIG 74 01/31/2022    TRIG 152 (H) 01/28/2021    TRIG 158 (H) 08/23/2019     Lab Results   Component Value Date    NONHDLC 136 08/23/2019    [place] 127 07/03/2019     - Continue Atorvastatin 40mg daily

## 2022-05-14 NOTE — ASSESSMENT & PLAN NOTE
- Last colonoscopy was in 2016 with recommendation for repeat colonoscopy in 5 years  - Referral for colonoscopy placed

## 2022-05-14 NOTE — ASSESSMENT & PLAN NOTE
- Well controlled per JNC 8 guidelines of <140/90  - Continue current medication regimen of lisinopril 20 mg, hydralazine 100 mg TID and Lasix 40 mg daily   - Repeat CMP ordered   - Echocardiogram ordered due to presence of murmur heard on physical examination although patient is asymptomatic

## 2022-05-14 NOTE — PROGRESS NOTES
Assessment/Plan:    Acquired hypothyroidism  - Stable; continue levothyroxine 137mcg daily   - Repeat TSH     Essential hypertension  - Well controlled per JNC 8 guidelines of <140/90  - Continue current medication regimen of lisinopril 20 mg, hydralazine 100 mg TID and Lasix 40 mg daily   - Repeat CMP ordered   - Echocardiogram ordered due to presence of murmur heard on physical examination although patient is asymptomatic  Mixed hyperlipidemia  Lab Results   Component Value Date    CHOLESTEROL 154 01/31/2022    CHOLESTEROL 154 01/28/2021    CHOLESTEROL 181 08/23/2019     Lab Results   Component Value Date    HDL 77 01/31/2022    HDL 63 01/28/2021    HDL 45 08/23/2019     Lab Results   Component Value Date    TRIG 74 01/31/2022    TRIG 152 (H) 01/28/2021    TRIG 158 (H) 08/23/2019     Lab Results   Component Value Date    NONHDLC 136 08/23/2019    [place] 127 07/03/2019     - Continue Atorvastatin 40mg daily     Prediabetes  - Well controlled with diet and lifestyle modifications     Screening for colon cancer  - Last colonoscopy was in 2016 with recommendation for repeat colonoscopy in 5 years  - Referral for colonoscopy placed     Osteoporosis screening  - Given history of compression fracture will order a DXA scan  Diagnoses and all orders for this visit:    Screening for colon cancer  -     Ambulatory referral for colonoscopy; Future    Osteoporosis screening  -     DXA bone density spine hip and pelvis; Future    Idiopathic peripheral neuropathy  -     gabapentin (NEURONTIN) 400 mg capsule; Take 1 capsule (400 mg total) by mouth in the morning and 1 capsule (400 mg total) in the evening and 1 capsule (400 mg total) before bedtime  Essential hypertension  -     furosemide (LASIX) 40 mg tablet; Take 1 tablet (40 mg total) by mouth in the morning   -     Cancel: Basic metabolic panel; Future  -     Echo complete w/ contrast if indicated;  Future  -     Comprehensive metabolic panel; Future    Acquired hypothyroidism  -     levothyroxine 137 mcg tablet; Take 1 tablet (137 mcg total) by mouth in the morning  Murmur  -     Echo complete w/ contrast if indicated; Future    Mixed hyperlipidemia    Prediabetes          Subjective:      Patient ID: Kadie Nava is a 61 y o  female  HPI      Kadie Nava is a 61 y o  female who presents to the office today for a follow up of her chronic conditions  She reports that she is under a lot of stress at home as her  is an alcoholic but apart from that she is doing well  She remains compliant with prescribed medications, and is tolerating them without difficulty  She offers no new complaints, and denies any recent illnesses, ED visits, or hospitalizations  She is requesting a refill on some of her medications  The following portions of the patient's history were reviewed and updated as appropriate: allergies, current medications, past family history, past medical history, past social history, past surgical history and problem list     Review of Systems   Constitutional: Negative  HENT: Negative  Eyes: Negative  Respiratory: Negative  Cardiovascular: Negative  Gastrointestinal: Negative  Genitourinary: Negative  Musculoskeletal: Negative  Skin: Negative  Neurological: Negative  Psychiatric/Behavioral: Negative  Objective:      /78   Pulse 85   Temp 98 °F (36 7 °C) (Skin)   Ht 5' 9" (1 753 m)   Wt 79 4 kg (175 lb)   SpO2 96%   BMI 25 84 kg/m²          Physical Exam  Constitutional:       General: She is not in acute distress  Appearance: She is not ill-appearing  HENT:      Head: Normocephalic and atraumatic  Eyes:      General:         Right eye: No discharge  Left eye: No discharge  Extraocular Movements: Extraocular movements intact  Cardiovascular:      Rate and Rhythm: Normal rate  Heart sounds: Murmur heard     Pulmonary:      Effort: Pulmonary effort is normal  No respiratory distress  Breath sounds: No wheezing  Musculoskeletal:      Right lower leg: No edema  Left lower leg: No edema  Neurological:      General: No focal deficit present     Psychiatric:         Mood and Affect: Mood normal          Behavior: Behavior normal

## 2022-06-16 ENCOUNTER — HOSPITAL ENCOUNTER (OUTPATIENT)
Dept: MAMMOGRAPHY | Facility: MEDICAL CENTER | Age: 67
Discharge: HOME | End: 2022-06-16
Payer: COMMERCIAL

## 2022-06-16 ENCOUNTER — HOSPITAL ENCOUNTER (OUTPATIENT)
Dept: BONE DENSITY | Facility: MEDICAL CENTER | Age: 67
Discharge: HOME | End: 2022-06-16
Payer: COMMERCIAL

## 2022-06-16 VITALS — HEIGHT: 69 IN | BODY MASS INDEX: 25.93 KG/M2 | WEIGHT: 175.04 LBS

## 2022-06-16 DIAGNOSIS — Z13.820 OSTEOPOROSIS SCREENING: ICD-10-CM

## 2022-06-16 DIAGNOSIS — Z12.31 VISIT FOR SCREENING MAMMOGRAM: ICD-10-CM

## 2022-06-16 PROCEDURE — 77063 BREAST TOMOSYNTHESIS BI: CPT

## 2022-06-16 PROCEDURE — 77067 SCR MAMMO BI INCL CAD: CPT

## 2022-06-16 PROCEDURE — 77080 DXA BONE DENSITY AXIAL: CPT

## 2022-06-22 ENCOUNTER — TELEPHONE (OUTPATIENT)
Dept: FAMILY MEDICINE CLINIC | Facility: CLINIC | Age: 67
End: 2022-06-22

## 2022-06-22 NOTE — TELEPHONE ENCOUNTER
Pt LM on front office voicemail requesting refills on:  rivaroxaban (Xarelto) 20 mg tablet   albuterol (PROVENTIL HFA,VENTOLIN HFA) 90 mcg/act inhaler

## 2022-06-23 DIAGNOSIS — J45.40 MODERATE PERSISTENT ASTHMA WITHOUT COMPLICATION: ICD-10-CM

## 2022-06-23 DIAGNOSIS — I82.462 ACUTE DEEP VEIN THROMBOSIS (DVT) OF CALF MUSCLE VEIN OF LEFT LOWER EXTREMITY (HCC): ICD-10-CM

## 2022-07-28 ENCOUNTER — HOSPITAL ENCOUNTER (OUTPATIENT)
Dept: CT IMAGING | Facility: HOSPITAL | Age: 67
Discharge: HOME | End: 2022-07-28
Attending: FAMILY MEDICINE
Payer: COMMERCIAL

## 2022-07-28 ENCOUNTER — HOSPITAL ENCOUNTER (OUTPATIENT)
Dept: NON INVASIVE DIAGNOSTICS | Facility: HOSPITAL | Age: 67
Discharge: HOME | End: 2022-07-28
Attending: FAMILY MEDICINE
Payer: COMMERCIAL

## 2022-07-28 VITALS
SYSTOLIC BLOOD PRESSURE: 138 MMHG | DIASTOLIC BLOOD PRESSURE: 78 MMHG | HEART RATE: 85 BPM | WEIGHT: 175 LBS | HEIGHT: 69 IN | BODY MASS INDEX: 25.92 KG/M2

## 2022-07-28 DIAGNOSIS — Z72.0 TOBACCO ABUSE: ICD-10-CM

## 2022-07-28 DIAGNOSIS — R01.1 MURMUR: ICD-10-CM

## 2022-07-28 DIAGNOSIS — I10 ESSENTIAL HYPERTENSION: ICD-10-CM

## 2022-07-28 LAB
AORTIC ROOT: 3 CM
APICAL FOUR CHAMBER EJECTION FRACTION: 51 %
ASCENDING AORTA: 3.6 CM
FRACTIONAL SHORTENING: 30 % (ref 28–44)
INTERVENTRICULAR SEPTUM IN DIASTOLE (PARASTERNAL SHORT AXIS VIEW): 1.6 CM
INTERVENTRICULAR SEPTUM: 1.6 CM (ref 0.6–1.1)
LAAS-AP2: 24.1 CM2
LAAS-AP4: 23.7 CM2
LEFT ATRIUM SIZE: 4.8 CM
LEFT INTERNAL DIMENSION IN SYSTOLE: 2.8 CM (ref 2.1–4)
LEFT VENTRICULAR INTERNAL DIMENSION IN DIASTOLE: 4 CM (ref 3.5–6)
LEFT VENTRICULAR POSTERIOR WALL IN END DIASTOLE: 1.2 CM
LEFT VENTRICULAR STROKE VOLUME: 40 ML
LVSV (TEICH): 40 ML
MV E'TISSUE VEL-SEP: 5 CM/S
RIGHT ATRIAL 2D VOLUME: 28 ML
RIGHT ATRIUM AREA SYSTOLE A4C: 13.9 CM2
RIGHT VENTRICLE ID DIMENSION: 3.4 CM
SL CV LEFT ATRIUM LENGTH A2C: 6.2 CM
SL CV PED ECHO LEFT VENTRICLE DIASTOLIC VOLUME (MOD BIPLANE) 2D: 69 ML
SL CV PED ECHO LEFT VENTRICLE SYSTOLIC VOLUME (MOD BIPLANE) 2D: 29 ML

## 2022-07-28 PROCEDURE — 93306 TTE W/DOPPLER COMPLETE: CPT | Performed by: INTERNAL MEDICINE

## 2022-07-28 PROCEDURE — 93306 TTE W/DOPPLER COMPLETE: CPT

## 2022-07-28 PROCEDURE — 71271 CT THORAX LUNG CANCER SCR C-: CPT

## 2022-08-10 ENCOUNTER — TELEPHONE (OUTPATIENT)
Dept: OTHER | Facility: OTHER | Age: 67
End: 2022-08-10

## 2022-08-25 ENCOUNTER — OFFICE VISIT (OUTPATIENT)
Dept: FAMILY MEDICINE CLINIC | Facility: CLINIC | Age: 67
End: 2022-08-25
Payer: COMMERCIAL

## 2022-08-25 VITALS
HEART RATE: 75 BPM | SYSTOLIC BLOOD PRESSURE: 140 MMHG | BODY MASS INDEX: 24.88 KG/M2 | OXYGEN SATURATION: 98 % | DIASTOLIC BLOOD PRESSURE: 98 MMHG | HEIGHT: 69 IN | TEMPERATURE: 97.8 F | WEIGHT: 168 LBS

## 2022-08-25 DIAGNOSIS — E03.9 ACQUIRED HYPOTHYROIDISM: ICD-10-CM

## 2022-08-25 DIAGNOSIS — I10 ESSENTIAL HYPERTENSION: Primary | ICD-10-CM

## 2022-08-25 DIAGNOSIS — R22.32 FOREARM MASS, LEFT: ICD-10-CM

## 2022-08-25 DIAGNOSIS — G60.9 IDIOPATHIC PERIPHERAL NEUROPATHY: ICD-10-CM

## 2022-08-25 DIAGNOSIS — Z23 NEED FOR VACCINATION: ICD-10-CM

## 2022-08-25 PROCEDURE — 90677 PCV20 VACCINE IM: CPT

## 2022-08-25 PROCEDURE — 99214 OFFICE O/P EST MOD 30 MIN: CPT | Performed by: FAMILY MEDICINE

## 2022-08-25 PROCEDURE — 90471 IMMUNIZATION ADMIN: CPT

## 2022-08-25 RX ORDER — DULOXETIN HYDROCHLORIDE 30 MG/1
30 CAPSULE, DELAYED RELEASE ORAL DAILY
Qty: 90 CAPSULE | Refills: 0 | Status: SHIPPED | OUTPATIENT
Start: 2022-08-25

## 2022-08-25 RX ORDER — GABAPENTIN 100 MG/1
CAPSULE ORAL
Qty: 90 CAPSULE | Refills: 0 | Status: SHIPPED | OUTPATIENT
Start: 2022-08-25 | End: 2022-09-09

## 2022-08-26 PROBLEM — Z00.00 ANNUAL PHYSICAL EXAM: Status: RESOLVED | Noted: 2022-01-21 | Resolved: 2022-08-26

## 2022-08-26 PROBLEM — R22.32 FOREARM MASS, LEFT: Status: ACTIVE | Noted: 2022-08-26

## 2022-08-26 PROBLEM — G60.9 IDIOPATHIC PERIPHERAL NEUROPATHY: Status: ACTIVE | Noted: 2022-08-26

## 2022-08-26 PROBLEM — Z12.11 SCREENING FOR COLON CANCER: Status: RESOLVED | Noted: 2022-05-13 | Resolved: 2022-08-26

## 2022-08-26 PROBLEM — Z13.820 OSTEOPOROSIS SCREENING: Status: RESOLVED | Noted: 2022-05-13 | Resolved: 2022-08-26

## 2022-08-26 PROBLEM — Z23 NEED FOR VACCINATION: Status: ACTIVE | Noted: 2022-08-26

## 2022-08-26 NOTE — ASSESSMENT & PLAN NOTE
Could represent a cyst or lipoma; advised patient that if it continues to be painful or grows in size it will need to be excised

## 2022-08-26 NOTE — ASSESSMENT & PLAN NOTE
- Repeat TSH ordered at previous office visit; patient advised to obtain this at her earliest convenience    - Continue levothyroxine 137mg daily

## 2022-08-26 NOTE — PROGRESS NOTES
Assessment/Plan:    Need for vaccination  - PCV 20 vaccination administered; return in the fall/winter for Influenza vaccination     Essential hypertension  - Blood pressure is borderline at today's office visit; will continue current medication regimen of lisinopril 20 mg lisinopril and hydralazine 100 mg TID  - Patient advised to monitor blood pressure at home  If it continues to be borderline or elevated will increase lisinopril to 40mg daily    - Patient advised to obtain repeat CMP at her earliest convenience  Acquired hypothyroidism  - Repeat TSH ordered at previous office visit; patient advised to obtain this at her earliest convenience  - Continue levothyroxine 137mg daily     Idiopathic peripheral neuropathy  - Discussed tapering off gabapentin and starting patient on a trial of Cymbalta 30mg which patient is agreeable to at this time  Forearm mass, left  Could represent a cyst or lipoma; advised patient that if it continues to be painful or grows in size it will need to be excised  Diagnoses and all orders for this visit:    Essential hypertension    Idiopathic peripheral neuropathy  -     gabapentin (NEURONTIN) 100 mg capsule; Take 3 capsules (300 mg total) by mouth 3 (three) times a day for 5 days, THEN 2 capsules (200 mg total) 3 (three) times a day for 5 days, THEN 1 capsule (100 mg total) 3 (three) times a day for 5 days  -     DULoxetine (Cymbalta) 30 mg delayed release capsule; Take 1 capsule (30 mg total) by mouth daily    Acquired hypothyroidism    Need for vaccination  -     Pneumococcal Conjugate Vaccine 20-valent (Pcv20)    Forearm mass, left          Subjective:      Patient ID: Júnior Luis is a 61 y o  female  HPI    Júnior Luis is a pleasant 61year old female who presents today for a follow up of her chronic conditions  She reports that the last few months have been stressful as her  was recently hospitalized for 2 months but is now back home   She is reporting increased numbness and tingling in her feet  She is on gabapentin which she has been on for a few years but does not think that it is helping and would like to switch to another medication  She has also recently noticed a small tender lump in her left forearm  She is unsure whether this was related to an insect bite as the surrounding skin was bruised  She notes that the bruising has resolved  Apart from that she endorses no other complaints at this time  The following portions of the patient's history were reviewed and updated as appropriate: allergies, current medications, past family history, past medical history, past social history, past surgical history and problem list     Review of Systems   Constitutional: Negative  HENT: Negative  Eyes: Negative  Respiratory: Negative  Cardiovascular: Negative  Gastrointestinal: Negative  Genitourinary: Negative  Musculoskeletal: Negative  Skin: Positive for color change  Neurological:        Numbness and tingling in the feet   Psychiatric/Behavioral: Negative  Objective:      /98 (BP Location: Left arm, Patient Position: Sitting, Cuff Size: Standard)   Pulse 75   Temp 97 8 °F (36 6 °C) (Skin)   Ht 5' 9" (1 753 m)   Wt 76 2 kg (168 lb)   SpO2 98%   BMI 24 81 kg/m²          Physical Exam  Constitutional:       General: She is not in acute distress  Appearance: She is not ill-appearing  HENT:      Head: Normocephalic and atraumatic  Eyes:      General:         Right eye: No discharge  Left eye: No discharge  Extraocular Movements: Extraocular movements intact  Cardiovascular:      Rate and Rhythm: Normal rate  Pulses: Normal pulses  Pulmonary:      Effort: Pulmonary effort is normal  No respiratory distress  Breath sounds: No wheezing  Musculoskeletal:      Right lower leg: No edema  Left lower leg: No edema     Skin:     Comments: Small freely moveable mass noted on the left forearm with mild overlying erythema  Neurological:      General: No focal deficit present  Mental Status: She is alert     Psychiatric:         Mood and Affect: Mood normal          Behavior: Behavior normal

## 2022-08-26 NOTE — ASSESSMENT & PLAN NOTE
- Blood pressure is borderline at today's office visit; will continue current medication regimen of lisinopril 20 mg lisinopril and hydralazine 100 mg TID  - Patient advised to monitor blood pressure at home  If it continues to be borderline or elevated will increase lisinopril to 40mg daily    - Patient advised to obtain repeat CMP at her earliest convenience

## 2022-08-26 NOTE — ASSESSMENT & PLAN NOTE
- Discussed tapering off gabapentin and starting patient on a trial of Cymbalta 30mg which patient is agreeable to at this time

## 2022-09-05 DIAGNOSIS — E03.9 ACQUIRED HYPOTHYROIDISM: ICD-10-CM

## 2022-09-05 DIAGNOSIS — I82.462 ACUTE DEEP VEIN THROMBOSIS (DVT) OF CALF MUSCLE VEIN OF LEFT LOWER EXTREMITY (HCC): ICD-10-CM

## 2022-09-05 RX ORDER — LEVOTHYROXINE SODIUM 137 UG/1
TABLET ORAL
Qty: 90 TABLET | Refills: 0 | Status: SHIPPED | OUTPATIENT
Start: 2022-09-05

## 2022-09-06 RX ORDER — RIVAROXABAN 20 MG/1
TABLET, FILM COATED ORAL
Qty: 90 TABLET | Refills: 0 | Status: SHIPPED | OUTPATIENT
Start: 2022-09-06

## 2022-11-16 DIAGNOSIS — J45.40 MODERATE PERSISTENT ASTHMA WITHOUT COMPLICATION: ICD-10-CM

## 2022-11-16 DIAGNOSIS — I82.462 ACUTE DEEP VEIN THROMBOSIS (DVT) OF CALF MUSCLE VEIN OF LEFT LOWER EXTREMITY (HCC): ICD-10-CM

## 2022-11-29 ENCOUNTER — LAB (OUTPATIENT)
Dept: LAB | Facility: MEDICAL CENTER | Age: 67
End: 2022-11-29

## 2022-11-29 DIAGNOSIS — E03.9 ACQUIRED HYPOTHYROIDISM: ICD-10-CM

## 2022-11-29 DIAGNOSIS — I10 ESSENTIAL HYPERTENSION: ICD-10-CM

## 2022-11-29 LAB
ALBUMIN SERPL BCP-MCNC: 3.5 G/DL (ref 3.5–5)
ALP SERPL-CCNC: 746 U/L (ref 46–116)
ALT SERPL W P-5'-P-CCNC: 137 U/L (ref 12–78)
ANION GAP SERPL CALCULATED.3IONS-SCNC: 5 MMOL/L (ref 4–13)
AST SERPL W P-5'-P-CCNC: 102 U/L (ref 5–45)
BILIRUB SERPL-MCNC: 0.67 MG/DL (ref 0.2–1)
BUN SERPL-MCNC: 27 MG/DL (ref 5–25)
CALCIUM SERPL-MCNC: 9.3 MG/DL (ref 8.3–10.1)
CHLORIDE SERPL-SCNC: 103 MMOL/L (ref 96–108)
CO2 SERPL-SCNC: 30 MMOL/L (ref 21–32)
CREAT SERPL-MCNC: 1.38 MG/DL (ref 0.6–1.3)
GFR SERPL CREATININE-BSD FRML MDRD: 40 ML/MIN/1.73SQ M
GLUCOSE P FAST SERPL-MCNC: 102 MG/DL (ref 65–99)
POTASSIUM SERPL-SCNC: 3.8 MMOL/L (ref 3.5–5.3)
PROT SERPL-MCNC: 7.6 G/DL (ref 6.4–8.4)
SODIUM SERPL-SCNC: 138 MMOL/L (ref 135–147)
TSH SERPL DL<=0.05 MIU/L-ACNC: 14.9 UIU/ML (ref 0.45–4.5)

## 2022-11-30 ENCOUNTER — OFFICE VISIT (OUTPATIENT)
Dept: FAMILY MEDICINE CLINIC | Facility: CLINIC | Age: 67
End: 2022-11-30

## 2022-11-30 VITALS
HEART RATE: 83 BPM | DIASTOLIC BLOOD PRESSURE: 88 MMHG | HEIGHT: 69 IN | TEMPERATURE: 97.3 F | SYSTOLIC BLOOD PRESSURE: 176 MMHG | OXYGEN SATURATION: 94 % | BODY MASS INDEX: 25.48 KG/M2 | WEIGHT: 172 LBS

## 2022-11-30 DIAGNOSIS — F51.05 INSOMNIA DUE TO MENTAL CONDITION: ICD-10-CM

## 2022-11-30 DIAGNOSIS — M79.641 PAIN IN BOTH HANDS: ICD-10-CM

## 2022-11-30 DIAGNOSIS — N18.32 STAGE 3B CHRONIC KIDNEY DISEASE (HCC): ICD-10-CM

## 2022-11-30 DIAGNOSIS — F33.9 DEPRESSION, RECURRENT (HCC): ICD-10-CM

## 2022-11-30 DIAGNOSIS — R74.01 TRANSAMINITIS: ICD-10-CM

## 2022-11-30 DIAGNOSIS — M79.642 PAIN IN BOTH HANDS: ICD-10-CM

## 2022-11-30 DIAGNOSIS — G60.9 IDIOPATHIC PERIPHERAL NEUROPATHY: ICD-10-CM

## 2022-11-30 DIAGNOSIS — Z12.11 ENCOUNTER FOR SCREENING COLONOSCOPY: ICD-10-CM

## 2022-11-30 DIAGNOSIS — I10 ESSENTIAL HYPERTENSION: Primary | ICD-10-CM

## 2022-11-30 DIAGNOSIS — E03.9 ACQUIRED HYPOTHYROIDISM: ICD-10-CM

## 2022-11-30 DIAGNOSIS — I82.409 RECURRENT DEEP VEIN THROMBOSIS (DVT) (HCC): ICD-10-CM

## 2022-11-30 RX ORDER — HYDRALAZINE HYDROCHLORIDE 100 MG/1
100 TABLET, FILM COATED ORAL 3 TIMES DAILY
Qty: 270 TABLET | Refills: 0 | Status: SHIPPED | OUTPATIENT
Start: 2022-11-30

## 2022-11-30 RX ORDER — DULOXETIN HYDROCHLORIDE 30 MG/1
30 CAPSULE, DELAYED RELEASE ORAL DAILY
Qty: 90 CAPSULE | Refills: 0 | Status: SHIPPED | OUTPATIENT
Start: 2022-11-30

## 2022-11-30 RX ORDER — FUROSEMIDE 40 MG/1
40 TABLET ORAL DAILY
Qty: 90 TABLET | Refills: 0 | Status: SHIPPED | OUTPATIENT
Start: 2022-11-30

## 2022-11-30 RX ORDER — LEVOTHYROXINE SODIUM 137 UG/1
137 TABLET ORAL EVERY MORNING
Qty: 90 TABLET | Refills: 0 | Status: SHIPPED | OUTPATIENT
Start: 2022-11-30

## 2022-11-30 RX ORDER — HYDROXYZINE HYDROCHLORIDE 25 MG/1
25 TABLET, FILM COATED ORAL
Qty: 30 TABLET | Refills: 1 | Status: SHIPPED | OUTPATIENT
Start: 2022-11-30

## 2022-11-30 NOTE — PATIENT INSTRUCTIONS
- Please get lab work in one week  - Start taking the Hydralazine 100 mg three times a day for your blood pressure  - I have ordered an ultrasound of your liver because of the elevated liver enzymes   - I have ordered an XR of your hand because of the swelling   - Please go to the [place] office in 2 weeks to recheck your blood pressure and then I will follow up with you in person in 6 weeks     [address] [facility] [address] [place], [place], [address] [facility]  Phone: [phone]

## 2022-12-01 PROBLEM — M79.642 PAIN IN BOTH HANDS: Status: ACTIVE | Noted: 2022-12-01

## 2022-12-01 PROBLEM — Z23 NEED FOR VACCINATION: Status: RESOLVED | Noted: 2022-08-26 | Resolved: 2022-12-01

## 2022-12-01 PROBLEM — Z12.11 ENCOUNTER FOR SCREENING COLONOSCOPY: Status: ACTIVE | Noted: 2022-05-13

## 2022-12-01 PROBLEM — R74.01 TRANSAMINITIS: Status: ACTIVE | Noted: 2022-12-01

## 2022-12-01 PROBLEM — M79.641 PAIN IN BOTH HANDS: Status: ACTIVE | Noted: 2022-12-01

## 2022-12-01 PROBLEM — F51.05 INSOMNIA DUE TO MENTAL CONDITION: Status: ACTIVE | Noted: 2022-12-01

## 2022-12-01 NOTE — ASSESSMENT & PLAN NOTE
- Will order XRs of the hand as well as ESR, CRP, RF screen and anti-ccp to rule out inflammatory disorder

## 2022-12-01 NOTE — ASSESSMENT & PLAN NOTE
Lab Results   Component Value Date    EGFR 40 11/29/2022    EGFR 59 01/31/2022    EGFR 45 06/19/2021    CREATININE 1 38 (H) 11/29/2022    CREATININE 1 00 01/31/2022    CREATININE 1 26 06/19/2021     - Patient with a mild decline in kidney function from baseline  - Will obtain repeat CMP and consider referral to Nephrology if it worsens  - Patient advised to keep hydrated and avoids NSAIDs

## 2022-12-01 NOTE — ASSESSMENT & PLAN NOTE
- Etiology unclear at this time  Patient does have a history of alcohol abuse however is reportedly sober  Patient is also currently on a statin medication for dyslipidemia which can cause transaminitis  - At this time will order an ultrasound of the right upper quadrant and also order acute hepatitis serologies to rule out hepatitis, ceruloplasmin to rule out Milan disease, Alpha 1 antitrypsin to rule out alpha 1 antitrypsin deficiency, WILMER and anti-smooth muscle antibody to rule out autoimmune disorder, iron panel to rule out hemochromatosis  - Referral to gastroenterology placed

## 2022-12-01 NOTE — ASSESSMENT & PLAN NOTE
- Uncontrolled however patient has not been compliant with Hydralazine 100 mg TID  She also does not wish to take lisinopril anymore    - Refills for Hydralazine 100 mg TID sent to the pharmacy  - Patient to return to office in 2 weeks for a nurse visit to recheck blood pressure  - If still uncontrolled; recommend addition of an ARB due to albuminuria

## 2022-12-01 NOTE — PROGRESS NOTES
Assessment/Plan:    Acquired hypothyroidism  - Repeat TSH noted to be 14 900 however patient reports that she has not been taking levothyroxine 137 mcg for 3 months  - Restart levothyroxine 137 mcg daily   - Repeat TSH in 8 weeks     Essential hypertension  - Uncontrolled however patient has not been compliant with Hydralazine 100 mg TID  She also does not wish to take lisinopril anymore  - Refills for Hydralazine 100 mg TID sent to the pharmacy  - Patient to return to office in 2 weeks for a nurse visit to recheck blood pressure  - If still uncontrolled; recommend addition of an ARB due to albuminuria      Idiopathic peripheral neuropathy  - Improved since starting Cymbalta 30mg daily     Encounter for screening colonoscopy  - Referral to gastroenterology placed for repeat colonoscopy    Transaminitis  - Etiology unclear at this time  Patient does have a history of alcohol abuse however is reportedly sober  Patient is also currently on a statin medication for dyslipidemia which can cause transaminitis  - At this time will order an ultrasound of the right upper quadrant and also order acute hepatitis serologies to rule out hepatitis, ceruloplasmin to rule out Milan disease, Alpha 1 antitrypsin to rule out alpha 1 antitrypsin deficiency, WILMER and anti-smooth muscle antibody to rule out autoimmune disorder, iron panel to rule out hemochromatosis  - Referral to gastroenterology placed       Insomnia due to mental condition  - Start trial of hydroxyzine 25mg QHS PRN    Recurrent deep vein thrombosis (DVT) (HCC)  - Continue Xarelto 20mg daily     Pain in both hands  - Will order XRs of the hand as well as ESR, CRP, RF screen and anti-ccp to rule out inflammatory disorder    Stage 3b chronic kidney disease ([facility] [address] )  Lab Results   Component Value Date    EGFR 40 11/29/2022    EGFR 59 01/31/2022    EGFR 45 06/19/2021    CREATININE 1 38 (H) 11/29/2022    CREATININE 1 00 01/31/2022    CREATININE 1 26 06/19/2021     - Patient with a mild decline in kidney function from baseline  - Will obtain repeat CMP and consider referral to Nephrology if it worsens  - Patient advised to keep hydrated and avoids NSAIDs       Diagnoses and all orders for this visit:    Essential hypertension  -     hydrALAZINE (APRESOLINE) 100 MG tablet; Take 1 tablet (100 mg total) by mouth 3 (three) times a day  -     furosemide (LASIX) 40 mg tablet; Take 1 tablet (40 mg total) by mouth daily  -     Comprehensive metabolic panel; Future    Idiopathic peripheral neuropathy  -     DULoxetine (Cymbalta) 30 mg delayed release capsule; Take 1 capsule (30 mg total) by mouth daily    Acquired hypothyroidism  -     levothyroxine 137 mcg tablet; Take 1 tablet (137 mcg total) by mouth every morning    Transaminitis  -     Hepatitis panel, acute; Future  -     HIV 1/2 Antigen/Antibody (4th Generation) w Reflex [facility]; Future  -     Iron Panel (Includes Ferritin, Iron Sat%, Iron, and TIBC); Future  -     Comprehensive metabolic panel; Future  -     US right upper quadrant; Future  -     Gamma GT; Future  -     Anti-smooth muscle antibody, IgG; Future  -     Alpha-1-antitrypsin; Future  -     WILMER Screen w/ Reflex to Titer/Pattern; Future  -     Ambulatory Referral to Gastroenterology; Future  -     Ceruloplasmin; Future    Pain in both hands  -     Sedimentation rate, automated; Future  -     C-reactive protein; Future  -     XR hand 3+ vw left; Future  -     XR hand 3+ vw right; Future  -     RF Screen w/ Reflex to Titer; Future  -     Cyclic citrul peptide antibody, IgG; Future    Insomnia due to mental condition  -     hydrOXYzine HCL (ATARAX) 25 mg tablet; Take 1 tablet (25 mg total) by mouth daily at bedtime    Encounter for screening colonoscopy  -     Ambulatory Referral to Gastroenterology;  Future    [facility]    Recurrent deep vein thrombosis (DVT) (HCC)    Stage 3b chronic kidney disease ([facility] )          Subjective:      Patient ID: Lesli Olena is a 74 y o  female  HPI    Aren Handler is a pleasant 74year old female with a past medical history of hypertension, CKD, tobacco abuse, alcohol abuse in remission, CVA and recurrent DVT who presents today for a follow up  Unfortunately patient lose her insurance and has therefore been out of some of her medications for 3 months  Patient is concerned regarding her recent lab work and states that she has also been experiencing bilateral hand pain and swelling for the past few months  She has also been having trouble sleep due to anxiety and is requesting a sleeping aid  The following portions of the patient's history were reviewed and updated as appropriate: allergies, current medications, past family history, past medical history, past social history, past surgical history and problem list     Review of Systems   Constitutional: Negative  HENT: Negative  Eyes: Negative  Respiratory: Negative  Cardiovascular: Negative  Gastrointestinal: Negative  Genitourinary: Negative  Musculoskeletal: Positive for arthralgias  Skin: Negative  Neurological: Negative  Psychiatric/Behavioral: Positive for sleep disturbance  Objective:      BP (!) 176/88 (BP Location: Left arm, Patient Position: Sitting, Cuff Size: Standard)   Pulse 83   Temp (!) 97 3 °F (36 3 °C) (Tympanic)   Ht 5' 9" (1 753 m)   Wt 78 kg (172 lb)   SpO2 94%   BMI 25 40 kg/m²          Physical Exam  Constitutional:       General: She is not in acute distress  Appearance: She is not ill-appearing  HENT:      Head: Normocephalic and atraumatic  Eyes:      General:         Right eye: No discharge  Left eye: No discharge  Extraocular Movements: Extraocular movements intact  Pupils: Pupils are equal, round, and reactive to light  Cardiovascular:      Rate and Rhythm: Normal rate  Pulses: Normal pulses  Pulmonary:      Effort: Pulmonary effort is normal  No respiratory distress  Breath sounds: No wheezing  Abdominal:      General: There is no distension  Palpations: Abdomen is soft  Tenderness: There is no abdominal tenderness  There is no guarding  Musculoskeletal:      Cervical back: Normal range of motion  Right lower leg: No edema  Left lower leg: No edema  Neurological:      General: No focal deficit present  Mental Status: She is alert     Psychiatric:         Mood and Affect: Mood normal          Behavior: Behavior normal

## 2022-12-01 NOTE — ASSESSMENT & PLAN NOTE
- Repeat TSH noted to be 14 900 however patient reports that she has not been taking levothyroxine 137 mcg for 3 months    - Restart levothyroxine 137 mcg daily   - Repeat TSH in 8 weeks

## 2022-12-02 ENCOUNTER — TELEPHONE (OUTPATIENT)
Dept: FAMILY MEDICINE CLINIC | Facility: CLINIC | Age: 67
End: 2022-12-02

## 2022-12-02 NOTE — TELEPHONE ENCOUNTER
I left a message for the patient to schedule a BP check in 2 weeks with Dr Alon Schwab and then a 6 week f/u with the Dr after that

## 2022-12-16 ENCOUNTER — RA CDI HCC (OUTPATIENT)
Dept: OTHER | Facility: HOSPITAL | Age: 67
End: 2022-12-16

## 2022-12-16 NOTE — PROGRESS NOTES
[facility] [address]  coding opportunities       Chart reviewed, no opportunity found: CHART REVIEWED, NO OPPORTUNITY FOUND        Patients Insurance     Medicare Insurance: Medicare

## 2023-01-26 ENCOUNTER — OFFICE VISIT (OUTPATIENT)
Dept: FAMILY MEDICINE CLINIC | Facility: CLINIC | Age: 68
End: 2023-01-26

## 2023-01-26 VITALS
WEIGHT: 174 LBS | HEART RATE: 75 BPM | DIASTOLIC BLOOD PRESSURE: 78 MMHG | TEMPERATURE: 96.5 F | HEIGHT: 69 IN | OXYGEN SATURATION: 96 % | SYSTOLIC BLOOD PRESSURE: 130 MMHG | BODY MASS INDEX: 25.77 KG/M2

## 2023-01-26 DIAGNOSIS — Z86.010 PERSONAL HISTORY OF COLONIC POLYPS: ICD-10-CM

## 2023-01-26 DIAGNOSIS — E55.9 VITAMIN D DEFICIENCY: ICD-10-CM

## 2023-01-26 DIAGNOSIS — E78.2 MIXED HYPERLIPIDEMIA: ICD-10-CM

## 2023-01-26 DIAGNOSIS — Z12.11 SCREENING FOR COLORECTAL CANCER: ICD-10-CM

## 2023-01-26 DIAGNOSIS — L30.0 NUMMULAR ECZEMA: ICD-10-CM

## 2023-01-26 DIAGNOSIS — I10 ESSENTIAL HYPERTENSION: ICD-10-CM

## 2023-01-26 DIAGNOSIS — Z12.12 SCREENING FOR COLORECTAL CANCER: ICD-10-CM

## 2023-01-26 DIAGNOSIS — Z86.73 HISTORY OF CVA (CEREBROVASCULAR ACCIDENT): ICD-10-CM

## 2023-01-26 DIAGNOSIS — Z23 ENCOUNTER FOR IMMUNIZATION: ICD-10-CM

## 2023-01-26 DIAGNOSIS — Z13.6 SCREENING FOR CARDIOVASCULAR CONDITION: ICD-10-CM

## 2023-01-26 DIAGNOSIS — Z72.0 TOBACCO ABUSE: Chronic | ICD-10-CM

## 2023-01-26 DIAGNOSIS — Z00.00 MEDICARE ANNUAL WELLNESS VISIT, SUBSEQUENT: Primary | ICD-10-CM

## 2023-01-26 DIAGNOSIS — E03.9 ACQUIRED HYPOTHYROIDISM: ICD-10-CM

## 2023-01-26 NOTE — PROGRESS NOTES
Assessment and Plan:     Problem List Items Addressed This Visit        Endocrine    Acquired hypothyroidism - Primary    Relevant Orders    Lipid panel    CBC and differential    TSH, 3rd generation with Free T4 reflex    Vitamin D 25 hydroxy       Musculoskeletal and Integument    Nummular eczema    Relevant Orders    Lipid panel    CBC and differential    TSH, 3rd generation with Free T4 reflex    Vitamin D 25 hydroxy       Other    Mixed hyperlipidemia    Relevant Orders    Lipid panel    CBC and differential    TSH, 3rd generation with Free T4 reflex    Vitamin D 25 hydroxy    History of CVA (cerebrovascular accident)    Relevant Orders    Lipid panel    CBC and differential    TSH, 3rd generation with Free T4 reflex    Vitamin D 25 hydroxy   Other Visit Diagnoses     Medicare annual wellness visit, subsequent        Relevant Orders    Lipid panel    CBC and differential    TSH, 3rd generation with Free T4 reflex    Vitamin D 25 hydroxy    Screening for cardiovascular condition        Relevant Orders    Lipid panel    CBC and differential    TSH, 3rd generation with Free T4 reflex    Vitamin D 25 hydroxy    BMI 25 0-25 9,adult        Relevant Orders    Lipid panel    CBC and differential    TSH, 3rd generation with Free T4 reflex    Vitamin D 25 hydroxy    Vitamin D deficiency        Relevant Orders    Vitamin D 25 hydroxy           Preventive health issues were discussed with patient, and age appropriate screening tests were ordered as noted in patient's After Visit Summary  Personalized health advice and appropriate referrals for health education or preventive services given if needed, as noted in patient's After Visit Summary       History of Present Illness:     Patient presents for a Medicare Wellness Visit    HPI   Patient Care Team:  Dianna Lobato MD as PCP - General (Family Medicine)  Shelton Chowdary DO as PCP - [address] [place] [address] (RTE)  Shelton Chowdary DO as PCP - PCP-[employer] (RTE)  Raissa Baxter MD as Endoscopist     Review of Systems:     Review of Systems     Problem List:     Patient Active Problem List   Diagnosis   • Stage 3b chronic kidney disease ([facility] )   • Mixed hyperlipidemia   • Essential hypertension   • Chronic gout 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 tophus   • Acquired hypothyroidism   • Nummular eczema   • Tobacco abuse   • History of CVA (cerebrovascular accident)   • Left ovarian cyst   • Other proteinuria   • Compression fracture of L1 lumbar vertebra (HCC)   • Recurrent deep vein thrombosis (DVT) ([facility])   • Prediabetes   • Idiopathic peripheral neuropathy   • Transaminitis   • Insomnia due to mental condition   • Pain in both hands   • Edema of both legs      Past Medical and Surgical History:     Past Medical History:   Diagnosis Date   • Acute renal failure (ARF) ([facility] )    • Asthma    • Atopic dermatitis    • Chronic kidney disease     Stage 3   • Colon polyps    • Diabetes mellitus ([place] [address] )     type 2   • Disease of thyroid gland     hypothyroidism   • Diverticulosis    • Edema of both legs    • Gout    • Hemorrhoids    • Hypertension    • Nephrosclerosis    • Peripheral neuropathy    • Phlebitis    • Venous stasis dermatitis of both lower extremities    • Wrist joint pain     LEFT     Past Surgical History:   Procedure Laterality Date   • CERVIX SURGERY     • COLONOSCOPY N/A 5/31/2016    Procedure: COLONOSCOPY;  Surgeon: Raissa Baxter MD;  Location: [facility];   Service:    • VEIN SURGERY        Family History:     Family History   Problem Relation Age of Onset   • Diabetes Mother         Type 2 as per allscripts   • Heart attack Father    • Diabetes type II Sister    • Hypertension Other       Social History:     Social History     Socioeconomic History   • Marital status: /Civil Union     Spouse name: None   • Number of children: None   • Years of education: None   • Highest education level: None   Occupational History   • None   Tobacco Use • Smoking status: Every Day     Packs/day: 0 25     Years: 15 00     Pack years: 3 75     Types: Cigarettes     Start date: 0   • Smokeless tobacco: Never   • Tobacco comments:     5 cigarettes a day   Vaping Use   • Vaping Use: Never used   Substance and Sexual Activity   • Alcohol use: Yes     Comment: social   • Drug use: No   • Sexual activity: Yes     Partners: Male     Birth control/protection: Post-menopausal   Other Topics Concern   • None   Social History Narrative    Caffeine use    Daily caffeine consumption, 1 serving a day    Uses safety equipment- seatbelts     Social Determinants of Health     Financial Resource Strain: Low Risk    • Difficulty of Paying Living Expenses: Not hard at all   Food Insecurity: Not on file   Transportation Needs: No Transportation Needs   • Lack of Transportation (Medical): No   • Lack of Transportation (Non-Medical):  No   Physical Activity: Not on file   Stress: Not on file   Social Connections: Not on file   Intimate Partner Violence: Not on file   Housing Stability: Not on file      Medications and Allergies:     Current Outpatient Medications   Medication Sig Dispense Refill   • atorvastatin (LIPITOR) 40 mg tablet Take 1 tablet (40 mg total) by mouth daily with dinner 90 tablet 2   • DULoxetine (Cymbalta) 30 mg delayed release capsule Take 1 capsule (30 mg total) by mouth daily 90 capsule 0   • furosemide (LASIX) 40 mg tablet Take 1 tablet (40 mg total) by mouth daily 90 tablet 0   • hydrALAZINE (APRESOLINE) 100 MG tablet Take 1 tablet (100 mg total) by mouth 3 (three) times a day 270 tablet 0   • hydrOXYzine HCL (ATARAX) 25 mg tablet Take 1 tablet (25 mg total) by mouth daily at bedtime 30 tablet 1   • levothyroxine 137 mcg tablet Take 1 tablet (137 mcg total) by mouth every morning 90 tablet 0   • rivaroxaban (Xarelto) 20 mg tablet Take 1 tablet (20 mg total) by mouth daily with breakfast 90 tablet 0   • Ventolin  (90 Base) MCG/ACT inhaler Inhale 2 puffs every 6 (six) hours as needed for wheezing 18 g 3     No current facility-administered medications for this visit  Allergies   Allergen Reactions   • Neosporin [Neomycin-Bacitracin Zn-Polymyx]    • Niacin And Related    • Shellfish-Derived Products - Food Allergy      seafood      Immunizations:     Immunization History   Administered Date(s) Administered   • COVID-19 PFIZER VACCINE 0 3 ML IM 12/30/2021   • INFLUENZA 09/25/2012, 09/23/2015, 10/05/2018   • Influenza Quadrivalent 3 years and older 01/19/2022   • Influenza Quadrivalent, 6-35 Months IM 09/23/2015   • Influenza, recombinant, quadrivalent,injectable, preservative free 01/07/2020, 10/30/2020   • Pneumococcal 10/05/2018   • Pneumococcal Conjugate Vaccine 20-valent (Pcv20), Polysace 08/25/2022   • Pneumococcal Polysaccharide PPV23 10/05/2018   • Tdap 10/05/2018      Health Maintenance:         Topic Date Due   • Hepatitis C Screening  Never done   • HIV Screening  Never done   • Colorectal Cancer Screening  05/31/2021   • Breast Cancer Screening: Mammogram  06/16/2024         Topic Date Due   • COVID-19 Vaccine (2 - Pfizer series) 01/20/2022   • Influenza Vaccine (1) 09/01/2022      Medicare Screening Tests and Risk Assessments:     Aaron Zavala is here for her Subsequent Wellness visit  Last Medicare Wellness visit information reviewed, patient interviewed and updates made to the record today  Health Risk Assessment:   Patient rates overall health as very good  Patient feels that their physical health rating is same  Patient is satisfied with their life  Eyesight was rated as same  Hearing was rated as same  Patient feels that their emotional and mental health rating is same  Patients states they are never, rarely angry  Patient states they are never, rarely unusually tired/fatigued  Pain experienced in the last 7 days has been none  Patient states that she has experienced no weight loss or gain in last 6 months  Fall Risk Screening:    In the past year, patient has experienced: no history of falling in past year      Urinary Incontinence Screening:   Patient has not leaked urine accidently in the last six months  Home Safety:  Patient does not have trouble with stairs inside or outside of their home  Patient has working smoke alarms and has working carbon monoxide detector  Home safety hazards include: none  Nutrition:   Current diet is Regular  Medications:   Patient is not currently taking any over-the-counter supplements  Patient is able to manage medications  Activities of Daily Living (ADLs)/Instrumental Activities of Daily Living (IADLs):   Walk and transfer into and out of bed and chair?: Yes  Dress and groom yourself?: Yes    Bathe or shower yourself?: Yes    Feed yourself? Yes  Do your laundry/housekeeping?: Yes  Manage your money, pay your bills and track your expenses?: Yes  Make your own meals?: Yes    Do your own shopping?: Yes    Previous Hospitalizations:   Any hospitalizations or ED visits within the last 12 months?: No      PREVENTIVE SCREENINGS      Cardiovascular Screening:    General: Screening Not Indicated and History Lipid Disorder      Diabetes Screening:     General: Screening Current      Colorectal Cancer Screening:     General: Screening Current      Breast Cancer Screening:     General: Screening Current      Cervical Cancer Screening:    General: Screening Not Indicated      Lung Cancer Screening:     General: Screening Not Indicated    Screening, Brief Intervention, and Referral to Treatment (SBIRT)    Screening      AUDIT-C Screenin) How often did you have a drink containing alcohol in the past year? never  2) How many drinks did you have on a typical day when you were drinking in the past year? 0  3) How often did you have 6 or more drinks on one occasion in the past year? never    AUDIT-C Score: 0  Interpretation: Score 0-2 (female): Negative screen for alcohol misuse    Single Item Drug Screening:   How often have you used an illegal drug (including marijuana) or a prescription medication for non-medical reasons in the past year? never    Single Item Drug Screen Score: 0  Interpretation: Negative screen for possible drug use disorder    No results found       Physical Exam:     /78 (BP Location: Left arm, Patient Position: Sitting, Cuff Size: Standard)   Pulse 75   Temp (!) 96 5 °F (35 8 °C) (Tympanic)   Ht 5' 9" (1 753 m)   Wt 78 9 kg (174 lb)   SpO2 96%   BMI 25 70 kg/m²     Physical Exam     Mike Ashraf MD

## 2023-01-26 NOTE — PATIENT INSTRUCTIONS
Please get your labwork done fasting  Do not eat/drink for about 8-12 hours prior to getting the labwork done, however you may drink water or black coffee while you are fasting  Please use a [facility] lab if possible, as we receive the lab results more quickly  We will notify you of your labwork results even if they are normal   Please contact us if you do not hear about your lab results after one week  Low Carb Recommendations:  Please follow a low carbohydrate, high protein diet  [employer] is a good hugo/computer program to keep food logs  Your meals should be less than 30 grams of carbohydrates  Your snacks should be less than 15 grams of carbohydrates  You should drink at least 64 ounces of water daily  You should walk at least 30 minutes daily  Please stop smoking!!! Follow up in 3 months  Medicare Preventive Visit Patient Instructions  Thank you for completing your Welcome to Medicare Visit or Medicare Annual Wellness Visit today  Your next wellness visit will be due in one year (1/27/2024)  The screening/preventive services that you may require over the next 5-10 years are detailed below  Some tests may not apply to you based off risk factors and/or age  Screening tests ordered at today's visit but not completed yet may show as past due  Also, please note that scanned in results may not display below  Preventive Screenings:  Service Recommendations Previous Testing/Comments   Colorectal Cancer Screening  * Colonoscopy    * Fecal Occult Blood Test (FOBT)/Fecal Immunochemical Test (FIT)  * Fecal DNA/Cologuard Test  * Flexible Sigmoidoscopy Age: 39-70 years old   Colonoscopy: every 10 years (may be performed more frequently if at higher risk)  OR  FOBT/FIT: every 1 year  OR  Cologuard: every 3 years  OR  Sigmoidoscopy: every 5 years  Screening may be recommended earlier than age 39 if at higher risk for colorectal cancer   Also, an individualized decision between you and your healthcare provider will decide whether screening between the ages of 74-80 would be appropriate  Colonoscopy: 05/31/2016  FOBT/FIT: Not on file  Cologuard: Not on file  Sigmoidoscopy: Not on file    Screening Current     Breast Cancer Screening Age: 36 years old  Frequency: every 1-2 years  Not required if history of left and right mastectomy Mammogram: 06/16/2022    Screening Current   Cervical Cancer Screening Between the ages of 21-29, pap smear recommended once every 3 years  Between the ages of 33-67, can perform pap smear with HPV co-testing every 5 years  Recommendations may differ for women with a history of total hysterectomy, cervical cancer, or abnormal pap smears in past  Pap Smear: 07/21/2020    Screening Not Indicated   Hepatitis C Screening Once for adults born between 1945 and 1965  More frequently in patients at high risk for Hepatitis C Hep C Antibody: Not on file        Diabetes Screening 1-2 times per year if you're at risk for diabetes or have pre-diabetes Fasting glucose: 102 mg/dL (11/29/2022)  A1C: 6 3 % (1/31/2022)  Screening Current   Cholesterol Screening Once every 5 years if you don't have a lipid disorder  May order more often based on risk factors  Lipid panel: 01/31/2022    Screening Not Indicated  History Lipid Disorder     Other Preventive Screenings Covered by Medicare:  Abdominal Aortic Aneurysm (AAA) Screening: covered once if your at risk  You're considered to be at risk if you have a family history of AAA  Lung Cancer Screening: covers low dose CT scan once per year if you meet all of the following conditions: (1) Age 50-69; (2) No signs or symptoms of lung cancer; (3) Current smoker or have quit smoking within the last 15 years; (4) You have a tobacco smoking history of at least 20 pack years (packs per day multiplied by number of years you smoked); (5) You get a written order from a healthcare provider    Glaucoma Screening: covered annually if you're considered high risk: (1) You have diabetes OR (2) Family history of glaucoma OR (3)  aged 48 and older OR (4)  American aged 72 and older  Osteoporosis Screening: covered every 2 years if you meet one of the following conditions: (1) You're estrogen deficient and at risk for osteoporosis based off medical history and other findings; (2) Have a vertebral abnormality; (3) On glucocorticoid therapy for more than 3 months; (4) Have primary hyperparathyroidism; (5) On osteoporosis medications and need to assess response to drug therapy  Last bone density test (DXA Scan): 06/22/2022  HIV Screening: covered annually if you're between the age of 12-76  Also covered annually if you are younger than 13 and older than 72 with risk factors for HIV infection  For pregnant patients, it is covered up to 3 times per pregnancy  Immunizations:  Immunization Recommendations   Influenza Vaccine Annual influenza vaccination during flu season is recommended for all persons aged >= 6 months who do not have contraindications   Pneumococcal Vaccine   * Pneumococcal conjugate vaccine = PCV13 (Prevnar 13), PCV15 (Vaxneuvance), PCV20 (Prevnar 20)  * Pneumococcal polysaccharide vaccine = PPSV23 (Pneumovax) Adults 25-60 years old: 1-3 doses may be recommended based on certain risk factors  Adults 72 years old: 1-2 doses may be recommended based off what pneumonia vaccine you previously received   Hepatitis B Vaccine 3 dose series if at intermediate or high risk (ex: diabetes, end stage renal disease, liver disease)   Tetanus (Td) Vaccine - COST NOT COVERED BY MEDICARE PART B Following completion of primary series, a booster dose should be given every 10 years to maintain immunity against tetanus  Td may also be given as tetanus wound prophylaxis  Tdap Vaccine - COST NOT COVERED BY MEDICARE PART B Recommended at least once for all adults  For pregnant patients, recommended with each pregnancy     Shingles Vaccine (Shingrix) - COST NOT COVERED BY MEDICARE PART B  2 shot series recommended in those aged 48 and above     Health Maintenance Due:      Topic Date Due    Hepatitis C Screening  Never done    HIV Screening  Never done    Colorectal Cancer Screening  05/31/2021    Breast Cancer Screening: Mammogram  06/16/2024     Immunizations Due:      Topic Date Due    COVID-19 Vaccine (2 - Pfizer series) 01/20/2022    Influenza Vaccine (1) 09/01/2022     Advance Directives   What are advance directives? Advance directives are legal documents that state your wishes and plans for medical care  These plans are made ahead of time in case you lose your ability to make decisions for yourself  Advance directives can apply to any medical decision, such as the treatments you want, and if you want to donate organs  What are the types of advance directives? There are many types of advance directives, and each state has rules about how to use them  You may choose a combination of any of the following:  Living will: This is a written record of the treatment you want  You can also choose which treatments you do not want, which to limit, and which to stop at a certain time  This includes surgery, medicine, IV fluid, and tube feedings  Durable power of  for healthcare [facility]): This is a written record that states who you want to make healthcare choices for you when you are unable to make them for yourself  This person, called a proxy, is usually a family member or a friend  You may choose more than 1 proxy  Do not resuscitate (DNR) order:  A DNR order is used in case your heart stops beating or you stop breathing  It is a request not to have certain forms of treatment, such as CPR  A DNR order may be included in other types of advance directives  Medical directive: This covers the care that you want if you are in a coma, near death, or unable to make decisions for yourself  You can list the treatments you want for each condition   Treatment may include pain medicine, surgery, blood transfusions, dialysis, IV or tube feedings, and a ventilator (breathing machine)  Values history: This document has questions about your views, beliefs, and how you feel and think about life  This information can help others choose the care that you would choose  Why are advance directives important? An advance directive helps you control your care  Although spoken wishes may be used, it is better to have your wishes written down  Spoken wishes can be misunderstood, or not followed  Treatments may be given even if you do not want them  An advance directive may make it easier for your family to make difficult choices about your care  Cigarette Smoking and Your Health   Risks to your health if you smoke:  Nicotine and other chemicals found in tobacco damage every cell in your body  Even if you are a light smoker, you have an increased risk for cancer, heart disease, and lung disease  If you are pregnant or have diabetes, smoking increases your risk for complications  Benefits to your health if you stop smoking: You decrease respiratory symptoms such as coughing, wheezing, and shortness of breath  You reduce your risk for cancers of the lung, mouth, throat, kidney, bladder, pancreas, stomach, and cervix  If you already have cancer, you increase the benefits of chemotherapy  You also reduce your risk for cancer returning or a second cancer from developing  You reduce your risk for heart disease, blood clots, heart attack, and stroke  You reduce your risk for lung infections, and diseases such as pneumonia, asthma, chronic bronchitis, and emphysema  Your circulation improves  More oxygen can be delivered to your body  If you have diabetes, you lower your risk for complications, such as kidney, artery, and eye diseases  You also lower your risk for nerve damage  Nerve damage can lead to amputations, poor vision, and blindness    You improve your body's ability to heal and to fight infections  For more information and support to stop smoking:   [employer]  Phone: 3- 938 - 427-5781  Web Address: www [employer]  Weight Management   Why it is important to manage your weight:  Being overweight increases your risk of health conditions such as heart disease, high blood pressure, type 2 diabetes, and certain types of cancer  It can also increase your risk for osteoarthritis, sleep apnea, and other respiratory problems  Aim for a slow, steady weight loss  Even a small amount of weight loss can lower your risk of health problems  How to lose weight safely:  A safe and healthy way to lose weight is to eat fewer calories and get regular exercise  You can lose up about 1 pound a week by decreasing the number of calories you eat by 500 calories each day  Healthy meal plan for weight management:  A healthy meal plan includes a variety of foods, contains fewer calories, and helps you stay healthy  A healthy meal plan includes the following:  Eat whole-grain foods more often  A healthy meal plan should contain fiber  Fiber is the part of grains, fruits, and vegetables that is not broken down by your body  Whole-grain foods are healthy and provide extra fiber in your diet  Some examples of whole-grain foods are whole-wheat breads and pastas, oatmeal, brown rice, and bulgur  Eat a variety of vegetables every day  Include dark, leafy greens such as spinach, kale, diandra greens, and mustard greens  Eat yellow and orange vegetables such as carrots, sweet potatoes, and winter squash  Eat a variety of fruits every day  Choose fresh or canned fruit (canned in its own juice or light syrup) instead of juice  Fruit juice has very little or no fiber  Eat low-fat dairy foods  Drink fat-free (skim) milk or 1% milk  Eat fat-free yogurt and low-fat cottage cheese  Try low-fat cheeses such as mozzarella and other reduced-fat cheeses    Choose meat and other protein foods that are low in fat   Choose beans or other legumes such as split peas or lentils  Choose fish, skinless poultry (chicken or turkey), or lean cuts of red meat (beef or pork)  Before you cook meat or poultry, cut off any visible fat  Use less fat and oil  Try baking foods instead of frying them  Add less fat, such as margarine, sour cream, regular salad dressing and mayonnaise to foods  Eat fewer high-fat foods  Some examples of high-fat foods include french fries, doughnuts, ice cream, and cakes  Eat fewer sweets  Limit foods and drinks that are high in sugar  This includes candy, cookies, regular soda, and sweetened drinks  Exercise:  Exercise at least 30 minutes per day on most days of the week  Some examples of exercise include walking, biking, dancing, and swimming  You can also fit in more physical activity by taking the stairs instead of the elevator or parking farther away from stores  Ask your healthcare provider about the best exercise plan for you  © Copyright [employer] 2018 Information is for End User's use only and may not be sold, redistributed or otherwise used for commercial purposes   All illustrations and images included in CareNotes® are the copyrighted property of [employer]  or [address] [place] [address]

## 2023-02-08 NOTE — PROGRESS NOTES
Subjective:    HPI  Nita Oseguera is a 74 y o  female here today for:  Chief Complaint   Patient presents with   • Medicare Wellness Visit     74year old female  • Hypertension     BP recheck      ---Above per clinical staff & reviewed  ---  HPI:  72yof here for well exam  Had elevated blood pressure in past, good today   Discussed stopping smoking, pt not interested currently  Due for labs  Will get flu shot  Due for colon cancer screening  No concerns today  Questions reviewed, no problems    PHQ-2/9 Depression Screening    Little interest or pleasure in doing things: 0 - not at all  Feeling down, depressed, or hopeless: 0 - not at all  PHQ-2 Score: 0  PHQ-2 Interpretation: Negative depression screen           The following portions of the patient's history were reviewed and updated as appropriate: allergies, current medications, past family history, past medical history, past social history, past surgical history and problem list     Past Medical History:   Diagnosis Date   • Acute renal failure (ARF) ([facility] )    • Asthma    • Atopic dermatitis    • Chronic kidney disease     Stage 3   • Colon polyps    • Diabetes mellitus ([facility] )     type 2   • Disease of thyroid gland     hypothyroidism   • Diverticulosis    • Edema of both legs    • Gout    • Hemorrhoids    • Hypertension    • Nephrosclerosis    • Peripheral neuropathy    • Phlebitis    • Venous stasis dermatitis of both lower extremities    • Wrist joint pain     LEFT       Past Surgical History:   Procedure Laterality Date   • CERVIX SURGERY     • COLONOSCOPY N/A 5/31/2016    Procedure: COLONOSCOPY;  Surgeon: Audi Ponce MD;  Location: [facility];   Service:    • VEIN SURGERY         Social History     Socioeconomic History   • Marital status: /Civil Union     Spouse name: None   • Number of children: None   • Years of education: None   • Highest education level: None   Occupational History   • None   Tobacco Use   • Smoking status: Every Day Packs/day: 0 25     Years: 15 00     Pack years: 3 75     Types: Cigarettes     Start date: 1977   • Smokeless tobacco: Never   • Tobacco comments:     5 cigarettes a day   Vaping Use   • Vaping Use: Never used   Substance and Sexual Activity   • Alcohol use: Yes     Comment: social   • Drug use: No   • Sexual activity: Yes     Partners: Male     Birth control/protection: Post-menopausal   Other Topics Concern   • None   Social History Narrative    Caffeine use    Daily caffeine consumption, 1 serving a day    Uses safety equipment- seatbelts     Social Determinants of Health     Financial Resource Strain: Low Risk    • Difficulty of Paying Living Expenses: Not hard at all   Food Insecurity: Not on file   Transportation Needs: No Transportation Needs   • Lack of Transportation (Medical): No   • Lack of Transportation (Non-Medical):  No   Physical Activity: Not on file   Stress: Not on file   Social Connections: Not on file   Intimate Partner Violence: Not on file   Housing Stability: Not on file       Current Outpatient Medications   Medication Sig Dispense Refill   • atorvastatin (LIPITOR) 40 mg tablet Take 1 tablet (40 mg total) by mouth daily with dinner 90 tablet 2   • DULoxetine (Cymbalta) 30 mg delayed release capsule Take 1 capsule (30 mg total) by mouth daily 90 capsule 0   • furosemide (LASIX) 40 mg tablet Take 1 tablet (40 mg total) by mouth daily 90 tablet 0   • hydrALAZINE (APRESOLINE) 100 MG tablet Take 1 tablet (100 mg total) by mouth 3 (three) times a day 270 tablet 0   • hydrOXYzine HCL (ATARAX) 25 mg tablet Take 1 tablet (25 mg total) by mouth daily at bedtime 30 tablet 1   • levothyroxine 137 mcg tablet Take 1 tablet (137 mcg total) by mouth every morning 90 tablet 0   • rivaroxaban (Xarelto) 20 mg tablet Take 1 tablet (20 mg total) by mouth daily with breakfast 90 tablet 0   • Ventolin  (90 Base) MCG/ACT inhaler Inhale 2 puffs every 6 (six) hours as needed for wheezing 18 g 3     No current facility-administered medications for this visit  Review of Systems   Constitutional: Negative  Negative for chills and fever  HENT: Negative  Negative for ear pain and sore throat  Eyes: Negative for pain and visual disturbance  Respiratory: Negative  Negative for cough and shortness of breath  Cardiovascular: Negative  Negative for chest pain and palpitations  Gastrointestinal: Negative  Negative for abdominal pain and vomiting  Genitourinary: Negative  Negative for dysuria and hematuria  Musculoskeletal: Negative for arthralgias and back pain  Skin: Negative for color change and rash  Neurological: Negative  Negative for seizures and syncope  Psychiatric/Behavioral: Negative  Objective:    /78 (BP Location: Left arm, Patient Position: Sitting, Cuff Size: Standard)   Pulse 75   Temp (!) 96 5 °F (35 8 °C) (Tympanic)   Ht 5' 9" (1 753 m)   Wt 78 9 kg (174 lb)   SpO2 96%   BMI 25 70 kg/m²   Wt Readings from Last 3 Encounters:   01/26/23 78 9 kg (174 lb)   11/30/22 78 kg (172 lb)   08/25/22 76 2 kg (168 lb)     BP Readings from Last 3 Encounters:   01/26/23 130/78   11/30/22 (!) 176/88   08/25/22 140/98       Lab Results   Component Value Date    WBC 6 48 01/31/2022    HGB 11 0 (L) 01/31/2022    HCT 35 4 01/31/2022     01/31/2022    CHOL 234 09/23/2015    TRIG 74 01/31/2022    HDL 77 01/31/2022     (H) 11/29/2022     (H) 11/29/2022     01/06/2016    K 3 8 11/29/2022     11/29/2022    CREATININE 1 38 (H) 11/29/2022    BUN 27 (H) 11/29/2022    CO2 30 11/29/2022    TSH 27 23 (H) 01/28/2021    INR 1 31 (H) 06/19/2021    GLUF 102 (H) 11/29/2022    HGBA1C 6 3 (H) 01/31/2022       Physical Exam  Vitals and nursing note reviewed  Constitutional:       Appearance: Normal appearance  She is well-developed  HENT:      Head: Normocephalic and atraumatic        Right Ear: Tympanic membrane and external ear normal       Left Ear: Tympanic membrane and external ear normal       Nose: Nose normal       Mouth/Throat:      Mouth: Mucous membranes are moist    Eyes:      Conjunctiva/sclera: Conjunctivae normal       Pupils: Pupils are equal, round, and reactive to light  Neck:      Thyroid: No thyromegaly  Comments: No carotid bruits  Cardiovascular:      Rate and Rhythm: Normal rate and regular rhythm  Heart sounds: Normal heart sounds  No murmur heard  Pulmonary:      Effort: Pulmonary effort is normal  No respiratory distress  Breath sounds: Normal breath sounds  Abdominal:      General: Bowel sounds are normal  There is no distension  Palpations: Abdomen is soft  Tenderness: There is no abdominal tenderness  Musculoskeletal:         General: Normal range of motion  Cervical back: Normal range of motion and neck supple  Skin:     General: Skin is warm  Capillary Refill: Capillary refill takes less than 2 seconds  Neurological:      Mental Status: She is alert and oriented to person, place, and time  Cranial Nerves: No cranial nerve deficit  Psychiatric:         Mood and Affect: Mood normal          Thought Content: Thought content normal                BMI Counseling: Body mass index is 25 7 kg/m²  The BMI is above normal  Nutrition recommendations include moderation in carbohydrate intake and increasing intake of lean protein  Exercise recommendations include moderate physical activity 150 minutes/week  No pharmacotherapy was ordered  Patient referred to PCP  Rationale for BMI follow-up plan is due to patient being overweight or obese  Depression Screening and Follow-up Plan: Patient was screened for depression during today's encounter  They screened negative with a PHQ-2 score of 0  Assessment/Plan:   Maribel Gagnon was seen today for medicare wellness visit and hypertension  Diagnoses and all orders for this visit:    Medicare annual wellness visit, subsequent  -     Lipid panel;  Future  - CBC and differential; Future  -     TSH, 3rd generation with Free T4 reflex; Future  -     Vitamin D 25 hydroxy; Future    Screening for cardiovascular condition  -     Lipid panel; Future  -     CBC and differential; Future  -     TSH, 3rd generation with Free T4 reflex; Future  -     Vitamin D 25 hydroxy; Future    BMI 25 0-25 9,adult  -     Lipid panel; Future  -     CBC and differential; Future  -     TSH, 3rd generation with Free T4 reflex; Future  -     Vitamin D 25 hydroxy; Future    Acquired hypothyroidism  -     Lipid panel; Future  -     CBC and differential; Future  -     TSH, 3rd generation with Free T4 reflex; Future  -     Vitamin D 25 hydroxy; Future    Nummular eczema  -     Lipid panel; Future  -     CBC and differential; Future  -     TSH, 3rd generation with Free T4 reflex; Future  -     Vitamin D 25 hydroxy; Future    Mixed hyperlipidemia  -     Lipid panel; Future  -     CBC and differential; Future  -     TSH, 3rd generation with Free T4 reflex; Future  -     Vitamin D 25 hydroxy; Future    History of CVA (cerebrovascular accident)  -     Lipid panel; Future  -     CBC and differential; Future  -     TSH, 3rd generation with Free T4 reflex; Future  -     Vitamin D 25 hydroxy; Future    Vitamin D deficiency  -     Vitamin D 25 hydroxy; Future    Encounter for immunization  -     Cancel: influenza vaccine, high-dose, PF 0 7 mL (FLUZONE HIGH-DOSE)  -     influenza vaccine, high-dose, PF 0 5 mL    Essential hypertension    Tobacco abuse    Screening for colorectal cancer  -     Ambulatory referral to Colorectal Surgery; Future    Personal history of colonic polyps  -     Ambulatory referral to Colorectal Surgery; Future      Return in about 3 months (around 4/26/2023) for Recheck  Patient Instructions     Please get your labwork done fasting  Do not eat/drink for about 8-12 hours prior to getting the labwork done, however you may drink water or black coffee while you are fasting    Please use a [facility] lab if possible, as we receive the lab results more quickly  We will notify you of your labwork results even if they are normal   Please contact us if you do not hear about your lab results after one week  Low Carb Recommendations:  Please follow a low carbohydrate, high protein diet  [employer] is a good hugo/computer program to keep food logs  Your meals should be less than 30 grams of carbohydrates  Your snacks should be less than 15 grams of carbohydrates  You should drink at least 64 ounces of water daily  You should walk at least 30 minutes daily  Please stop smoking!!! Follow up in 3 months  Medicare Preventive Visit Patient Instructions  Thank you for completing your Welcome to Medicare Visit or Medicare Annual Wellness Visit today  Your next wellness visit will be due in one year (1/27/2024)  The screening/preventive services that you may require over the next 5-10 years are detailed below  Some tests may not apply to you based off risk factors and/or age  Screening tests ordered at today's visit but not completed yet may show as past due  Also, please note that scanned in results may not display below  Preventive Screenings:  Service Recommendations Previous Testing/Comments   Colorectal Cancer Screening  * Colonoscopy    * Fecal Occult Blood Test (FOBT)/Fecal Immunochemical Test (FIT)  * Fecal DNA/Cologuard Test  * Flexible Sigmoidoscopy Age: 39-70 years old   Colonoscopy: every 10 years (may be performed more frequently if at higher risk)  OR  FOBT/FIT: every 1 year  OR  Cologuard: every 3 years  OR  Sigmoidoscopy: every 5 years  Screening may be recommended earlier than age 39 if at higher risk for colorectal cancer  Also, an individualized decision between you and your healthcare provider will decide whether screening between the ages of 74-80 would be appropriate   Colonoscopy: 05/31/2016  FOBT/FIT: Not on file  Cologuard: Not on file  Sigmoidoscopy: Not on file    Screening Current     Breast Cancer Screening Age: 36 years old  Frequency: every 1-2 years  Not required if history of left and right mastectomy Mammogram: 06/16/2022    Screening Current   Cervical Cancer Screening Between the ages of 21-29, pap smear recommended once every 3 years  Between the ages of 33-67, can perform pap smear with HPV co-testing every 5 years  Recommendations may differ for women with a history of total hysterectomy, cervical cancer, or abnormal pap smears in past  Pap Smear: 07/21/2020    Screening Not Indicated   Hepatitis C Screening Once for adults born between 1945 and 1965  More frequently in patients at high risk for Hepatitis C Hep C Antibody: Not on file        Diabetes Screening 1-2 times per year if you're at risk for diabetes or have pre-diabetes Fasting glucose: 102 mg/dL (11/29/2022)  A1C: 6 3 % (1/31/2022)  Screening Current   Cholesterol Screening Once every 5 years if you don't have a lipid disorder  May order more often based on risk factors  Lipid panel: 01/31/2022    Screening Not Indicated  History Lipid Disorder     Other Preventive Screenings Covered by Medicare:  1  Abdominal Aortic Aneurysm (AAA) Screening: covered once if your at risk  You're considered to be at risk if you have a family history of AAA  2  Lung Cancer Screening: covers low dose CT scan once per year if you meet all of the following conditions: (1) Age 50-69; (2) No signs or symptoms of lung cancer; (3) Current smoker or have quit smoking within the last 15 years; (4) You have a tobacco smoking history of at least 20 pack years (packs per day multiplied by number of years you smoked); (5) You get a written order from a healthcare provider  3  Glaucoma Screening: covered annually if you're considered high risk: (1) You have diabetes OR (2) Family history of glaucoma OR (3)  aged 48 and older OR (3)  American aged 72 and older  3   Osteoporosis Screening: covered every 2 years if you meet one of the following conditions: (1) You're estrogen deficient and at risk for osteoporosis based off medical history and other findings; (2) Have a vertebral abnormality; (3) On glucocorticoid therapy for more than 3 months; (4) Have primary hyperparathyroidism; (5) On osteoporosis medications and need to assess response to drug therapy  · Last bone density test (DXA Scan): 06/22/2022   5  HIV Screening: covered annually if you're between the age of 15-65  Also covered annually if you are younger than 13 and older than 72 with risk factors for HIV infection  For pregnant patients, it is covered up to 3 times per pregnancy  Immunizations:  Immunization Recommendations   Influenza Vaccine Annual influenza vaccination during flu season is recommended for all persons aged >= 6 months who do not have contraindications   Pneumococcal Vaccine   * Pneumococcal conjugate vaccine = PCV13 (Prevnar 13), PCV15 (Vaxneuvance), PCV20 (Prevnar 20)  * Pneumococcal polysaccharide vaccine = PPSV23 (Pneumovax) Adults 25-60 years old: 1-3 doses may be recommended based on certain risk factors  Adults 72 years old: 1-2 doses may be recommended based off what pneumonia vaccine you previously received   Hepatitis B Vaccine 3 dose series if at intermediate or high risk (ex: diabetes, end stage renal disease, liver disease)   Tetanus (Td) Vaccine - COST NOT COVERED BY MEDICARE PART B Following completion of primary series, a booster dose should be given every 10 years to maintain immunity against tetanus  Td may also be given as tetanus wound prophylaxis  Tdap Vaccine - COST NOT COVERED BY MEDICARE PART B Recommended at least once for all adults  For pregnant patients, recommended with each pregnancy     Shingles Vaccine (Shingrix) - COST NOT COVERED BY MEDICARE PART B  2 shot series recommended in those aged 48 and above     Health Maintenance Due:      Topic Date Due   • Hepatitis C Screening  Never done • HIV Screening  Never done   • Colorectal Cancer Screening  05/31/2021   • Breast Cancer Screening: Mammogram  06/16/2024     Immunizations Due:      Topic Date Due   • COVID-19 Vaccine (2 - Pfizer series) 01/20/2022   • Influenza Vaccine (1) 09/01/2022     Advance Directives   What are advance directives? Advance directives are legal documents that state your wishes and plans for medical care  These plans are made ahead of time in case you lose your ability to make decisions for yourself  Advance directives can apply to any medical decision, such as the treatments you want, and if you want to donate organs  What are the types of advance directives? There are many types of advance directives, and each state has rules about how to use them  You may choose a combination of any of the following:  · Living will: This is a written record of the treatment you want  You can also choose which treatments you do not want, which to limit, and which to stop at a certain time  This includes surgery, medicine, IV fluid, and tube feedings  · Durable power of  for healthcare [facility]): This is a written record that states who you want to make healthcare choices for you when you are unable to make them for yourself  This person, called a proxy, is usually a family member or a friend  You may choose more than 1 proxy  · Do not resuscitate (DNR) order:  A DNR order is used in case your heart stops beating or you stop breathing  It is a request not to have certain forms of treatment, such as CPR  A DNR order may be included in other types of advance directives  · Medical directive: This covers the care that you want if you are in a coma, near death, or unable to make decisions for yourself  You can list the treatments you want for each condition  Treatment may include pain medicine, surgery, blood transfusions, dialysis, IV or tube feedings, and a ventilator (breathing machine)  · Values history:   This document has questions about your views, beliefs, and how you feel and think about life  This information can help others choose the care that you would choose  Why are advance directives important? An advance directive helps you control your care  Although spoken wishes may be used, it is better to have your wishes written down  Spoken wishes can be misunderstood, or not followed  Treatments may be given even if you do not want them  An advance directive may make it easier for your family to make difficult choices about your care  Cigarette Smoking and Your Health   Risks to your health if you smoke:  Nicotine and other chemicals found in tobacco damage every cell in your body  Even if you are a light smoker, you have an increased risk for cancer, heart disease, and lung disease  If you are pregnant or have diabetes, smoking increases your risk for complications  Benefits to your health if you stop smoking:   · You decrease respiratory symptoms such as coughing, wheezing, and shortness of breath  · You reduce your risk for cancers of the lung, mouth, throat, kidney, bladder, pancreas, stomach, and cervix  If you already have cancer, you increase the benefits of chemotherapy  You also reduce your risk for cancer returning or a second cancer from developing  · You reduce your risk for heart disease, blood clots, heart attack, and stroke  · You reduce your risk for lung infections, and diseases such as pneumonia, asthma, chronic bronchitis, and emphysema  · Your circulation improves  More oxygen can be delivered to your body  If you have diabetes, you lower your risk for complications, such as kidney, artery, and eye diseases  You also lower your risk for nerve damage  Nerve damage can lead to amputations, poor vision, and blindness  · You improve your body's ability to heal and to fight infections  For more information and support to stop smoking:   · Smokefree  gov  Phone: [address] [employer]  Web Address: www smokefree  gov  Weight Management   Why it is important to manage your weight:  Being overweight increases your risk of health conditions such as heart disease, high blood pressure, type 2 diabetes, and certain types of cancer  It can also increase your risk for osteoarthritis, sleep apnea, and other respiratory problems  Aim for a slow, steady weight loss  Even a small amount of weight loss can lower your risk of health problems  How to lose weight safely:  A safe and healthy way to lose weight is to eat fewer calories and get regular exercise  You can lose up about 1 pound a week by decreasing the number of calories you eat by 500 calories each day  Healthy meal plan for weight management:  A healthy meal plan includes a variety of foods, contains fewer calories, and helps you stay healthy  A healthy meal plan includes the following:  · Eat whole-grain foods more often  A healthy meal plan should contain fiber  Fiber is the part of grains, fruits, and vegetables that is not broken down by your body  Whole-grain foods are healthy and provide extra fiber in your diet  Some examples of whole-grain foods are whole-wheat breads and pastas, oatmeal, brown rice, and bulgur  · Eat a variety of vegetables every day  Include dark, leafy greens such as spinach, kale, diandra greens, and mustard greens  Eat yellow and orange vegetables such as carrots, sweet potatoes, and winter squash  · Eat a variety of fruits every day  Choose fresh or canned fruit (canned in its own juice or light syrup) instead of juice  Fruit juice has very little or no fiber  · Eat low-fat dairy foods  Drink fat-free (skim) milk or 1% milk  Eat fat-free yogurt and low-fat cottage cheese  Try low-fat cheeses such as mozzarella and other reduced-fat cheeses  · Choose meat and other protein foods that are low in fat  Choose beans or other legumes such as split peas or lentils   Choose fish, skinless poultry (chicken or turkey), or lean cuts of red meat (beef or pork)  Before you cook meat or poultry, cut off any visible fat  · Use less fat and oil  Try baking foods instead of frying them  Add less fat, such as margarine, sour cream, regular salad dressing and mayonnaise to foods  Eat fewer high-fat foods  Some examples of high-fat foods include french fries, doughnuts, ice cream, and cakes  · Eat fewer sweets  Limit foods and drinks that are high in sugar  This includes candy, cookies, regular soda, and sweetened drinks  Exercise:  Exercise at least 30 minutes per day on most days of the week  Some examples of exercise include walking, biking, dancing, and swimming  You can also fit in more physical activity by taking the stairs instead of the elevator or parking farther away from stores  Ask your healthcare provider about the best exercise plan for you  © Copyright [employer] 2018 Information is for End User's use only and may not be sold, redistributed or otherwise used for commercial purposes   All illustrations and images included in CareNotes® are the copyrighted property of [employer]  or [address] [place] [address]

## 2023-02-10 DIAGNOSIS — G60.9 IDIOPATHIC PERIPHERAL NEUROPATHY: ICD-10-CM

## 2023-02-10 DIAGNOSIS — E78.2 MIXED HYPERLIPIDEMIA: ICD-10-CM

## 2023-02-10 DIAGNOSIS — L30.0 NUMMULAR ECZEMA: Primary | ICD-10-CM

## 2023-02-10 DIAGNOSIS — I82.462 ACUTE DEEP VEIN THROMBOSIS (DVT) OF CALF MUSCLE VEIN OF LEFT LOWER EXTREMITY (HCC): ICD-10-CM

## 2023-02-10 DIAGNOSIS — I10 ESSENTIAL HYPERTENSION: ICD-10-CM

## 2023-02-10 DIAGNOSIS — J45.40 MODERATE PERSISTENT ASTHMA WITHOUT COMPLICATION: ICD-10-CM

## 2023-02-10 DIAGNOSIS — E03.9 ACQUIRED HYPOTHYROIDISM: ICD-10-CM

## 2023-02-10 RX ORDER — DULOXETIN HYDROCHLORIDE 30 MG/1
30 CAPSULE, DELAYED RELEASE ORAL DAILY
Qty: 90 CAPSULE | Refills: 1 | Status: SHIPPED | OUTPATIENT
Start: 2023-02-10

## 2023-02-10 RX ORDER — LEVOTHYROXINE SODIUM 137 UG/1
137 TABLET ORAL EVERY MORNING
Qty: 90 TABLET | Refills: 0 | Status: SHIPPED | OUTPATIENT
Start: 2023-02-10

## 2023-02-10 RX ORDER — FUROSEMIDE 40 MG/1
40 TABLET ORAL DAILY
Qty: 90 TABLET | Refills: 1 | Status: SHIPPED | OUTPATIENT
Start: 2023-02-10

## 2023-02-10 RX ORDER — ATORVASTATIN CALCIUM 40 MG/1
40 TABLET, FILM COATED ORAL
Qty: 90 TABLET | Refills: 1 | Status: SHIPPED | OUTPATIENT
Start: 2023-02-10

## 2023-02-10 NOTE — TELEPHONE ENCOUNTER
Patient was also asking for the Triamcinolone cream 1%  This is not on her list   She says she does use this

## 2023-03-03 DIAGNOSIS — J45.20 MILD INTERMITTENT ASTHMA WITHOUT COMPLICATION: Primary | ICD-10-CM

## 2023-03-03 RX ORDER — ALBUTEROL SULFATE 90 UG/1
2 AEROSOL, METERED RESPIRATORY (INHALATION) EVERY 6 HOURS PRN
Qty: 8.5 G | Refills: 2 | Status: SHIPPED | OUTPATIENT
Start: 2023-03-03

## 2023-03-03 NOTE — ASSESSMENT & PLAN NOTE
- Discussed tobacco cessation and  the effects of smoking on cardiovascular system, skin and lungs  - Patient verbalized understanding but is not ready to quit at this time  - Advised that the [place] recommends annual screening for lung cancer with low dose CT scan in patients aged 48 to [de-identified] with a 20 pack year smoking history  Patient has a 24 pack year smoking history    - Low dose CT ordered moved from [place], needs PMD here moved from [place], needs PMD here  Pulmonology also please

## 2023-03-04 ENCOUNTER — APPOINTMENT (EMERGENCY)
Dept: CT IMAGING | Facility: HOSPITAL | Age: 68
End: 2023-03-04

## 2023-03-04 ENCOUNTER — HOSPITAL ENCOUNTER (EMERGENCY)
Facility: HOSPITAL | Age: 68
Discharge: HOME | End: 2023-03-04
Attending: EMERGENCY MEDICINE

## 2023-03-04 VITALS
SYSTOLIC BLOOD PRESSURE: 196 MMHG | TEMPERATURE: 98.4 F | OXYGEN SATURATION: 97 % | DIASTOLIC BLOOD PRESSURE: 91 MMHG | HEART RATE: 64 BPM | WEIGHT: 173.72 LBS | BODY MASS INDEX: 25.65 KG/M2 | RESPIRATION RATE: 18 BRPM

## 2023-03-04 DIAGNOSIS — T14.8XXA SUBCUTANEOUS HEMATOMA: ICD-10-CM
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
## 2023-03-04 LAB
ANION GAP SERPL CALCULATED.3IONS-SCNC: 5 MMOL/L (ref 4–13)
APTT PPP: 45 SECONDS (ref 23–37)
BASOPHILS # BLD AUTO: 0.02 THOUSANDS/ÂΜL (ref 0–0.1)
BASOPHILS NFR BLD AUTO: 0 % (ref 0–1)
BUN SERPL-MCNC: 19 MG/DL (ref 5–25)
CALCIUM SERPL-MCNC: 8.4 MG/DL (ref 8.4–10.2)
CHLORIDE SERPL-SCNC: 108 MMOL/L (ref 96–108)
CO2 SERPL-SCNC: 28 MMOL/L (ref 21–32)
CREAT SERPL-MCNC: 0.89 MG/DL (ref 0.6–1.3)
EOSINOPHIL # BLD AUTO: 0.33 THOUSAND/ÂΜL (ref 0–0.61)
EOSINOPHIL NFR BLD AUTO: 6 % (ref 0–6)
ERYTHROCYTE [DISTWIDTH] IN BLOOD BY AUTOMATED COUNT: 13.5 % (ref 11.6–15.1)
GFR SERPL CREATININE-BSD FRML MDRD: 68 ML/MIN/1.73SQ M
GLUCOSE SERPL-MCNC: 121 MG/DL (ref 65–140)
HCT VFR BLD AUTO: 31.6 % (ref 34.8–46.1)
HGB BLD-MCNC: 9.7 G/DL (ref 11.5–15.4)
IMM GRANULOCYTES # BLD AUTO: 0.02 THOUSAND/UL (ref 0–0.2)
IMM GRANULOCYTES NFR BLD AUTO: 0 % (ref 0–2)
INR PPP: 1.95 (ref 0.84–1.19)
LYMPHOCYTES # BLD AUTO: 1.13 THOUSANDS/ÂΜL (ref 0.6–4.47)
LYMPHOCYTES NFR BLD AUTO: 22 % (ref 14–44)
MCH RBC QN AUTO: 31.3 PG (ref 26.8–34.3)
MCHC RBC AUTO-ENTMCNC: 30.7 G/DL (ref 31.4–37.4)
MCV RBC AUTO: 102 FL (ref 82–98)
MONOCYTES # BLD AUTO: 0.54 THOUSAND/ÂΜL (ref 0.17–1.22)
MONOCYTES NFR BLD AUTO: 10 % (ref 4–12)
NEUTROPHILS # BLD AUTO: 3.15 THOUSANDS/ÂΜL (ref 1.85–7.62)
NEUTS SEG NFR BLD AUTO: 62 % (ref 43–75)
NRBC BLD AUTO-RTO: 0 /100 WBCS
PLATELET # BLD AUTO: 222 THOUSANDS/UL (ref 149–390)
PMV BLD AUTO: 10.7 FL (ref 8.9–12.7)
POTASSIUM SERPL-SCNC: 4.5 MMOL/L (ref 3.5–5.3)
PROTHROMBIN TIME: 22.2 SECONDS (ref 11.6–14.5)
RBC # BLD AUTO: 3.1 MILLION/UL (ref 3.81–5.12)
SODIUM SERPL-SCNC: 141 MMOL/L (ref 135–147)
WBC # BLD AUTO: 5.19 THOUSAND/UL (ref 4.31–10.16)

## 2023-03-04 RX ADMIN — IOHEXOL 100 ML: 350 INJECTION, SOLUTION INTRAVENOUS at 13:46

## 2023-03-04 RX ADMIN — MORPHINE SULFATE 2 MG: 2 INJECTION, SOLUTION INTRAMUSCULAR; INTRAVENOUS at 13:07

## 2023-03-04 NOTE — Clinical Note
Nawaf Curtis was seen and treated in our emergency department on 3/4/2023  Diagnosis:     Guerita Justa    She may return on this date: 03/08/2023         If you have any questions or concerns, please don't hesitate to call        Dara Burton PA-C    ______________________________           _______________          _______________  Hospital Representative                              Date                                Time

## 2023-03-05 NOTE — ED PROVIDER NOTES
History  Chief Complaint   Patient presents with   • Fall     Pt reports fell Thursday and top of her leg/hip has a baseball size lump on it  Denies head injury, denies LOC, +thinners     Berry Roy is a 75 yo F presenting for evaluation of L thigh pain, swelling after fall occurring two days ago while getting out of bed  [facility] about 2-3 feet onto floor onto left side  Reports struck left lateral thigh on the floor and has had swelling, pain to area since  It is slightly larger per pt than on initial day however has been for the most part stable in size  She is compliant with medication regimen including Xarelto  Denies other injuries or pain from this fall  She has been ambulatory without difficulty  Pain is 2/10 but does increase with direct pressure to area  History provided by:  Patient   used: No        Prior to Admission Medications   Prescriptions Last Dose Informant Patient Reported? Taking?    DULoxetine (Cymbalta) 30 mg delayed release capsule   No Yes   Sig: Take 1 capsule (30 mg total) by mouth daily   albuterol (ProAir HFA) 90 mcg/act inhaler   No Yes   Sig: Inhale 2 puffs every 6 (six) hours as needed for wheezing   atorvastatin (LIPITOR) 40 mg tablet   No Yes   Sig: Take 1 tablet (40 mg total) by mouth daily with dinner   furosemide (LASIX) 40 mg tablet   No Yes   Sig: Take 1 tablet (40 mg total) by mouth daily   hydrALAZINE (APRESOLINE) 100 MG tablet   No Yes   Sig: Take 1 tablet (100 mg total) by mouth 3 (three) times a day   hydrOXYzine HCL (ATARAX) 25 mg tablet   No Yes   Sig: Take 1 tablet (25 mg total) by mouth daily at bedtime   levothyroxine 137 mcg tablet   No Yes   Sig: Take 1 tablet (137 mcg total) by mouth every morning   rivaroxaban (Xarelto) 20 mg tablet   No Yes   Sig: Take 1 tablet (20 mg total) by mouth daily with breakfast   triamcinolone (KENALOG) 0 1 % ointment   No Yes   Sig: Apply topically 2 (two) times a day      Facility-Administered Medications: None Past Medical History:   Diagnosis Date   • [facility]    • Asthma    • Atopic dermatitis    • Chronic kidney disease     Stage 3   • Colon polyps    • Diabetes mellitus ([facility] )     type 2   • Disease of thyroid gland     hypothyroidism   • Diverticulosis    • Edema of both legs    • Gout    • Hemorrhoids    • Hypertension    • Nephrosclerosis    • Peripheral neuropathy    • Phlebitis    • Venous stasis dermatitis of both lower extremities    • Wrist joint pain     LEFT       Past Surgical History:   Procedure Laterality Date   • CERVIX SURGERY     • COLONOSCOPY N/A 5/31/2016    Procedure: COLONOSCOPY;  Surgeon: Jason Pacheco MD;  Location: [facility]; Service:    • VEIN SURGERY         Family History   Problem Relation Age of Onset   • Diabetes Mother         Type 2 as per allscripts   • Heart attack Father    • Diabetes type II Sister    • Hypertension Other      I have reviewed and agree with the history as documented  E-Cigarette/Vaping   • E-Cigarette Use Never User      E-Cigarette/Vaping Substances   • Nicotine No    • THC No    • CBD No    • Flavoring No    • Other No    • Unknown No      Social History     Tobacco Use   • Smoking status: Every Day     Packs/day: 0 25     Years: 15 00     Pack years: 3 75     Types: Cigarettes     Start date: 1977   • Smokeless tobacco: Never   • Tobacco comments:     5 cigarettes a day   Vaping Use   • Vaping Use: Never used   Substance Use Topics   • Alcohol use: Yes     Comment: social   • Drug use: No       Review of Systems   Constitutional: Negative for chills and fever  HENT: Negative for congestion, rhinorrhea and sore throat  Eyes: Negative for pain and visual disturbance  Respiratory: Negative for cough, shortness of breath and wheezing  Cardiovascular: Negative for chest pain and palpitations  Gastrointestinal: Negative for abdominal pain, nausea and vomiting  Genitourinary: Negative for dysuria, frequency and urgency  Musculoskeletal: Negative for back pain, neck pain and neck stiffness  +L leg pain   Skin: Negative for rash and wound  Neurological: Negative for dizziness, weakness, light-headedness and numbness  Physical Exam  Physical Exam  Constitutional:       General: She is not in acute distress  Appearance: She is well-developed  She is not diaphoretic  HENT:      Head: Normocephalic and atraumatic  Right Ear: External ear normal       Left Ear: External ear normal    Eyes:      Conjunctiva/sclera: Conjunctivae normal       Pupils: Pupils are equal, round, and reactive to light  Cardiovascular:      Rate and Rhythm: Normal rate and regular rhythm  Heart sounds: Normal heart sounds  No murmur heard  No friction rub  No gallop  Pulmonary:      Effort: Pulmonary effort is normal  No respiratory distress  Breath sounds: Normal breath sounds  No wheezing  Abdominal:      General: There is no distension  Palpations: Abdomen is soft  Tenderness: There is no abdominal tenderness  Musculoskeletal:      Cervical back: Normal range of motion and neck supple  Comments: Soft tissue swelling over lateral L thigh, firm palpable area of swelling with surrounding hematoma noted  Normal ROM L hip without significant pain or decreased ROM  Normal ROM L knee without bony TTP  2+ dorsalis pedis, posterior tibial pulse to LLE  Brisk cap refill, intact sensation   Lymphadenopathy:      Cervical: No cervical adenopathy  Skin:     General: Skin is warm and dry  Capillary Refill: Capillary refill takes less than 2 seconds  Findings: No erythema or rash  Neurological:      Mental Status: She is alert and oriented to person, place, and time  Motor: No abnormal muscle tone  Coordination: Coordination normal    Psychiatric:         Behavior: Behavior normal          Thought Content:  Thought content normal          Judgment: Judgment normal          Vital Signs  ED Triage Vitals   Temperature Pulse Respirations Blood Pressure SpO2   03/04/23 1133 03/04/23 1133 03/04/23 1133 03/04/23 1134 03/04/23 1133   98 4 °F (36 9 °C) 73 18 (!) 179/80 98 %      Temp src Heart Rate Source Patient Position - Orthostatic VS BP Location FiO2 (%)   -- 03/04/23 1133 03/04/23 1310 03/04/23 1310 --    Monitor Lying Left arm       Pain Score       03/04/23 1307       8           Vitals:    03/04/23 1133 03/04/23 1134 03/04/23 1310   BP:  (!) 179/80 (!) 196/91   Pulse: 73  64   Patient Position - Orthostatic VS:   Lying         Visual Acuity  Visual Acuity    Flowsheet Row Most Recent Value   L Pupil Size (mm) 3   R Pupil Size (mm) 3          ED Medications  Medications   morphine injection 2 mg (2 mg Intravenous Given 3/4/23 1307)   iohexol (OMNIPAQUE) 350 MG/ML injection (SINGLE-DOSE) 100 mL (100 mL Intravenous Given 3/4/23 1346)       Diagnostic Studies  Results Reviewed     Procedure Component Value Units Date/Time    APTT [[id number]]  (Abnormal) Collected: 03/04/23 1230    Lab Status: Final result Specimen: Blood from Arm, Left Updated: 03/04/23 1331     PTT 45 seconds     Protime-INR [[id number]]  (Abnormal) Collected: 03/04/23 1230    Lab Status: Final result Specimen: Blood from Arm, Left Updated: 03/04/23 1331     Protime 22 2 seconds      INR 9 66    Basic metabolic panel [[id number]] Collected: 03/04/23 1230    Lab Status: Final result Specimen: Blood from Arm, Left Updated: 03/04/23 1323     Sodium 141 mmol/L      Potassium 4 5 mmol/L      Chloride 108 mmol/L      CO2 28 mmol/L      ANION GAP 5 mmol/L      BUN 19 mg/dL      Creatinine 0 89 mg/dL      Glucose 121 mg/dL      Calcium 8 4 mg/dL      eGFR 68 ml/min/1 73sq m     Narrative:      [place] guidelines for Chronic Kidney Disease (CKD):   •  Stage 1 with normal or high GFR (GFR > 90 mL/min/1 73 square meters)  •  Stage 2 Mild CKD (GFR = 60-89 mL/min/1 73 square meters)  •  Stage 3A Moderate CKD (GFR = 45-59 mL/min/1 73 square meters)  •  Stage 3B Moderate CKD (GFR = 30-44 mL/min/1 73 square meters)  •  Stage 4 Severe CKD (GFR = 15-29 mL/min/1 73 square meters)  •  Stage 5 End Stage CKD (GFR <15 mL/min/1 73 square meters)  Note: GFR calculation is accurate only with a steady state creatinine    CBC and differential [[id number]]  (Abnormal) Collected: 03/04/23 1230    Lab Status: Final result Specimen: Blood from Arm, Left Updated: 03/04/23 1303     WBC 5 19 Thousand/uL      RBC 3 10 Million/uL      Hemoglobin 9 7 g/dL      Hematocrit 31 6 %       fL      MCH 31 3 pg      MCHC 30 7 g/dL      RDW 13 5 %      MPV 10 7 fL      Platelets 762 Thousands/uL      nRBC 0 /100 WBCs      Neutrophils Relative 62 %      Immat GRANS % 0 %      Lymphocytes Relative 22 %      Monocytes Relative 10 %      Eosinophils Relative 6 %      Basophils Relative 0 %      Neutrophils Absolute 3 15 Thousands/µL      Immature Grans Absolute 0 02 Thousand/uL      Lymphocytes Absolute 1 13 Thousands/µL      Monocytes Absolute 0 54 Thousand/µL      Eosinophils Absolute 0 33 Thousand/µL      Basophils Absolute 0 02 Thousands/µL                  CT lower extremity w contrast left   Final Result by Xenia Rae MD (03/04 1423)      Superficial hematoma centered within the subcutaneous fat of left upper thigh adjacent to the greater trochanter, measuring 9 3 x 7 6 x 5 2 cm  No acute osseous abnormality  Workstation performed: [id number]                    Procedures  Procedures         ED Course                               SBIRT 20yo+    Flowsheet Row Most Recent Value   SBIRT (23 yo +)    In order to provide better care to our patients, we are screening all of our patients for alcohol and drug use  Would it be okay to ask you these screening questions? No Filed at: 03/04/2023 1136                    Medical Decision Making  Swelling, pain over L lateral thigh over the past two days after a fall   She is noted to have firm, tender area of swelling to lateral thigh with overlying/surrounding hematoma  Intact neurovascular distally  Given Xarelto and concern for hematoma/active extrav CT with IV contrast was obtained of R thigh revealing 9 3x7 6x5 2 cm hematoma adjacent to greater trochanter  No active extrav noted, limited exam per report as not arterial phase imaging  Size of hematoma appears stable throughout time in ED however  Upper thigh circumference measured just inferior to groin/buttock measured to 26 5 inches at this time over area of hematoma  ACE wrap applied over area to provide compression against further expansion, tolerated well  Additional wraps provided for at home  Pt advised to hold Xarelto x2 days before resuming as normal, f/u with PCP  Verbalizes understanding as well as of indications to return including worsening pain/swelling, inability to walk  Amount and/or Complexity of Data Reviewed  Labs: ordered  Decision-making details documented in ED Course  Radiology: ordered and independent interpretation performed  Risk  Prescription drug management  Disposition  Final diagnoses:   Fall, initial encounter   Subcutaneous hematoma     Time reflects when diagnosis was documented in both MDM as applicable and the Disposition within this note     Time User Action Codes Description Comment    3/4/2023  3:34 PM Savanna Chowdary Add Isaias Rothman  QAQK] Fall, initial encounter     3/4/2023  3:34 PM Lara Gallegos  8XXA] Subcutaneous hematoma       ED Disposition     ED Disposition   Discharge    Condition   Stable    Date/Time   Sat Mar 4, 2023  3:34 PM    Comment   Albert 108 discharge to home/self care                 Follow-up Information     Follow up With Specialties Details Why Contact Info Additional [address] [facility] Emergency Department Emergency Medicine  If symptoms worsen [place] [id number]  [facility] Emergency Department, [address] [facility] , [place], [place], 93 Prosper Saxena MD [facility] Medicine Call   Isaac Carranza [facility]  [address] [place] [address]  [phone]             Discharge Medication List as of 3/4/2023  3:36 PM      CONTINUE these medications which have NOT CHANGED    Details   albuterol (ProAir HFA) 90 mcg/act inhaler Inhale 2 puffs every 6 (six) hours as needed for wheezing, Starting Fri 3/3/2023, Normal      atorvastatin (LIPITOR) 40 mg tablet Take 1 tablet (40 mg total) by mouth daily with dinner, Starting Fri 2/10/2023, Normal      DULoxetine (Cymbalta) 30 mg delayed release capsule Take 1 capsule (30 mg total) by mouth daily, Starting Fri 2/10/2023, Normal      furosemide (LASIX) 40 mg tablet Take 1 tablet (40 mg total) by mouth daily, Starting Fri 2/10/2023, Normal      hydrALAZINE (APRESOLINE) 100 MG tablet Take 1 tablet (100 mg total) by mouth 3 (three) times a day, Starting Wed 11/30/2022, Normal      hydrOXYzine HCL (ATARAX) 25 mg tablet Take 1 tablet (25 mg total) by mouth daily at bedtime, Starting Wed 11/30/2022, Normal      levothyroxine 137 mcg tablet Take 1 tablet (137 mcg total) by mouth every morning, Starting Fri 2/10/2023, Normal      rivaroxaban (Xarelto) 20 mg tablet Take 1 tablet (20 mg total) by mouth daily with breakfast, Starting Fri 2/10/2023, Normal      triamcinolone (KENALOG) 0 1 % ointment Apply topically 2 (two) times a day, Starting Fri 2/10/2023, Normal             No discharge procedures on file      PDMP Review       Value Time User    PDMP Reviewed  Yes 10/30/2020 11:27 AM Arden Ellis DO          ED Provider  Electronically Signed by           Antonina Jerry PA-C  03/05/23 1790

## 2023-03-06 ENCOUNTER — VBI (OUTPATIENT)
Dept: FAMILY MEDICINE CLINIC | Facility: CLINIC | Age: 68
End: 2023-03-06

## 2023-03-06 NOTE — TELEPHONE ENCOUNTER
[facility]    ED Visit Information     Ed visit date: 3/4/2023  Diagnosis Description: Fall  In Network? Yes [facility] - CLOSED  Discharge status: Home  Discharged with meds ? No  Number of ED visits to date: 1  ED Severity:3     Outreach Information    Outreach successful: No 3  Date letter mailed:my chart  Date Finalized:3/8/2023    Care Coordination    Follow up appointment with pcp: no n/a  Transportation issues ?  NA    Value [facility] type: 7 Day Outreach  03/06/2023 11:39 AM EST by Padma Marquis MA 03/06/2023 11:39 AM EST by CATRACHO Diaz (Self) [phone] (QTDY)[phone] (Home)  [phone] (Home) Remove  - Left Message        03/07/2023 10:04 AM EST by Padma Marquis MA 03/07/2023 10:04 AM EST by CATRACHO Diaz (Self) [phone] (DXUQ)[phone] (Home)  [phone] (Home) Remove  - Left Message

## 2023-03-06 NOTE — LETTER
[facility]  [address]  [address] [facility]  [phone]    Date: 03/08/23  Fabiana Orr  [address] [place] [id number]    Dear Bekah Oliva: Thank you for choosing [facility] emergency department for care  As your primary care provider we want to make sure that your ongoing medical care is being addressed  If you require follow up care as a result of your emergency department visit, there are a few things we would like you to know  As part of our continuing commitment to caring for our patient, we have added more same day appointments and have extended our office hours to meet your medical needs  After hours, on-call physicians are available via our main office line  We encourage you to contact our office prior to seeking treatment to discuss your symptoms with our medical staff  Together, we can determine the correct course of action  A majority of non-emergent conditions such as: common cold, flu-like symptoms, fevers, strains/sprains, dislocations, minor burns, cuts and animal bites can be treated at [facility] facilities  Diagnostic testing is available at some sites  Of course, if you are experiencing a life threatening medical emergency call 911 or proceed directly to the nearest emergency room      Your nearest [facility] facility is conveniently located at:    [facility] offered at most [facility] locations  [employer] your spot online at [email or web address] or on the [facility]    Sincerely,    [facility]  Dept: [phone]

## 2023-05-08 PROBLEM — F10.920 ALCOHOLIC INTOXICATION WITHOUT COMPLICATION (HCC): Status: ACTIVE | Noted: 2023-05-08

## 2023-05-08 PROBLEM — F33.9 DEPRESSION, RECURRENT (HCC): Status: ACTIVE | Noted: 2023-05-08

## 2023-05-11 PROBLEM — Z09 HOSPITAL DISCHARGE FOLLOW-UP: Status: ACTIVE | Noted: 2023-05-11

## 2023-05-11 PROBLEM — M79.642 PAIN IN BOTH HANDS: Status: RESOLVED | Noted: 2022-12-01 | Resolved: 2023-05-11

## 2023-05-11 PROBLEM — Z09 HOSPITAL DISCHARGE FOLLOW-UP: Status: RESOLVED | Noted: 2023-05-11 | Resolved: 2023-05-11

## 2023-05-11 PROBLEM — M79.641 PAIN IN BOTH HANDS: Status: RESOLVED | Noted: 2022-12-01 | Resolved: 2023-05-11

## 2023-05-19 DIAGNOSIS — J45.20 MILD INTERMITTENT ASTHMA WITHOUT COMPLICATION: ICD-10-CM

## 2023-05-19 DIAGNOSIS — S82.841S CLOSED BIMALLEOLAR FRACTURE OF RIGHT ANKLE, SEQUELA: ICD-10-CM

## 2023-05-19 RX ORDER — OXYCODONE HYDROCHLORIDE 5 MG/1
5 TABLET ORAL EVERY 6 HOURS PRN
Qty: 30 TABLET | Refills: 0 | OUTPATIENT
Start: 2023-05-19

## 2023-05-19 RX ORDER — ALBUTEROL SULFATE 90 UG/1
2 AEROSOL, METERED RESPIRATORY (INHALATION) EVERY 6 HOURS PRN
Qty: 8.5 G | Refills: 0 | Status: SHIPPED | OUTPATIENT
Start: 2023-05-19 | End: 2023-06-02 | Stop reason: SDUPTHER

## 2023-05-19 NOTE — TELEPHONE ENCOUNTER
The oxycodone that I refilled for patient was only a temporary supply  If she is still having a lot of post operative pain she should talk to the Orthopedic surgeon   Thank you

## 2023-06-02 DIAGNOSIS — G60.9 IDIOPATHIC PERIPHERAL NEUROPATHY: ICD-10-CM

## 2023-06-02 DIAGNOSIS — I82.462 ACUTE DEEP VEIN THROMBOSIS (DVT) OF CALF MUSCLE VEIN OF LEFT LOWER EXTREMITY (HCC): ICD-10-CM

## 2023-06-02 DIAGNOSIS — I10 ESSENTIAL HYPERTENSION: ICD-10-CM

## 2023-06-02 DIAGNOSIS — J45.20 MILD INTERMITTENT ASTHMA WITHOUT COMPLICATION: ICD-10-CM

## 2023-06-02 DIAGNOSIS — F51.05 INSOMNIA DUE TO MENTAL CONDITION: ICD-10-CM

## 2023-06-02 DIAGNOSIS — E78.2 MIXED HYPERLIPIDEMIA: ICD-10-CM

## 2023-06-02 RX ORDER — HYDROXYZINE HYDROCHLORIDE 25 MG/1
25 TABLET, FILM COATED ORAL
Qty: 90 TABLET | Refills: 0 | Status: SHIPPED | OUTPATIENT
Start: 2023-06-02

## 2023-06-02 RX ORDER — ALBUTEROL SULFATE 90 UG/1
2 AEROSOL, METERED RESPIRATORY (INHALATION) EVERY 6 HOURS PRN
Qty: 8.5 G | Refills: 0 | Status: SHIPPED | OUTPATIENT
Start: 2023-06-02

## 2023-06-02 RX ORDER — AMLODIPINE BESYLATE 2.5 MG/1
2.5 TABLET ORAL DAILY
Qty: 90 TABLET | Refills: 0 | Status: SHIPPED | OUTPATIENT
Start: 2023-06-02

## 2023-06-02 RX ORDER — ATORVASTATIN CALCIUM 40 MG/1
40 TABLET, FILM COATED ORAL
Qty: 90 TABLET | Refills: 0 | Status: SHIPPED | OUTPATIENT
Start: 2023-06-02

## 2023-06-02 RX ORDER — FUROSEMIDE 40 MG/1
40 TABLET ORAL DAILY
Qty: 90 TABLET | Refills: 0 | Status: SHIPPED | OUTPATIENT
Start: 2023-06-02

## 2023-06-02 RX ORDER — DULOXETIN HYDROCHLORIDE 30 MG/1
30 CAPSULE, DELAYED RELEASE ORAL DAILY
Qty: 90 CAPSULE | Refills: 0 | Status: SHIPPED | OUTPATIENT
Start: 2023-06-02

## 2023-07-27 ENCOUNTER — APPOINTMENT (OUTPATIENT)
Dept: LAB | Facility: MEDICAL CENTER | Age: 68
End: 2023-07-27
Payer: COMMERCIAL

## 2023-07-27 DIAGNOSIS — N28.9 RENAL INSUFFICIENCY: ICD-10-CM

## 2023-07-27 DIAGNOSIS — E78.2 MIXED HYPERLIPIDEMIA: ICD-10-CM

## 2023-07-27 DIAGNOSIS — Z86.73 HISTORY OF CVA (CEREBROVASCULAR ACCIDENT): ICD-10-CM

## 2023-07-27 DIAGNOSIS — E03.9 ACQUIRED HYPOTHYROIDISM: ICD-10-CM

## 2023-07-27 DIAGNOSIS — L30.0 NUMMULAR ECZEMA: ICD-10-CM

## 2023-07-27 DIAGNOSIS — E55.9 VITAMIN D DEFICIENCY: ICD-10-CM

## 2023-07-27 DIAGNOSIS — R74.01 TRANSAMINITIS: ICD-10-CM

## 2023-07-27 DIAGNOSIS — Z13.6 SCREENING FOR CARDIOVASCULAR CONDITION: ICD-10-CM

## 2023-07-27 DIAGNOSIS — Z00.00 MEDICARE ANNUAL WELLNESS VISIT, SUBSEQUENT: ICD-10-CM

## 2023-07-27 LAB
25(OH)D3 SERPL-MCNC: 33.2 NG/ML (ref 30–100)
BASOPHILS # BLD AUTO: 0.05 THOUSANDS/ÂΜL (ref 0–0.1)
BASOPHILS NFR BLD AUTO: 1 % (ref 0–1)
BUN SERPL-MCNC: 27 MG/DL (ref 5–25)
CHOLEST SERPL-MCNC: 129 MG/DL
CREAT SERPL-MCNC: 1.2 MG/DL (ref 0.6–1.3)
EOSINOPHIL # BLD AUTO: 0.52 THOUSAND/ÂΜL (ref 0–0.61)
EOSINOPHIL NFR BLD AUTO: 10 % (ref 0–6)
ERYTHROCYTE [DISTWIDTH] IN BLOOD BY AUTOMATED COUNT: 15.2 % (ref 11.6–15.1)
GFR SERPL CREATININE-BSD FRML MDRD: 47 ML/MIN/1.73SQ M
HCT VFR BLD AUTO: 35.8 % (ref 34.8–46.1)
HDLC SERPL-MCNC: 73 MG/DL
HGB BLD-MCNC: 11 G/DL (ref 11.5–15.4)
IMM GRANULOCYTES # BLD AUTO: 0.03 THOUSAND/UL (ref 0–0.2)
IMM GRANULOCYTES NFR BLD AUTO: 1 % (ref 0–2)
LDLC SERPL CALC-MCNC: 34 MG/DL (ref 0–100)
LYMPHOCYTES # BLD AUTO: 1.53 THOUSANDS/ÂΜL (ref 0.6–4.47)
LYMPHOCYTES NFR BLD AUTO: 29 % (ref 14–44)
MCH RBC QN AUTO: 29.8 PG (ref 26.8–34.3)
MCHC RBC AUTO-ENTMCNC: 30.7 G/DL (ref 31.4–37.4)
MCV RBC AUTO: 97 FL (ref 82–98)
MONOCYTES # BLD AUTO: 0.53 THOUSAND/ÂΜL (ref 0.17–1.22)
MONOCYTES NFR BLD AUTO: 10 % (ref 4–12)
NEUTROPHILS # BLD AUTO: 2.69 THOUSANDS/ÂΜL (ref 1.85–7.62)
NEUTS SEG NFR BLD AUTO: 49 % (ref 43–75)
NONHDLC SERPL-MCNC: 56 MG/DL
NRBC BLD AUTO-RTO: 0 /100 WBCS
PLATELET # BLD AUTO: 260 THOUSANDS/UL (ref 149–390)
PMV BLD AUTO: 11.4 FL (ref 8.9–12.7)
RBC # BLD AUTO: 3.69 MILLION/UL (ref 3.81–5.12)
T4 FREE SERPL-MCNC: 0.56 NG/DL (ref 0.61–1.12)
TRIGL SERPL-MCNC: 112 MG/DL
TSH SERPL DL<=0.05 MIU/L-ACNC: 12.52 UIU/ML (ref 0.45–4.5)
WBC # BLD AUTO: 5.35 THOUSAND/UL (ref 4.31–10.16)

## 2023-07-27 PROCEDURE — 82390 ASSAY OF CERULOPLASMIN: CPT

## 2023-07-27 PROCEDURE — 36415 COLL VENOUS BLD VENIPUNCTURE: CPT

## 2023-07-27 PROCEDURE — 84443 ASSAY THYROID STIM HORMONE: CPT

## 2023-07-27 PROCEDURE — 84439 ASSAY OF FREE THYROXINE: CPT

## 2023-07-27 PROCEDURE — 85025 COMPLETE CBC W/AUTO DIFF WBC: CPT

## 2023-07-27 PROCEDURE — 82306 VITAMIN D 25 HYDROXY: CPT

## 2023-07-27 PROCEDURE — 80061 LIPID PANEL: CPT

## 2023-07-27 PROCEDURE — 84520 ASSAY OF UREA NITROGEN: CPT

## 2023-07-27 PROCEDURE — 82565 ASSAY OF CREATININE: CPT

## 2023-07-28 LAB — CERULOPLASMIN SERPL-MCNC: 28.8 MG/DL (ref 19–39)

## 2023-08-01 DIAGNOSIS — I10 ESSENTIAL HYPERTENSION: ICD-10-CM

## 2023-08-01 DIAGNOSIS — E03.9 ACQUIRED HYPOTHYROIDISM: Primary | ICD-10-CM

## 2023-08-01 DIAGNOSIS — E03.9 ACQUIRED HYPOTHYROIDISM: ICD-10-CM

## 2023-08-01 DIAGNOSIS — F51.05 INSOMNIA DUE TO MENTAL CONDITION: ICD-10-CM

## 2023-08-01 RX ORDER — HYDROXYZINE HYDROCHLORIDE 25 MG/1
25 TABLET, FILM COATED ORAL
Qty: 90 TABLET | Refills: 1 | Status: SHIPPED | OUTPATIENT
Start: 2023-08-01

## 2023-08-01 RX ORDER — AMLODIPINE BESYLATE 2.5 MG/1
2.5 TABLET ORAL DAILY
Qty: 90 TABLET | Refills: 1 | Status: SHIPPED | OUTPATIENT
Start: 2023-08-01

## 2023-08-01 RX ORDER — HYDRALAZINE HYDROCHLORIDE 100 MG/1
100 TABLET, FILM COATED ORAL 3 TIMES DAILY
Qty: 270 TABLET | Refills: 1 | Status: SHIPPED | OUTPATIENT
Start: 2023-08-01

## 2023-08-01 RX ORDER — LEVOTHYROXINE SODIUM 137 UG/1
137 TABLET ORAL EVERY MORNING
Qty: 90 TABLET | Refills: 0 | Status: SHIPPED | OUTPATIENT
Start: 2023-08-01

## 2023-08-11 ENCOUNTER — VBI (OUTPATIENT)
Dept: ADMINISTRATIVE | Facility: OTHER | Age: 68
End: 2023-08-11

## 2023-09-13 DIAGNOSIS — I82.462 ACUTE DEEP VEIN THROMBOSIS (DVT) OF CALF MUSCLE VEIN OF LEFT LOWER EXTREMITY (HCC): ICD-10-CM

## 2023-09-13 DIAGNOSIS — E78.2 MIXED HYPERLIPIDEMIA: ICD-10-CM

## 2023-09-13 DIAGNOSIS — J45.20 MILD INTERMITTENT ASTHMA WITHOUT COMPLICATION: ICD-10-CM

## 2023-09-13 DIAGNOSIS — E03.9 ACQUIRED HYPOTHYROIDISM: ICD-10-CM

## 2023-09-13 DIAGNOSIS — G60.9 IDIOPATHIC PERIPHERAL NEUROPATHY: ICD-10-CM

## 2023-09-13 DIAGNOSIS — I10 ESSENTIAL HYPERTENSION: ICD-10-CM

## 2023-09-13 DIAGNOSIS — F51.05 INSOMNIA DUE TO MENTAL CONDITION: ICD-10-CM

## 2023-09-13 RX ORDER — ATORVASTATIN CALCIUM 40 MG/1
40 TABLET, FILM COATED ORAL
Qty: 90 TABLET | Refills: 0 | Status: SHIPPED | OUTPATIENT
Start: 2023-09-13

## 2023-09-13 RX ORDER — ALBUTEROL SULFATE 90 UG/1
2 AEROSOL, METERED RESPIRATORY (INHALATION) EVERY 6 HOURS PRN
Qty: 8.5 G | Refills: 0 | Status: SHIPPED | OUTPATIENT
Start: 2023-09-13

## 2023-09-13 RX ORDER — HYDROXYZINE HYDROCHLORIDE 25 MG/1
25 TABLET, FILM COATED ORAL
Qty: 90 TABLET | Refills: 0 | Status: SHIPPED | OUTPATIENT
Start: 2023-09-13

## 2023-09-13 RX ORDER — DULOXETIN HYDROCHLORIDE 30 MG/1
30 CAPSULE, DELAYED RELEASE ORAL DAILY
Qty: 90 CAPSULE | Refills: 0 | Status: SHIPPED | OUTPATIENT
Start: 2023-09-13

## 2023-09-13 RX ORDER — FUROSEMIDE 40 MG/1
40 TABLET ORAL DAILY
Qty: 90 TABLET | Refills: 0 | Status: SHIPPED | OUTPATIENT
Start: 2023-09-13

## 2023-09-13 RX ORDER — LEVOTHYROXINE SODIUM 137 UG/1
137 TABLET ORAL EVERY MORNING
Qty: 90 TABLET | Refills: 0 | Status: SHIPPED | OUTPATIENT
Start: 2023-09-13

## 2023-10-25 ENCOUNTER — OFFICE VISIT (OUTPATIENT)
Dept: FAMILY MEDICINE CLINIC | Facility: CLINIC | Age: 68
End: 2023-10-25
Payer: COMMERCIAL

## 2023-10-25 VITALS
TEMPERATURE: 97.4 F | DIASTOLIC BLOOD PRESSURE: 88 MMHG | SYSTOLIC BLOOD PRESSURE: 129 MMHG | OXYGEN SATURATION: 97 % | BODY MASS INDEX: 24.59 KG/M2 | HEIGHT: 69 IN | HEART RATE: 100 BPM | WEIGHT: 166 LBS

## 2023-10-25 DIAGNOSIS — R05.1 ACUTE COUGH: Primary | ICD-10-CM

## 2023-10-25 DIAGNOSIS — J45.20 MILD INTERMITTENT ASTHMA WITHOUT COMPLICATION: ICD-10-CM

## 2023-10-25 DIAGNOSIS — I10 ESSENTIAL HYPERTENSION: ICD-10-CM

## 2023-10-25 DIAGNOSIS — F51.05 INSOMNIA DUE TO MENTAL CONDITION: ICD-10-CM

## 2023-10-25 DIAGNOSIS — E78.2 MIXED HYPERLIPIDEMIA: ICD-10-CM

## 2023-10-25 PROCEDURE — 99214 OFFICE O/P EST MOD 30 MIN: CPT | Performed by: FAMILY MEDICINE

## 2023-10-25 RX ORDER — GUAIFENESIN AND CODEINE PHOSPHATE 100; 10 MG/5ML; MG/5ML
5 SOLUTION ORAL 3 TIMES DAILY PRN
Qty: 118 ML | Refills: 0 | Status: SHIPPED | OUTPATIENT
Start: 2023-10-25

## 2023-10-25 RX ORDER — AMLODIPINE BESYLATE 2.5 MG/1
2.5 TABLET ORAL DAILY
Qty: 90 TABLET | Refills: 1 | Status: SHIPPED | OUTPATIENT
Start: 2023-10-25

## 2023-10-25 RX ORDER — HYDROXYZINE HYDROCHLORIDE 25 MG/1
25 TABLET, FILM COATED ORAL
Qty: 90 TABLET | Refills: 0 | Status: SHIPPED | OUTPATIENT
Start: 2023-10-25

## 2023-10-25 RX ORDER — PREDNISONE 20 MG/1
40 TABLET ORAL DAILY
Qty: 10 TABLET | Refills: 0 | Status: SHIPPED | OUTPATIENT
Start: 2023-10-25 | End: 2023-10-30

## 2023-10-25 RX ORDER — ALBUTEROL SULFATE 90 UG/1
2 AEROSOL, METERED RESPIRATORY (INHALATION) EVERY 6 HOURS PRN
Qty: 8.5 G | Refills: 0 | Status: SHIPPED | OUTPATIENT
Start: 2023-10-25

## 2023-10-25 RX ORDER — BENZONATATE 200 MG/1
200 CAPSULE ORAL 3 TIMES DAILY PRN
Qty: 30 CAPSULE | Refills: 0 | Status: SHIPPED | OUTPATIENT
Start: 2023-10-25

## 2023-10-25 NOTE — PROGRESS NOTES
Assessment/Plan:    No problem-specific Assessment & Plan notes found for this encounter. Diagnoses and all orders for this visit:    Acute cough  -     guaifenesin-codeine (GUAIFENESIN AC) 100-10 MG/5ML liquid; Take 5 mL by mouth 3 (three) times a day as needed for cough  -     benzonatate (TESSALON) 200 MG capsule; Take 1 capsule (200 mg total) by mouth 3 (three) times a day as needed for cough    Essential hypertension  -     amLODIPine (NORVASC) 2.5 mg tablet; Take 1 tablet (2.5 mg total) by mouth daily  -     Comprehensive metabolic panel; Future  -     CBC and differential; Future    Insomnia due to mental condition  -     hydrOXYzine HCL (ATARAX) 25 mg tablet; Take 1 tablet (25 mg total) by mouth daily at bedtime    Mild intermittent asthma without complication  -     predniSONE 20 mg tablet; Take 2 tablets (40 mg total) by mouth daily for 5 days    Mixed hyperlipidemia  -     Lipid Panel with Direct LDL reflex; Future        - Start 5 day course of prednisone for acute asthma exacerbation secondary to viral illness. Patient may also start guaifenesin-codeine and tessalon perles for cough (PDMP reviewed)  - Follow up in one month for follow up of chronic conditions. Subjective:      Patient ID: Keri Graf is a 74 y.o. female. Cough  This is a new problem. The current episode started in the past 7 days. The problem has been unchanged. The cough is Productive of sputum. Associated symptoms include rhinorrhea. Pertinent negatives include no chills, fever, hemoptysis or shortness of breath. Nothing aggravates the symptoms. She has tried OTC cough suppressant for the symptoms. The treatment provided no relief. Her past medical history is significant for asthma. History is positive for sick contacts at home. Patient states that she has been using her albuterol inhaler frequently. She is also requesting a refill of some of her medications.      The following portions of the patient's history were reviewed and updated as appropriate: allergies, current medications, past family history, past medical history, past social history, past surgical history, and problem list.    Review of Systems   Constitutional:  Negative for chills and fever. HENT:  Positive for rhinorrhea. Respiratory:  Positive for cough and chest tightness. Negative for hemoptysis and shortness of breath. Gastrointestinal: Negative. Genitourinary: Negative. Musculoskeletal: Negative. Objective:      /88 (BP Location: Left arm, Patient Position: Sitting, Cuff Size: Adult)   Pulse 100   Temp (!) 97.4 °F (36.3 °C) (Temporal)   Wt 75.3 kg (166 lb)   SpO2 97%   BMI 24.51 kg/m²          Physical Exam  Constitutional:       General: She is not in acute distress. Appearance: She is not ill-appearing. HENT:      Head: Normocephalic and atraumatic. Eyes:      General:         Right eye: No discharge. Left eye: No discharge. Extraocular Movements: Extraocular movements intact. Cardiovascular:      Rate and Rhythm: Normal rate. Heart sounds: Murmur heard. Pulmonary:      Effort: Pulmonary effort is normal. No respiratory distress. Breath sounds: Wheezing (Mild expiratory wheezes in upper lung fields) present. Neurological:      General: No focal deficit present. Mental Status: She is alert.    Psychiatric:         Mood and Affect: Mood normal.         Behavior: Behavior normal.

## 2023-12-05 DIAGNOSIS — I10 ESSENTIAL HYPERTENSION: ICD-10-CM

## 2023-12-05 RX ORDER — HYDRALAZINE HYDROCHLORIDE 100 MG/1
100 TABLET, FILM COATED ORAL 3 TIMES DAILY
Qty: 270 TABLET | Refills: 1 | Status: SHIPPED | OUTPATIENT
Start: 2023-12-05

## 2024-01-30 ENCOUNTER — VBI (OUTPATIENT)
Dept: ADMINISTRATIVE | Facility: OTHER | Age: 69
End: 2024-01-30

## 2024-03-08 ENCOUNTER — OFFICE VISIT (OUTPATIENT)
Dept: FAMILY MEDICINE CLINIC | Facility: CLINIC | Age: 69
End: 2024-03-08
Payer: COMMERCIAL

## 2024-03-08 VITALS
DIASTOLIC BLOOD PRESSURE: 110 MMHG | SYSTOLIC BLOOD PRESSURE: 190 MMHG | RESPIRATION RATE: 16 BRPM | WEIGHT: 170.8 LBS | HEART RATE: 90 BPM | OXYGEN SATURATION: 97 % | BODY MASS INDEX: 25.22 KG/M2 | TEMPERATURE: 97.8 F

## 2024-03-08 DIAGNOSIS — F51.05 INSOMNIA DUE TO MENTAL CONDITION: ICD-10-CM

## 2024-03-08 DIAGNOSIS — E03.8 TSH (THYROID-STIMULATING HORMONE DEFICIENCY): ICD-10-CM

## 2024-03-08 DIAGNOSIS — I82.409 RECURRENT DEEP VEIN THROMBOSIS (DVT) (HCC): ICD-10-CM

## 2024-03-08 DIAGNOSIS — E78.2 MIXED HYPERLIPIDEMIA: ICD-10-CM

## 2024-03-08 DIAGNOSIS — I82.462 ACUTE DEEP VEIN THROMBOSIS (DVT) OF CALF MUSCLE VEIN OF LEFT LOWER EXTREMITY (HCC): ICD-10-CM

## 2024-03-08 DIAGNOSIS — N18.32 STAGE 3B CHRONIC KIDNEY DISEASE (HCC): Primary | ICD-10-CM

## 2024-03-08 DIAGNOSIS — F33.9 DEPRESSION, RECURRENT (HCC): ICD-10-CM

## 2024-03-08 DIAGNOSIS — E03.9 ACQUIRED HYPOTHYROIDISM: ICD-10-CM

## 2024-03-08 DIAGNOSIS — I10 ESSENTIAL HYPERTENSION: ICD-10-CM

## 2024-03-08 DIAGNOSIS — G60.9 IDIOPATHIC PERIPHERAL NEUROPATHY: ICD-10-CM

## 2024-03-08 PROBLEM — F14.10 COCAINE ABUSE (HCC): Status: ACTIVE | Noted: 2024-03-08

## 2024-03-08 PROBLEM — F10.920 ALCOHOLIC INTOXICATION WITHOUT COMPLICATION (HCC): Status: RESOLVED | Noted: 2023-05-08 | Resolved: 2024-03-08

## 2024-03-08 PROCEDURE — 99205 OFFICE O/P NEW HI 60 MIN: CPT | Performed by: FAMILY MEDICINE

## 2024-03-08 RX ORDER — DULOXETIN HYDROCHLORIDE 30 MG/1
30 CAPSULE, DELAYED RELEASE ORAL DAILY
Qty: 90 CAPSULE | Refills: 0 | Status: SHIPPED | OUTPATIENT
Start: 2024-03-08

## 2024-03-08 RX ORDER — HYDROXYZINE HYDROCHLORIDE 25 MG/1
25 TABLET, FILM COATED ORAL
Qty: 90 TABLET | Refills: 0 | Status: SHIPPED | OUTPATIENT
Start: 2024-03-08

## 2024-03-08 RX ORDER — ATORVASTATIN CALCIUM 40 MG/1
40 TABLET, FILM COATED ORAL
Qty: 90 TABLET | Refills: 0 | Status: SHIPPED | OUTPATIENT
Start: 2024-03-08

## 2024-03-08 RX ORDER — FUROSEMIDE 40 MG/1
40 TABLET ORAL DAILY
Qty: 90 TABLET | Refills: 0 | Status: SHIPPED | OUTPATIENT
Start: 2024-03-08

## 2024-03-08 RX ORDER — LEVOTHYROXINE SODIUM 137 UG/1
137 TABLET ORAL EVERY MORNING
Qty: 90 TABLET | Refills: 0 | Status: SHIPPED | OUTPATIENT
Start: 2024-03-08

## 2024-03-08 RX ORDER — AMLODIPINE BESYLATE 2.5 MG/1
2.5 TABLET ORAL DAILY
Qty: 90 TABLET | Refills: 1 | Status: SHIPPED | OUTPATIENT
Start: 2024-03-08 | End: 2024-03-12

## 2024-03-08 RX ORDER — HYDRALAZINE HYDROCHLORIDE 100 MG/1
100 TABLET, FILM COATED ORAL 3 TIMES DAILY
Qty: 270 TABLET | Refills: 1 | Status: SHIPPED | OUTPATIENT
Start: 2024-03-08

## 2024-03-08 NOTE — ASSESSMENT & PLAN NOTE
Lab Results   Component Value Date    EGFR 47 07/27/2023    EGFR 79 04/24/2023    EGFR 96 04/20/2023    CREATININE 1.20 07/27/2023    CREATININE 0.82 04/24/2023    CREATININE 0.69 04/20/2023     Likely due to uncontrolled htn, recheck labs today, if worsening consider nephrology referal

## 2024-03-08 NOTE — ASSESSMENT & PLAN NOTE
Very uncontrolled, asymptomatic, ER precautions discussed, Restart medications today, follow up 4 days for recehck very uncontrolled but has only been on hydralzaine, if still elevated consider losartan and/or increase amldoipine dose, restart previous medications at this time, patient with no symptoms, states currently feeling well.

## 2024-03-08 NOTE — PROGRESS NOTES
Assessment/Plan:       Problem List Items Addressed This Visit          Endocrine    Acquired hypothyroidism     Restart medication today, recheck 6-8 weeks after starting medication         Relevant Medications    levothyroxine 137 mcg tablet       Cardiovascular and Mediastinum    Essential hypertension     Very uncontrolled, asymptomatic, ER precautions discussed, Restart medications today, follow up 4 days for recehck very uncontrolled but has only been on hydralzaine, if still elevated consider losartan and/or increase amldoipine dose, restart previous medications at this time, patient with no symptoms, states currently feeling well.         Relevant Medications    hydrALAZINE (APRESOLINE) 100 MG tablet    furosemide (LASIX) 40 mg tablet    amLODIPine (NORVASC) 2.5 mg tablet    Recurrent deep vein thrombosis (DVT) (HCC)     Restart xarelto, has been off for the last month, discussed importance of remaining on medication            Nervous and Auditory    Idiopathic peripheral neuropathy    Relevant Medications    DULoxetine (Cymbalta) 30 mg delayed release capsule       Genitourinary    Stage 3b chronic kidney disease (HCC) - Primary     Lab Results   Component Value Date    EGFR 47 07/27/2023    EGFR 79 04/24/2023    EGFR 96 04/20/2023    CREATININE 1.20 07/27/2023    CREATININE 0.82 04/24/2023    CREATININE 0.69 04/20/2023     Likely due to uncontrolled htn, recheck labs today, if worsening consider nephrology referal         Relevant Medications    furosemide (LASIX) 40 mg tablet    Other Relevant Orders    Comprehensive metabolic panel    CBC    Lipid panel    TSH, 3rd generation with Free T4 reflex       Other    Mixed hyperlipidemia    Relevant Medications    atorvastatin (LIPITOR) 40 mg tablet    Insomnia due to mental condition    Relevant Medications    hydrOXYzine HCL (ATARAX) 25 mg tablet    DULoxetine (Cymbalta) 30 mg delayed release capsule    Depression, recurrent (HCC)    Relevant Medications     hydrOXYzine HCL (ATARAX) 25 mg tablet    DULoxetine (Cymbalta) 30 mg delayed release capsule     Other Visit Diagnoses       Acute deep vein thrombosis (DVT) of calf muscle vein of left lower extremity (HCC)        Relevant Medications    rivaroxaban (Xarelto) 20 mg tablet    TSH (thyroid-stimulating hormone deficiency)        Relevant Medications    levothyroxine 137 mcg tablet    Other Relevant Orders    TSH, 3rd generation with Free T4 reflex              Subjective:      Patient ID: Isa Mcmahon is a 68 y.o. female.    HPI    68 year old female with pmh of htn, CKD 3B, DVT, smoking, depression, presenting to establish care.    Currently feeling other than fatigue well but has been off medication for the 3 months.    Has stopped all BP except hydralazine and thyroid medications, due to poor follow up.    Reports wanting to take better care of herself.    No current chest pain, headache or shortness of breath, does get intermittent headaches that remain unchanged.      Depression Screening and Follow-up Plan: Patient was screened for depression during today's encounter. They screened negative with a PHQ-9 score of 0.    Tobacco Cessation Counseling: Tobacco cessation counseling was provided. The patient is sincerely urged to quit consumption of tobacco. She is not ready to quit tobacco.         The following portions of the patient's history were reviewed and updated as appropriate: allergies, current medications, past family history, past medical history, past social history, past surgical history and problem list.      Current Outpatient Medications:     acetaminophen (TYLENOL) 500 mg tablet, Take 500 mg by mouth every 4 (four) hours as needed, Disp: , Rfl:     albuterol (ProAir HFA) 90 mcg/act inhaler, Inhale 2 puffs every 6 (six) hours as needed for wheezing, Disp: 8.5 g, Rfl: 0    amLODIPine (NORVASC) 2.5 mg tablet, Take 1 tablet (2.5 mg total) by mouth daily, Disp: 90 tablet, Rfl: 1    atorvastatin  (LIPITOR) 40 mg tablet, Take 1 tablet (40 mg total) by mouth daily with dinner, Disp: 90 tablet, Rfl: 0    benzonatate (TESSALON) 200 MG capsule, Take 1 capsule (200 mg total) by mouth 3 (three) times a day as needed for cough, Disp: 30 capsule, Rfl: 0    DULoxetine (Cymbalta) 30 mg delayed release capsule, Take 1 capsule (30 mg total) by mouth daily, Disp: 90 capsule, Rfl: 0    furosemide (LASIX) 40 mg tablet, Take 1 tablet (40 mg total) by mouth daily, Disp: 90 tablet, Rfl: 0    hydrALAZINE (APRESOLINE) 100 MG tablet, Take 1 tablet (100 mg total) by mouth 3 (three) times a day, Disp: 270 tablet, Rfl: 1    hydrOXYzine HCL (ATARAX) 25 mg tablet, Take 1 tablet (25 mg total) by mouth daily at bedtime, Disp: 90 tablet, Rfl: 0    levothyroxine 137 mcg tablet, Take 1 tablet (137 mcg total) by mouth every morning, Disp: 90 tablet, Rfl: 0    methocarbamol (ROBAXIN) 500 mg tablet, Take 500 mg by mouth every 6 (six) hours as needed, Disp: , Rfl:     rivaroxaban (Xarelto) 20 mg tablet, Take 1 tablet (20 mg total) by mouth daily with breakfast, Disp: 90 tablet, Rfl: 0    ferrous sulfate 325 (65 Fe) mg tablet, Take 325 mg by mouth (Patient not taking: Reported on 3/8/2024), Disp: , Rfl:     triamcinolone (KENALOG) 0.1 % ointment, Apply topically 2 (two) times a day (Patient not taking: Reported on 3/8/2024), Disp: 30 g, Rfl: 0    Vitamin D, Cholecalciferol, 25 MCG (1000 UT) TABS, Take 2 tablets by mouth daily (Patient not taking: Reported on 3/8/2024), Disp: , Rfl:      Review of Systems   Constitutional:  Negative for activity change and appetite change.   Respiratory:  Negative for apnea and chest tightness.    Cardiovascular:  Negative for chest pain and palpitations.   Gastrointestinal:  Negative for abdominal distention and abdominal pain.   Musculoskeletal:  Negative for arthralgias and back pain.   Neurological:  Negative for dizziness, light-headedness and headaches.         Objective:      BP (!) 190/110   Pulse 90    Temp 97.8 °F (36.6 °C) (Tympanic)   Resp 16   Wt 77.5 kg (170 lb 12.8 oz)   SpO2 97%   BMI 25.22 kg/m²          Physical Exam  Constitutional:       Appearance: Normal appearance.   Cardiovascular:      Rate and Rhythm: Normal rate and regular rhythm.      Pulses: Normal pulses.      Heart sounds: Normal heart sounds.   Pulmonary:      Effort: Pulmonary effort is normal.      Breath sounds: Normal breath sounds.   Abdominal:      General: Abdomen is flat.      Palpations: Abdomen is soft.   Musculoskeletal:         General: Normal range of motion.   Neurological:      General: No focal deficit present.      Mental Status: She is alert and oriented to person, place, and time.           Dinh Granger MD

## 2024-03-12 ENCOUNTER — APPOINTMENT (OUTPATIENT)
Dept: LAB | Facility: MEDICAL CENTER | Age: 69
End: 2024-03-12
Payer: COMMERCIAL

## 2024-03-12 ENCOUNTER — OFFICE VISIT (OUTPATIENT)
Dept: FAMILY MEDICINE CLINIC | Facility: CLINIC | Age: 69
End: 2024-03-12
Payer: COMMERCIAL

## 2024-03-12 VITALS
OXYGEN SATURATION: 99 % | SYSTOLIC BLOOD PRESSURE: 166 MMHG | WEIGHT: 170 LBS | HEART RATE: 78 BPM | DIASTOLIC BLOOD PRESSURE: 88 MMHG | BODY MASS INDEX: 25.1 KG/M2

## 2024-03-12 DIAGNOSIS — E03.8 TSH (THYROID-STIMULATING HORMONE DEFICIENCY): ICD-10-CM

## 2024-03-12 DIAGNOSIS — N18.32 STAGE 3B CHRONIC KIDNEY DISEASE (HCC): ICD-10-CM

## 2024-03-12 DIAGNOSIS — J45.20 MILD INTERMITTENT ASTHMA WITHOUT COMPLICATION: ICD-10-CM

## 2024-03-12 DIAGNOSIS — I10 ESSENTIAL HYPERTENSION: ICD-10-CM

## 2024-03-12 DIAGNOSIS — N18.32 STAGE 3B CHRONIC KIDNEY DISEASE (HCC): Primary | ICD-10-CM

## 2024-03-12 PROBLEM — F14.10 COCAINE ABUSE (HCC): Status: RESOLVED | Noted: 2024-03-08 | Resolved: 2024-03-12

## 2024-03-12 LAB
ALBUMIN SERPL BCP-MCNC: 4.1 G/DL (ref 3.5–5)
ALP SERPL-CCNC: 121 U/L (ref 34–104)
ALT SERPL W P-5'-P-CCNC: 14 U/L (ref 7–52)
ANION GAP SERPL CALCULATED.3IONS-SCNC: 9 MMOL/L (ref 4–13)
AST SERPL W P-5'-P-CCNC: 21 U/L (ref 13–39)
BILIRUB SERPL-MCNC: 0.51 MG/DL (ref 0.2–1)
BUN SERPL-MCNC: 22 MG/DL (ref 5–25)
CALCIUM SERPL-MCNC: 8.6 MG/DL (ref 8.4–10.2)
CHLORIDE SERPL-SCNC: 102 MMOL/L (ref 96–108)
CHOLEST SERPL-MCNC: 177 MG/DL
CO2 SERPL-SCNC: 31 MMOL/L (ref 21–32)
CREAT SERPL-MCNC: 0.99 MG/DL (ref 0.6–1.3)
ERYTHROCYTE [DISTWIDTH] IN BLOOD BY AUTOMATED COUNT: 13.2 % (ref 11.6–15.1)
GFR SERPL CREATININE-BSD FRML MDRD: 59 ML/MIN/1.73SQ M
GLUCOSE P FAST SERPL-MCNC: 117 MG/DL (ref 65–99)
HCT VFR BLD AUTO: 37.9 % (ref 34.8–46.1)
HDLC SERPL-MCNC: 69 MG/DL
HGB BLD-MCNC: 12.1 G/DL (ref 11.5–15.4)
LDLC SERPL CALC-MCNC: 89 MG/DL (ref 0–100)
MCH RBC QN AUTO: 31.5 PG (ref 26.8–34.3)
MCHC RBC AUTO-ENTMCNC: 31.9 G/DL (ref 31.4–37.4)
MCV RBC AUTO: 99 FL (ref 82–98)
NONHDLC SERPL-MCNC: 108 MG/DL
PLATELET # BLD AUTO: 238 THOUSANDS/UL (ref 149–390)
PMV BLD AUTO: 10.5 FL (ref 8.9–12.7)
POTASSIUM SERPL-SCNC: 3.7 MMOL/L (ref 3.5–5.3)
PROT SERPL-MCNC: 6.9 G/DL (ref 6.4–8.4)
RBC # BLD AUTO: 3.84 MILLION/UL (ref 3.81–5.12)
SODIUM SERPL-SCNC: 142 MMOL/L (ref 135–147)
T4 FREE SERPL-MCNC: 0.82 NG/DL (ref 0.61–1.12)
TRIGL SERPL-MCNC: 97 MG/DL
TSH SERPL DL<=0.05 MIU/L-ACNC: 20.5 UIU/ML (ref 0.45–4.5)
WBC # BLD AUTO: 4.53 THOUSAND/UL (ref 4.31–10.16)

## 2024-03-12 PROCEDURE — 84443 ASSAY THYROID STIM HORMONE: CPT

## 2024-03-12 PROCEDURE — 85027 COMPLETE CBC AUTOMATED: CPT

## 2024-03-12 PROCEDURE — 80053 COMPREHEN METABOLIC PANEL: CPT

## 2024-03-12 PROCEDURE — 84439 ASSAY OF FREE THYROXINE: CPT

## 2024-03-12 PROCEDURE — 80061 LIPID PANEL: CPT

## 2024-03-12 PROCEDURE — G2211 COMPLEX E/M VISIT ADD ON: HCPCS | Performed by: FAMILY MEDICINE

## 2024-03-12 PROCEDURE — 36415 COLL VENOUS BLD VENIPUNCTURE: CPT

## 2024-03-12 PROCEDURE — 99214 OFFICE O/P EST MOD 30 MIN: CPT | Performed by: FAMILY MEDICINE

## 2024-03-12 RX ORDER — AMLODIPINE BESYLATE 2.5 MG/1
5 TABLET ORAL DAILY
Start: 2024-03-12

## 2024-03-12 RX ORDER — ALBUTEROL SULFATE 90 UG/1
2 AEROSOL, METERED RESPIRATORY (INHALATION) EVERY 6 HOURS PRN
Qty: 8.5 G | Refills: 1 | Status: SHIPPED | OUTPATIENT
Start: 2024-03-12

## 2024-03-12 RX ORDER — LOSARTAN POTASSIUM 50 MG/1
50 TABLET ORAL DAILY
Qty: 30 TABLET | Refills: 5 | Status: SHIPPED | OUTPATIENT
Start: 2024-03-12

## 2024-03-12 NOTE — ASSESSMENT & PLAN NOTE
Labs drawn today, increase amlodpipne and start losartan, patient reports side effect with lisinopril in past, improved but still elevated, follow up 2 weeks, call with BP in the meantime

## 2024-03-12 NOTE — PROGRESS NOTES
Assessment/Plan:       Problem List Items Addressed This Visit          Cardiovascular and Mediastinum    Essential hypertension     Labs drawn today, increase amlodpipne and start losartan, patient reports side effect with lisinopril in past, improved but still elevated, follow up 2 weeks, call with BP in the meantime         Relevant Medications    amLODIPine (NORVASC) 2.5 mg tablet    losartan (COZAAR) 50 mg tablet       Genitourinary    Stage 3b chronic kidney disease (HCC) - Primary    Relevant Medications    losartan (COZAAR) 50 mg tablet     Other Visit Diagnoses       Mild intermittent asthma without complication        Relevant Medications    albuterol (ProAir HFA) 90 mcg/act inhaler              Subjective:      Patient ID: Isa Mcmahon is a 68 y.o. female.    HPI      68 year old with pmh of DVT, CKD 3B, presenting for BP follow up.    Restarted previous regimen at last visit, has been feeling well with no side effects.    Reports having headaches, prior to restarting medications that have since resolved.    The following portions of the patient's history were reviewed and updated as appropriate: allergies, current medications, past family history, past medical history, past social history, past surgical history and problem list.      Current Outpatient Medications:     albuterol (ProAir HFA) 90 mcg/act inhaler, Inhale 2 puffs every 6 (six) hours as needed for wheezing or shortness of breath, Disp: 8.5 g, Rfl: 1    amLODIPine (NORVASC) 2.5 mg tablet, Take 2 tablets (5 mg total) by mouth daily, Disp: , Rfl:     losartan (COZAAR) 50 mg tablet, Take 1 tablet (50 mg total) by mouth daily, Disp: 30 tablet, Rfl: 5    acetaminophen (TYLENOL) 500 mg tablet, Take 500 mg by mouth every 4 (four) hours as needed, Disp: , Rfl:     albuterol (ProAir HFA) 90 mcg/act inhaler, Inhale 2 puffs every 6 (six) hours as needed for wheezing, Disp: 8.5 g, Rfl: 0    atorvastatin (LIPITOR) 40 mg tablet, Take 1 tablet (40 mg  total) by mouth daily with dinner, Disp: 90 tablet, Rfl: 0    benzonatate (TESSALON) 200 MG capsule, Take 1 capsule (200 mg total) by mouth 3 (three) times a day as needed for cough, Disp: 30 capsule, Rfl: 0    DULoxetine (Cymbalta) 30 mg delayed release capsule, Take 1 capsule (30 mg total) by mouth daily, Disp: 90 capsule, Rfl: 0    furosemide (LASIX) 40 mg tablet, Take 1 tablet (40 mg total) by mouth daily, Disp: 90 tablet, Rfl: 0    hydrALAZINE (APRESOLINE) 100 MG tablet, Take 1 tablet (100 mg total) by mouth 3 (three) times a day, Disp: 270 tablet, Rfl: 1    hydrOXYzine HCL (ATARAX) 25 mg tablet, Take 1 tablet (25 mg total) by mouth daily at bedtime, Disp: 90 tablet, Rfl: 0    levothyroxine 137 mcg tablet, Take 1 tablet (137 mcg total) by mouth every morning, Disp: 90 tablet, Rfl: 0    methocarbamol (ROBAXIN) 500 mg tablet, Take 500 mg by mouth every 6 (six) hours as needed, Disp: , Rfl:     rivaroxaban (Xarelto) 20 mg tablet, Take 1 tablet (20 mg total) by mouth daily with breakfast, Disp: 90 tablet, Rfl: 0    triamcinolone (KENALOG) 0.1 % ointment, Apply topically 2 (two) times a day (Patient not taking: Reported on 3/8/2024), Disp: 30 g, Rfl: 0    Vitamin D, Cholecalciferol, 25 MCG (1000 UT) TABS, Take 2 tablets by mouth daily (Patient not taking: Reported on 3/8/2024), Disp: , Rfl:      Review of Systems   Constitutional:  Negative for activity change and appetite change.   Respiratory:  Negative for apnea and chest tightness.    Cardiovascular:  Negative for chest pain and palpitations.   Gastrointestinal:  Negative for abdominal distention and abdominal pain.   Musculoskeletal:  Negative for arthralgias and back pain.         Objective:      /88 (BP Location: Left arm, Patient Position: Sitting, Cuff Size: Large)   Pulse 78   Wt 77.1 kg (170 lb)   SpO2 99%   BMI 25.10 kg/m²          Physical Exam  Constitutional:       Appearance: Normal appearance.   Cardiovascular:      Rate and Rhythm: Normal  rate and regular rhythm.      Pulses: Normal pulses.      Heart sounds: Normal heart sounds.   Pulmonary:      Effort: Pulmonary effort is normal.      Breath sounds: Normal breath sounds.   Musculoskeletal:         General: Normal range of motion.   Neurological:      General: No focal deficit present.      Mental Status: She is alert and oriented to person, place, and time.           Dinh Granger MD

## 2024-03-12 NOTE — PATIENT INSTRUCTIONS
1. Stage 3b chronic kidney disease (HCC)  -     losartan (COZAAR) 50 mg tablet; Take 1 tablet (50 mg total) by mouth daily    2. Tobacco abuse    3. Mild intermittent asthma without complication  -     albuterol (ProAir HFA) 90 mcg/act inhaler; Inhale 2 puffs every 6 (six) hours as needed for wheezing or shortness of breath    4. Essential hypertension  -     amLODIPine (NORVASC) 2.5 mg tablet; Take 2 tablets (5 mg total) by mouth daily

## 2024-05-16 ENCOUNTER — TELEPHONE (OUTPATIENT)
Dept: FAMILY MEDICINE CLINIC | Facility: CLINIC | Age: 69
End: 2024-05-16

## 2024-05-16 NOTE — TELEPHONE ENCOUNTER
Called Pt to schedule appt for AWN. No answer LM advising CB. Upon return call please schedule  accordingly.

## 2024-05-23 DIAGNOSIS — J45.20 MILD INTERMITTENT ASTHMA WITHOUT COMPLICATION: ICD-10-CM

## 2024-05-24 RX ORDER — ALBUTEROL SULFATE 90 UG/1
AEROSOL, METERED RESPIRATORY (INHALATION)
Qty: 9 G | Refills: 0 | Status: SHIPPED | OUTPATIENT
Start: 2024-05-24

## 2024-07-01 ENCOUNTER — TELEPHONE (OUTPATIENT)
Dept: ADMINISTRATIVE | Facility: OTHER | Age: 69
End: 2024-07-01

## 2024-07-01 NOTE — TELEPHONE ENCOUNTER
07/01/24 1:01 PM    Patient was flagged by a Third Party Payer report as being due for a refill on the following medications, losartin. Patient was contacted to review these medications. Inbox full/ phone number disconnected; a message could not be left for the patient.     Thank you.  Elli Carter MA  [facility]

## 2024-07-08 DIAGNOSIS — N18.32 STAGE 3B CHRONIC KIDNEY DISEASE (HCC): ICD-10-CM

## 2024-07-08 DIAGNOSIS — E03.9 ACQUIRED HYPOTHYROIDISM: ICD-10-CM

## 2024-07-08 DIAGNOSIS — G60.9 IDIOPATHIC PERIPHERAL NEUROPATHY: ICD-10-CM

## 2024-07-08 DIAGNOSIS — I82.462 ACUTE DEEP VEIN THROMBOSIS (DVT) OF CALF MUSCLE VEIN OF LEFT LOWER EXTREMITY (HCC): ICD-10-CM

## 2024-07-08 DIAGNOSIS — I10 ESSENTIAL HYPERTENSION: ICD-10-CM

## 2024-07-08 DIAGNOSIS — E78.2 MIXED HYPERLIPIDEMIA: ICD-10-CM

## 2024-07-08 DIAGNOSIS — F51.05 INSOMNIA DUE TO MENTAL CONDITION: ICD-10-CM

## 2024-07-08 RX ORDER — HYDROXYZINE HYDROCHLORIDE 25 MG/1
25 TABLET, FILM COATED ORAL
Qty: 100 TABLET | Refills: 1 | Status: SHIPPED | OUTPATIENT
Start: 2024-07-08

## 2024-07-08 RX ORDER — LOSARTAN POTASSIUM 50 MG/1
50 TABLET ORAL DAILY
Qty: 30 TABLET | Refills: 5 | Status: SHIPPED | OUTPATIENT
Start: 2024-07-08

## 2024-07-08 RX ORDER — ATORVASTATIN CALCIUM 40 MG/1
40 TABLET, FILM COATED ORAL
Qty: 100 TABLET | Refills: 1 | Status: SHIPPED | OUTPATIENT
Start: 2024-07-08

## 2024-07-08 RX ORDER — LEVOTHYROXINE SODIUM 137 UG/1
137 TABLET ORAL EVERY MORNING
Qty: 100 TABLET | Refills: 1 | Status: SHIPPED | OUTPATIENT
Start: 2024-07-08

## 2024-07-08 RX ORDER — FUROSEMIDE 40 MG/1
40 TABLET ORAL DAILY
Qty: 100 TABLET | Refills: 1 | Status: SHIPPED | OUTPATIENT
Start: 2024-07-08

## 2024-07-08 RX ORDER — DULOXETIN HYDROCHLORIDE 30 MG/1
30 CAPSULE, DELAYED RELEASE ORAL DAILY
Qty: 100 CAPSULE | Refills: 1 | Status: SHIPPED | OUTPATIENT
Start: 2024-07-08

## 2024-07-08 NOTE — TELEPHONE ENCOUNTER
Pt needs refill of all these medications     1.furosemide (LASIX) 40 mg tablet [[id number]]    Order Details  Dose: 40 mg Route: Oral Frequency: Daily   Dispense Quantity: 90 tablet Refills: 0          Sig: Take 1 tablet (40 mg total) by mouth daily   2.  atorvastatin (LIPITOR) 40 mg tablet [[id number]]    Order Details  Dose: 40 mg Route: Oral Frequency: Daily with dinner   Dispense Quantity: 90 tablet Refills: 0          Sig: Take 1 tablet (40 mg total) by mouth daily with dinner   3.  hydrOXYzine HCL (ATARAX) 25 mg tablet [[id number]]    Order Details    Dose: 25 mg Route: Oral Frequency: Daily at bedtime   Dispense Quantity: 90 tablet Refills: 0          Sig: Take 1 tablet (25 mg total) by mouth daily at bedtime         4.rivaroxaban (Xarelto) 20 mg tablet [[id number]]    Order Details  Dose: 20 mg Route: Oral Frequency: Daily with breakfast   Dispense Quantity: 90 tablet Refills: 0          Sig: Take 1 tablet (20 mg total) by mouth daily with breakfast   5.DULoxetine (Cymbalta) 30 mg delayed release capsule [[id number]]    Order Details  Dose: 30 mg Route: Oral Frequency: Daily   Dispense Quantity: 90 capsule Refills: 0          Sig: Take 1 capsule (30 mg total) by mouth daily   6.levothyroxine 137 mcg tablet [[id number]]    Order Details  Dose: 137 mcg Route: Oral Frequency: Every morning   Dispense Quantity: 90 tablet Refills: 0          Sig: Take 1 tablet (137 mcg total) by mouth every morning   7.  losartan (COZAAR) 50 mg tablet [[id number]]    Order Details  Dose: 50 mg Route: Oral Frequency: Daily   Dispense Quantity: 30 tablet Refills: 5          Sig: Take 1 tablet (50 mg total) by mouth daily     Pt has none left and wants it to go to [facility] [address] [place] [address] - [address] [place] [address] [[id number]]

## 2024-08-07 ENCOUNTER — OFFICE VISIT (OUTPATIENT)
Dept: FAMILY MEDICINE CLINIC | Facility: CLINIC | Age: 69
End: 2024-08-07
Payer: COMMERCIAL

## 2024-08-07 VITALS
DIASTOLIC BLOOD PRESSURE: 74 MMHG | WEIGHT: 180.2 LBS | BODY MASS INDEX: 26.69 KG/M2 | TEMPERATURE: 97 F | OXYGEN SATURATION: 96 % | HEIGHT: 69 IN | HEART RATE: 97 BPM | SYSTOLIC BLOOD PRESSURE: 136 MMHG | RESPIRATION RATE: 16 BRPM

## 2024-08-07 DIAGNOSIS — E78.5 HYPERLIPIDEMIA, UNSPECIFIED HYPERLIPIDEMIA TYPE: ICD-10-CM

## 2024-08-07 DIAGNOSIS — I10 ESSENTIAL HYPERTENSION: ICD-10-CM

## 2024-08-07 DIAGNOSIS — M18.0 PRIMARY OSTEOARTHRITIS OF BOTH FIRST CARPOMETACARPAL JOINTS: ICD-10-CM

## 2024-08-07 DIAGNOSIS — E03.9 ACQUIRED HYPOTHYROIDISM: ICD-10-CM

## 2024-08-07 DIAGNOSIS — R74.01 TRANSAMINITIS: ICD-10-CM

## 2024-08-07 DIAGNOSIS — Z12.11 SCREENING FOR COLON CANCER: ICD-10-CM

## 2024-08-07 DIAGNOSIS — N18.32 STAGE 3B CHRONIC KIDNEY DISEASE (HCC): ICD-10-CM

## 2024-08-07 DIAGNOSIS — I82.409 RECURRENT DEEP VEIN THROMBOSIS (DVT) (HCC): ICD-10-CM

## 2024-08-07 DIAGNOSIS — M1A.39X0 CHRONIC GOUT DUE TO RENAL IMPAIRMENT OF MULTIPLE SITES WITHOUT TOPHUS: ICD-10-CM

## 2024-08-07 DIAGNOSIS — Z86.73 HISTORY OF CVA (CEREBROVASCULAR ACCIDENT): ICD-10-CM

## 2024-08-07 DIAGNOSIS — Z00.00 MEDICARE ANNUAL WELLNESS VISIT, SUBSEQUENT: Primary | ICD-10-CM

## 2024-08-07 DIAGNOSIS — Z12.31 ENCOUNTER FOR SCREENING MAMMOGRAM FOR MALIGNANT NEOPLASM OF BREAST: ICD-10-CM

## 2024-08-07 DIAGNOSIS — Z72.0 TOBACCO ABUSE: Chronic | ICD-10-CM

## 2024-08-07 PROCEDURE — G0439 PPPS, SUBSEQ VISIT: HCPCS | Performed by: FAMILY MEDICINE

## 2024-08-07 PROCEDURE — 99214 OFFICE O/P EST MOD 30 MIN: CPT | Performed by: FAMILY MEDICINE

## 2024-08-07 RX ORDER — COLCHICINE 0.6 MG/1
1 CAPSULE ORAL 2 TIMES DAILY
Qty: 3 CAPSULE | Refills: 1 | Status: SHIPPED | OUTPATIENT
Start: 2024-08-07

## 2024-08-07 RX ORDER — AMLODIPINE BESYLATE 2.5 MG/1
2.5 TABLET ORAL DAILY
Start: 2024-08-07

## 2024-08-07 NOTE — PROGRESS NOTES
Ambulatory Visit  Name: Isa Mcmahon      : 1955      MRN: [id number]  Encounter Provider: Dinh Granger MD  Encounter Date: 2024   Encounter department: [facility]    Assessment & Plan   1. Medicare annual wellness visit, subsequent  2. Recurrent deep vein thrombosis (DVT) (HCC)  Assessment & Plan:  Remains on xarelto with no reoccurrence   3. Stage 3b chronic kidney disease (HCC)  Assessment & Plan:  Rehcekck labs, did improve on last check, BP now controlled.    Lab Results   Component Value Date    EGFR 59 2024    EGFR 47 2023    EGFR 79 2023    CREATININE 0.99 2024    CREATININE 1.20 2023    CREATININE 0.82 2023     4. History of CVA (cerebrovascular accident)  5. Tobacco abuse  6. Screening for colon cancer  -     Ambulatory Referral to Gastroenterology; Future  7. Transaminitis  -     Comprehensive metabolic panel; Future  8. Hyperlipidemia, unspecified hyperlipidemia type  -     Lipid panel; Future  9. Acquired hypothyroidism  -     TSH, 3rd generation with Free T4 reflex; Future  10. Essential hypertension  -     amLODIPine (NORVASC) 2.5 mg tablet; Take 1 tablet (2.5 mg total) by mouth daily  11. Encounter for screening mammogram for malignant neoplasm of breast  -     Mammo screening bilateral w cad; Future  12. 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 Primary osteoarthritis of both first carpometacarpal joints  Assessment & Plan:  Tylenol as needed, xrays if worsening        Depression Screening and Follow-up Plan: Patient was screened for depression during today's encounter. They screened negative with a PHQ-9 score of  0.    Tobacco Cessation Counseling: Tobacco cessation counseling was provided. The patient is sincerely urged to quit consumption of tobacco. She is not ready to quit tobacco.       Preventive health issues were discussed with patient, and age appropriate screening tests were ordered as noted in patient's After Visit Summary. Personalized health advice and appropriate referrals for health education or preventive services given if needed, as noted in patient's After Visit Summary.    History of Present Illness     68 year old female presenting in follow up of htn, hld, goiut.      She is having foot pain for one day, similar to previous gout flair.    Bilateral thumb pain for last year worse on right.    No side effects of BP meds, was on 2.5 amlodipine       Patient Care Team:  Dinh Granger MD as PCP - General (Family Medicine)  Fabiana Velazco DO as PCP - PCP-[employer] ([facility])  Evangelina Garay MD as PCP - PCP-[facility] ([facility])  Barbara Wayne MD as Endoscopist    Review of Systems   Constitutional:  Negative for activity change and appetite change.   Respiratory:  Negative for apnea and chest tightness.    Cardiovascular:  Negative for chest pain and palpitations.   Gastrointestinal:  Negative for abdominal distention and abdominal pain.   Musculoskeletal:  Negative for arthralgias and back pain.     Medical History Reviewed by provider this encounter:  Problems       Annual Wellness Visit Questionnaire   Isa is here for her Subsequent Wellness visit.     Health Risk Assessment:   Patient rates overall health as good. Patient feels that their physical health rating is much better. Patient is very satisfied with their life. Eyesight was rated as same. Hearing was rated as same. Patient feels that their emotional and mental health rating is same. Patients states they are sometimes angry. Patient states they are never, rarely unusually tired/fatigued. Pain experienced in the last 7  days has been some. Patient's pain rating has been 9/10. Patient states that she has experienced no weight loss or gain in last 6 months. Pain (gout flar)    Depression Screening:   PHQ-9 Score: 0      Fall Risk Screening:   In the past year, patient has experienced: history of falling in past year    Number of falls: 1  Injured during fall?: No    Feels unsteady when standing or walking?: No    Worried about falling?: No      Urinary Incontinence Screening:   Patient has not leaked urine accidently in the last six months.     Home Safety:  Patient does not have trouble with stairs inside or outside of their home. Patient has no working smoke alarms and has no working carbon monoxide detector. Home safety hazards include: none.     Nutrition:   Current diet is Regular.     Medications:   Patient is not currently taking any over-the-counter supplements. Patient is able to manage medications.     Activities of Daily Living (ADLs)/Instrumental Activities of Daily Living (IADLs):   Walk and transfer into and out of bed and chair?: Yes  Dress and groom yourself?: Yes    Bathe or shower yourself?: Yes    Feed yourself? Yes  Do your laundry/housekeeping?: Yes  Manage your money, pay your bills and track your expenses?: Yes  Make your own meals?: Yes    Do your own shopping?: Yes    Previous Hospitalizations:   Any hospitalizations or ED visits within the last 12 months?: No      Advance Care Planning:   Living will: No    Durable POA for healthcare: No    Advanced directive: No    ACP document given: Yes      PREVENTIVE SCREENINGS      Cardiovascular Screening:    General: Screening Not Indicated and History Lipid Disorder      Diabetes Screening:     General: Screening Current      Colorectal Cancer Screening:     General: Risks and Benefits Discussed      Breast Cancer Screening:     General: Risks and Benefits Discussed      Cervical Cancer Screening:    General: Screening Not Indicated      Osteoporosis Screening:     "General: Patient Declines      Abdominal Aortic Aneurysm (AAA) Screening:        General: Screening Not Indicated      Lung Cancer Screening:     General: Screening Not Indicated      Hepatitis C Screening:    General: Screening Current    Screening, Brief Intervention, and Referral to Treatment (SBIRT)    Screening    Typical number of drinks in a week: 2    Single Item Drug Screening:  How often have you used an illegal drug (including marijuana) or a prescription medication for non-medical reasons in the past year? never    Single Item Drug Screen Score: 0  Interpretation: Negative screen for possible drug use disorder    Social Determinants of Health     Financial Resource Strain: Patient Declined (4/10/2023)    [facility]    Overall Financial Resource Strain (CARDIA)     Difficulty of Paying Living Expenses: Patient declined   Food Insecurity: No Food Insecurity (8/7/2024)    Hunger Vital Sign     Worried About Running Out of Food in the Last Year: Never true     Ran Out of Food in the Last Year: Never true   Transportation Needs: No Transportation Needs (8/7/2024)    PRAPARE - Transportation     Lack of Transportation (Medical): No     Lack of Transportation (Non-Medical): No   Housing Stability: Unknown (8/7/2024)    Housing Stability Vital Sign     Unable to Pay for Housing in the Last Year: No     Homeless in the Last Year: No   Utilities: Not At Risk (8/7/2024)    [facility] Utilities     Threatened with loss of utilities: No     No results found.    Objective     /74 (BP Location: Left arm, Patient Position: Sitting, Cuff Size: Large)   Pulse 97   Temp (!) 97 °F (36.1 °C) (Tympanic)   Resp 16   Ht 5' 9\" (1.753 m)   Wt 81.7 kg (180 lb 3.2 oz)   SpO2 96%   BMI 26.61 kg/m²     Physical Exam  Constitutional:       Appearance: Normal appearance.   Cardiovascular:      Rate and Rhythm: Normal rate and regular rhythm.      Pulses: Normal pulses. "      Heart sounds: Normal heart sounds.   Pulmonary:      Effort: Pulmonary effort is normal.      Breath sounds: Normal breath sounds.   Musculoskeletal:         General: Normal range of motion.      Comments: Left tender around 2nd metatarsal   Neurological:      General: No focal deficit present.      Mental Status: She is alert and oriented to person, place, and time.

## 2024-08-07 NOTE — PATIENT INSTRUCTIONS
Medicare Preventive Visit Patient Instructions  Thank you for completing your Welcome to Medicare Visit or Medicare Annual Wellness Visit today. Your next wellness visit will be due in one year (8/8/2025).  The screening/preventive services that you may require over the next 5-10 years are detailed below. Some tests may not apply to you based off risk factors and/or age. Screening tests ordered at today's visit but not completed yet may show as past due. Also, please note that scanned in results may not display below.  Preventive Screenings:  Service Recommendations Previous Testing/Comments   Colorectal Cancer Screening  * Colonoscopy    * Fecal Occult Blood Test (FOBT)/Fecal Immunochemical Test (FIT)  * Fecal DNA/Cologuard Test  * Flexible Sigmoidoscopy Age: 45-75 years old   Colonoscopy: every 10 years (may be performed more frequently if at higher risk)  OR  FOBT/FIT: every 1 year  OR  Cologuard: every 3 years  OR  Sigmoidoscopy: every 5 years  Screening may be recommended earlier than age 45 if at higher risk for colorectal cancer. Also, an individualized decision between you and your healthcare provider will decide whether screening between the ages of 76-85 would be appropriate. Colonoscopy: 05/31/2016  FOBT/FIT: Not on file  Cologuard: Not on file  Sigmoidoscopy: Not on file          Breast Cancer Screening Age: 40+ years old  Frequency: every 1-2 years  Not required if history of left and right mastectomy Mammogram: 06/16/2022        Cervical Cancer Screening Between the ages of 21-29, pap smear recommended once every 3 years.   Between the ages of 30-65, can perform pap smear with HPV co-testing every 5 years.   Recommendations may differ for women with a history of total hysterectomy, cervical cancer, or abnormal pap smears in past. Pap Smear: 07/21/2020        Hepatitis C Screening Once for adults born between 1945 and 1965  More frequently in patients at high risk for Hepatitis C Hep C Antibody: Not on  file        Diabetes Screening 1-2 times per year if you're at risk for diabetes or have pre-diabetes Fasting glucose: 117 mg/dL (3/12/2024)  A1C: 5.6 % (4/12/2023)      Cholesterol Screening Once every 5 years if you don't have a lipid disorder. May order more often based on risk factors. Lipid panel: 03/12/2024          Other Preventive Screenings Covered by Medicare:  Abdominal Aortic Aneurysm (AAA) Screening: covered once if your at risk. You're considered to be at risk if you have a family history of AAA.  Lung Cancer Screening: covers low dose CT scan once per year if you meet all of the following conditions: (1) Age 55-77; (2) No signs or symptoms of lung cancer; (3) Current smoker or have quit smoking within the last 15 years; (4) You have a tobacco smoking history of at least 20 pack years (packs per day multiplied by number of years you smoked); (5) You get a written order from a healthcare provider.  Glaucoma Screening: covered annually if you're considered high risk: (1) You have diabetes OR (2) Family history of glaucoma OR (3)  aged 50 and older OR (4)  American aged 65 and older  Osteoporosis Screening: covered every 2 years if you meet one of the following conditions: (1) You're estrogen deficient and at risk for osteoporosis based off medical history and other findings; (2) Have a vertebral abnormality; (3) On glucocorticoid therapy for more than 3 months; (4) Have primary hyperparathyroidism; (5) On osteoporosis medications and need to assess response to drug therapy.   Last bone density test (DXA Scan): 06/22/2022.  HIV Screening: covered annually if you're between the age of 15-65. Also covered annually if you are younger than 15 and older than 65 with risk factors for HIV infection. For pregnant patients, it is covered up to 3 times per pregnancy.    Immunizations:  Immunization Recommendations   Influenza Vaccine Annual influenza vaccination during flu season is  recommended for all persons aged >= 6 months who do not have contraindications   Pneumococcal Vaccine   * Pneumococcal conjugate vaccine = PCV13 (Prevnar 13), PCV15 (Vaxneuvance), PCV20 (Prevnar 20)  * Pneumococcal polysaccharide vaccine = PPSV23 (Pneumovax) Adults 19-65 yo with certain risk factors or if 65+ yo  If never received any pneumonia vaccine: recommend Prevnar 20 (PCV20)  Give PCV20 if previously received 1 dose of PCV13 or PPSV23   Hepatitis B Vaccine 3 dose series if at intermediate or high risk (ex: diabetes, end stage renal disease, liver disease)   Respiratory syncytial virus (RSV) Vaccine - COVERED BY MEDICARE PART D  * RSVPreF3 (Arexvy) CDC recommends that adults 60 years of age and older may receive a single dose of RSV vaccine using shared clinical decision-making (SCDM)   Tetanus (Td) Vaccine - COST NOT COVERED BY MEDICARE PART B Following completion of primary series, a booster dose should be given every 10 years to maintain immunity against tetanus. Td may also be given as tetanus wound prophylaxis.   Tdap Vaccine - COST NOT COVERED BY MEDICARE PART B Recommended at least once for all adults. For pregnant patients, recommended with each pregnancy.   Shingles Vaccine (Shingrix) - COST NOT COVERED BY MEDICARE PART B  2 shot series recommended in those 19 years and older who have or will have weakened immune systems or those 50 years and older     Health Maintenance Due:      Topic Date Due   • Hepatitis C Screening  Never done   • Colorectal Cancer Screening  05/31/2021   • Breast Cancer Screening: Mammogram  06/16/2024     Immunizations Due:      Topic Date Due   • COVID-19 Vaccine (3 - 2023-24 season) 09/01/2023   • Influenza Vaccine (1) 09/01/2024     Advance Directives   What are advance directives?  Advance directives are legal documents that state your wishes and plans for medical care. These plans are made ahead of time in case you lose your ability to make decisions for yourself. Advance  directives can apply to any medical decision, such as the treatments you want, and if you want to donate organs.   What are the types of advance directives?  There are many types of advance directives, and each state has rules about how to use them. You may choose a combination of any of the following:  Living will:  This is a written record of the treatment you want. You can also choose which treatments you do not want, which to limit, and which to stop at a certain time. This includes surgery, medicine, IV fluid, and tube feedings.   Durable power of  for healthcare (DPAHC):  This is a written record that states who you want to make healthcare choices for you when you are unable to make them for yourself. This person, called a proxy, is usually a family member or a friend. You may choose more than 1 proxy.  Do not resuscitate (DNR) order:  A DNR order is used in case your heart stops beating or you stop breathing. It is a request not to have certain forms of treatment, such as CPR. A DNR order may be included in other types of advance directives.  Medical directive:  This covers the care that you want if you are in a coma, near death, or unable to make decisions for yourself. You can list the treatments you want for each condition. Treatment may include pain medicine, surgery, blood transfusions, dialysis, IV or tube feedings, and a ventilator (breathing machine).  Values history:  This document has questions about your views, beliefs, and how you feel and think about life. This information can help others choose the care that you would choose.  Why are advance directives important?  An advance directive helps you control your care. Although spoken wishes may be used, it is better to have your wishes written down. Spoken wishes can be misunderstood, or not followed. Treatments may be given even if you do not want them. An advance directive may make it easier for your family to make difficult choices about  your care.   Cigarette Smoking and Your Health   Risks to your health if you smoke:  Nicotine and other chemicals found in tobacco damage every cell in your body. Even if you are a light smoker, you have an increased risk for cancer, heart disease, and lung disease. If you are pregnant or have diabetes, smoking increases your risk for complications.   Benefits to your health if you stop smoking:   You decrease respiratory symptoms such as coughing, wheezing, and shortness of breath.   You reduce your risk for cancers of the lung, mouth, throat, kidney, bladder, pancreas, stomach, and cervix. If you already have cancer, you increase the benefits of chemotherapy. You also reduce your risk for cancer returning or a second cancer from developing.   You reduce your risk for heart disease, blood clots, heart attack, and stroke.   You reduce your risk for lung infections, and diseases such as pneumonia, asthma, chronic bronchitis, and emphysema.  Your circulation improves. More oxygen can be delivered to your body. If you have diabetes, you lower your risk for complications, such as kidney, artery, and eye diseases. You also lower your risk for nerve damage. Nerve damage can lead to amputations, poor vision, and blindness.  You improve your body's ability to heal and to fight infections.  For more information and support to stop smoking:   [employer]  Phone: 8- 785 - 047-9182  Web Address: www.[employer]  Weight Management   Why it is important to manage your weight:  Being overweight increases your risk of health conditions such as heart disease, high blood pressure, type 2 diabetes, and certain types of cancer. It can also increase your risk for osteoarthritis, sleep apnea, and other respiratory problems. Aim for a slow, steady weight loss. Even a small amount of weight loss can lower your risk of health problems.  How to lose weight safely:  A safe and healthy way to lose weight is to eat fewer calories and get  regular exercise. You can lose up about 1 pound a week by decreasing the number of calories you eat by 500 calories each day.   Healthy meal plan for weight management:  A healthy meal plan includes a variety of foods, contains fewer calories, and helps you stay healthy. A healthy meal plan includes the following:  Eat whole-grain foods more often.  A healthy meal plan should contain fiber. Fiber is the part of grains, fruits, and vegetables that is not broken down by your body. Whole-grain foods are healthy and provide extra fiber in your diet. Some examples of whole-grain foods are whole-wheat breads and pastas, oatmeal, brown rice, and bulgur.  Eat a variety of vegetables every day.  Include dark, leafy greens such as spinach, kale, diandra greens, and mustard greens. Eat yellow and orange vegetables such as carrots, sweet potatoes, and winter squash.   Eat a variety of fruits every day.  Choose fresh or canned fruit (canned in its own juice or light syrup) instead of juice. Fruit juice has very little or no fiber.  Eat low-fat dairy foods.  Drink fat-free (skim) milk or 1% milk. Eat fat-free yogurt and low-fat cottage cheese. Try low-fat cheeses such as mozzarella and other reduced-fat cheeses.  Choose meat and other protein foods that are low in fat.  Choose beans or other legumes such as split peas or lentils. Choose fish, skinless poultry (chicken or turkey), or lean cuts of red meat (beef or pork). Before you cook meat or poultry, cut off any visible fat.   Use less fat and oil.  Try baking foods instead of frying them. Add less fat, such as margarine, sour cream, regular salad dressing and mayonnaise to foods. Eat fewer high-fat foods. Some examples of high-fat foods include french fries, doughnuts, ice cream, and cakes.  Eat fewer sweets.  Limit foods and drinks that are high in sugar. This includes candy, cookies, regular soda, and sweetened drinks.  Exercise:  Exercise at least 30 minutes per day on most  days of the week. Some examples of exercise include walking, biking, dancing, and swimming. You can also fit in more physical activity by taking the stairs instead of the elevator or parking farther away from stores. Ask your healthcare provider about the best exercise plan for you.      © Copyright [employer] 2018 Information is for End User's use only and may not be sold, redistributed or otherwise used for commercial purposes. All illustrations and images included in CareNotes® are the copyrighted property of [employer] or [employer]

## 2024-08-07 NOTE — ASSESSMENT & PLAN NOTE
[facility] labs, did improve on last check, BP now controlled.    Lab Results   Component Value Date    EGFR 59 03/12/2024    EGFR 47 07/27/2023    EGFR 79 04/24/2023    CREATININE 0.99 03/12/2024    CREATININE 1.20 07/27/2023    CREATININE 0.82 04/24/2023

## 2024-08-16 ENCOUNTER — VBI (OUTPATIENT)
Dept: ADMINISTRATIVE | Facility: OTHER | Age: 69
End: 2024-08-16

## 2024-08-16 NOTE — TELEPHONE ENCOUNTER
08/16/24 10:05 AM     Chart reviewed for Hemoglobin A1c ; nothing is submitted to the patient's insurance at this time.     Elli Carter MA   [facility]

## 2024-09-12 DIAGNOSIS — I10 ESSENTIAL HYPERTENSION: ICD-10-CM

## 2024-09-12 RX ORDER — AMLODIPINE BESYLATE 2.5 MG/1
2.5 TABLET ORAL DAILY
Qty: 90 TABLET | Refills: 1 | Status: SHIPPED | OUTPATIENT
Start: 2024-09-12

## 2024-11-08 ENCOUNTER — OFFICE VISIT (OUTPATIENT)
Dept: FAMILY MEDICINE CLINIC | Facility: CLINIC | Age: 69
End: 2024-11-08
Payer: COMMERCIAL

## 2024-11-08 VITALS
BODY MASS INDEX: 27.73 KG/M2 | RESPIRATION RATE: 16 BRPM | OXYGEN SATURATION: 99 % | DIASTOLIC BLOOD PRESSURE: 80 MMHG | WEIGHT: 187.2 LBS | HEIGHT: 69 IN | SYSTOLIC BLOOD PRESSURE: 160 MMHG | HEART RATE: 86 BPM

## 2024-11-08 DIAGNOSIS — E03.9 ACQUIRED HYPOTHYROIDISM: ICD-10-CM

## 2024-11-08 DIAGNOSIS — Z12.11 COLON CANCER SCREENING: Primary | ICD-10-CM

## 2024-11-08 DIAGNOSIS — Z12.31 ENCOUNTER FOR SCREENING MAMMOGRAM FOR BREAST CANCER: ICD-10-CM

## 2024-11-08 DIAGNOSIS — E78.2 MIXED HYPERLIPIDEMIA: ICD-10-CM

## 2024-11-08 DIAGNOSIS — M1A.39X0 CHRONIC GOUT DUE TO RENAL IMPAIRMENT OF MULTIPLE SITES WITHOUT TOPHUS: ICD-10-CM

## 2024-11-08 DIAGNOSIS — F17.210 SMOKING GREATER THAN 20 PACK YEARS: ICD-10-CM

## 2024-11-08 DIAGNOSIS — J45.20 MILD INTERMITTENT ASTHMA WITHOUT COMPLICATION: ICD-10-CM

## 2024-11-08 DIAGNOSIS — M19.049 CMC ARTHRITIS: ICD-10-CM

## 2024-11-08 DIAGNOSIS — Z23 ENCOUNTER FOR IMMUNIZATION: ICD-10-CM

## 2024-11-08 DIAGNOSIS — N18.32 STAGE 3B CHRONIC KIDNEY DISEASE (HCC): ICD-10-CM

## 2024-11-08 DIAGNOSIS — I10 ESSENTIAL HYPERTENSION: ICD-10-CM

## 2024-11-08 PROCEDURE — 90662 IIV NO PRSV INCREASED AG IM: CPT | Performed by: FAMILY MEDICINE

## 2024-11-08 PROCEDURE — G0008 ADMIN INFLUENZA VIRUS VAC: HCPCS | Performed by: FAMILY MEDICINE

## 2024-11-08 PROCEDURE — 99214 OFFICE O/P EST MOD 30 MIN: CPT | Performed by: FAMILY MEDICINE

## 2024-11-08 RX ORDER — ALBUTEROL SULFATE 90 UG/1
INHALANT RESPIRATORY (INHALATION)
Qty: 9 G | Refills: 0 | Status: CANCELLED | OUTPATIENT
Start: 2024-11-08

## 2024-11-08 RX ORDER — COLCHICINE 0.6 MG/1
1 CAPSULE ORAL 2 TIMES DAILY
Qty: 3 CAPSULE | Refills: 1 | Status: CANCELLED | OUTPATIENT
Start: 2024-11-08

## 2024-11-08 RX ORDER — LOSARTAN POTASSIUM 50 MG/1
50 TABLET ORAL DAILY
Qty: 30 TABLET | Refills: 5 | Status: SHIPPED | OUTPATIENT
Start: 2024-11-08

## 2024-11-08 RX ORDER — HYDRALAZINE HYDROCHLORIDE 100 MG/1
100 TABLET, FILM COATED ORAL 3 TIMES DAILY
Qty: 270 TABLET | Refills: 1 | Status: SHIPPED | OUTPATIENT
Start: 2024-11-08

## 2024-11-08 RX ORDER — ALBUTEROL SULFATE 90 UG/1
2 INHALANT RESPIRATORY (INHALATION) EVERY 6 HOURS PRN
Qty: 8.5 G | Refills: 0 | Status: SHIPPED | OUTPATIENT
Start: 2024-11-08

## 2024-11-08 NOTE — PROGRESS NOTES
Ambulatory Visit  Name: Isa Mcmahon      : 1955      MRN: [id number]  Encounter Provider: Dinh Granger MD  Encounter Date: 2024   Encounter department: [facility]    Assessment & Plan  Encounter for screening mammogram for breast cancer    Orders:    Mammo screening bilateral w 3d and cad; Future    Smoking greater than 20 pack years  Discussed cessation, CT screening ordered   Orders:    CT lung screening program; Future    Mild intermittent asthma without complication    Orders:    albuterol (ProAir HFA) 90 mcg/act inhaler; Inhale 2 puffs every 6 (six) hours as needed for wheezing    Chronic gout 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 tophus    Uric acid test pending, no recent flairs       Essential hypertension  Uncontrolled had stopped hydralazine, restart reports no side effects, patient will call in with BP in one week  Orders:    hydrALAZINE (APRESOLINE) 100 MG tablet; Take 1 tablet (100 mg total) by mouth 3 (three) times a day    Acquired hypothyroidism    Recheck TSH and dose accordingly       Stage 3b chronic kidney disease (HCC)  Lab Results   Component Value Date    EGFR 59 2024    EGFR 47 2023    EGFR 79 2023    CREATININE 0.99 2024    CREATININE 1.20 2023    CREATININE 0.82 2023     Pending BMP  Orders:    losartan (COZAAR) 50 mg tablet; Take 1 tablet (50 mg total) by mouth daily    Encounter for immunization    Orders:    influenza vaccine, high-dose, PF 0.5 mL (Fluzone High Dose)    Mixed hyperlipidemia  Recheck labs       CMC arthritis  Long term pain espeically as a , check xray refer to orthopedcis  Orders:    XR hand 3+ vw right; Future    Ambulatory Referral to Orthopedic Surgery; Future    Colon cancer screening    Orders:    Ambulatory Referral to Gastroenterology; Future       History of Present Illness     HPI    69 year old presnting in follow up and for chronic hand pain worse  "on right.    No recent injury.    Recently ran out of hydralazine reports went to pharmacy and no script avalaible.      Review of Systems   Constitutional:  Negative for activity change and appetite change.   Respiratory:  Negative for apnea and chest tightness.    Cardiovascular:  Negative for chest pain and palpitations.   Gastrointestinal:  Negative for abdominal distention and abdominal pain.   Musculoskeletal:  Negative for arthralgias and back pain.   Neurological:  Negative for dizziness and facial asymmetry.           Objective     /80 (BP Location: Left arm)   Pulse 86   Resp 16   Ht 5' 9\" (1.753 m)   Wt 84.9 kg (187 lb 3.2 oz)   SpO2 99%   BMI 27.64 kg/m²     Physical Exam  Constitutional:       Appearance: Normal appearance.   Cardiovascular:      Rate and Rhythm: Normal rate and regular rhythm.      Pulses: Normal pulses.      Heart sounds: Normal heart sounds.   Pulmonary:      Effort: Pulmonary effort is normal.      Breath sounds: Normal breath sounds.   Musculoskeletal:      Right hand: Tenderness present.      Left hand: Normal.      Comments: Tender at base of thumb   Neurological:      General: No focal deficit present.      Mental Status: She is alert and oriented to person, place, and time.         "

## 2024-11-08 NOTE — ASSESSMENT & PLAN NOTE
Lab Results   Component Value Date    EGFR 59 03/12/2024    EGFR 47 07/27/2023    EGFR 79 04/24/2023    CREATININE 0.99 03/12/2024    CREATININE 1.20 07/27/2023    CREATININE 0.82 04/24/2023     Pending BMP  Orders:    losartan (COZAAR) 50 mg tablet; Take 1 tablet (50 mg total) by mouth daily

## 2024-11-08 NOTE — ASSESSMENT & PLAN NOTE
Uncontrolled had stopped hydralazine, restart reports no side effects, patient will call in with BP in one week  Orders:    hydrALAZINE (APRESOLINE) 100 MG tablet; Take 1 tablet (100 mg total) by mouth 3 (three) times a day

## 2024-11-18 ENCOUNTER — APPOINTMENT (OUTPATIENT)
Dept: LAB | Facility: MEDICAL CENTER | Age: 69
End: 2024-11-18
Payer: COMMERCIAL

## 2024-11-18 ENCOUNTER — APPOINTMENT (OUTPATIENT)
Dept: RADIOLOGY | Facility: MEDICAL CENTER | Age: 69
End: 2024-11-18
Payer: COMMERCIAL

## 2024-11-18 DIAGNOSIS — R74.01 TRANSAMINITIS: ICD-10-CM

## 2024-11-18 DIAGNOSIS — M19.049 CMC ARTHRITIS: ICD-10-CM

## 2024-11-18 DIAGNOSIS — E03.9 ACQUIRED HYPOTHYROIDISM: ICD-10-CM

## 2024-11-18 DIAGNOSIS — E78.5 HYPERLIPIDEMIA, UNSPECIFIED HYPERLIPIDEMIA TYPE: ICD-10-CM

## 2024-11-18 DIAGNOSIS — M1A.39X0 CHRONIC GOUT DUE TO RENAL IMPAIRMENT OF MULTIPLE SITES WITHOUT TOPHUS: ICD-10-CM

## 2024-11-18 LAB
ALBUMIN SERPL BCG-MCNC: 4 G/DL (ref 3.5–5)
ALP SERPL-CCNC: 149 U/L (ref 34–104)
ALT SERPL W P-5'-P-CCNC: 19 U/L (ref 7–52)
ANION GAP SERPL CALCULATED.3IONS-SCNC: 8 MMOL/L (ref 4–13)
AST SERPL W P-5'-P-CCNC: 26 U/L (ref 13–39)
BILIRUB SERPL-MCNC: 0.37 MG/DL (ref 0.2–1)
BUN SERPL-MCNC: 24 MG/DL (ref 5–25)
CALCIUM SERPL-MCNC: 8.6 MG/DL (ref 8.4–10.2)
CHLORIDE SERPL-SCNC: 104 MMOL/L (ref 96–108)
CHOLEST SERPL-MCNC: 157 MG/DL (ref ?–200)
CO2 SERPL-SCNC: 29 MMOL/L (ref 21–32)
CREAT SERPL-MCNC: 1.13 MG/DL (ref 0.6–1.3)
GFR SERPL CREATININE-BSD FRML MDRD: 50 ML/MIN/1.73SQ M
GLUCOSE P FAST SERPL-MCNC: 134 MG/DL (ref 65–99)
HDLC SERPL-MCNC: 73 MG/DL
LDLC SERPL CALC-MCNC: 70 MG/DL (ref 0–100)
NONHDLC SERPL-MCNC: 84 MG/DL
POTASSIUM SERPL-SCNC: 4.5 MMOL/L (ref 3.5–5.3)
PROT SERPL-MCNC: 6.9 G/DL (ref 6.4–8.4)
SODIUM SERPL-SCNC: 141 MMOL/L (ref 135–147)
TRIGL SERPL-MCNC: 71 MG/DL (ref ?–150)
TSH SERPL DL<=0.05 MIU/L-ACNC: 3.77 UIU/ML (ref 0.45–4.5)
URATE SERPL-MCNC: 8.5 MG/DL (ref 2–7.5)

## 2024-11-18 PROCEDURE — 84550 ASSAY OF BLOOD/URIC ACID: CPT

## 2024-11-18 PROCEDURE — 80053 COMPREHEN METABOLIC PANEL: CPT

## 2024-11-18 PROCEDURE — 73130 X-RAY EXAM OF HAND: CPT

## 2024-11-18 PROCEDURE — 36415 COLL VENOUS BLD VENIPUNCTURE: CPT

## 2024-11-18 PROCEDURE — 80061 LIPID PANEL: CPT

## 2024-11-18 PROCEDURE — 84443 ASSAY THYROID STIM HORMONE: CPT

## 2024-11-19 ENCOUNTER — RESULTS FOLLOW-UP (OUTPATIENT)
Dept: FAMILY MEDICINE CLINIC | Facility: CLINIC | Age: 69
End: 2024-11-19

## 2024-12-10 ENCOUNTER — VBI (OUTPATIENT)
Dept: ADMINISTRATIVE | Facility: OTHER | Age: 69
End: 2024-12-10

## 2024-12-10 NOTE — TELEPHONE ENCOUNTER
12/10/24 11:38 AM     Chart reviewed for CRC: Colonoscopy ; nothing is submitted to the patient's insurance at this time.     Elli Carter MA   [facility]

## 2024-12-11 ENCOUNTER — VBI (OUTPATIENT)
Dept: ADMINISTRATIVE | Facility: OTHER | Age: 69
End: 2024-12-11

## 2024-12-11 NOTE — TELEPHONE ENCOUNTER
12/11/24 8:19 AM     Chart reviewed for CRC: Colonoscopy ; nothing is submitted to the patient's insurance at this time.     Elli Carter MA   [facility]

## 2025-02-07 DIAGNOSIS — I10 ESSENTIAL HYPERTENSION: ICD-10-CM

## 2025-02-07 DIAGNOSIS — G60.9 IDIOPATHIC PERIPHERAL NEUROPATHY: ICD-10-CM

## 2025-02-07 DIAGNOSIS — I82.462 ACUTE DEEP VEIN THROMBOSIS (DVT) OF CALF MUSCLE VEIN OF LEFT LOWER EXTREMITY (HCC): ICD-10-CM

## 2025-02-07 DIAGNOSIS — E03.9 ACQUIRED HYPOTHYROIDISM: ICD-10-CM

## 2025-02-07 DIAGNOSIS — J45.20 MILD INTERMITTENT ASTHMA WITHOUT COMPLICATION: ICD-10-CM

## 2025-02-07 DIAGNOSIS — F51.05 INSOMNIA DUE TO MENTAL CONDITION: ICD-10-CM

## 2025-02-07 DIAGNOSIS — E78.2 MIXED HYPERLIPIDEMIA: ICD-10-CM

## 2025-02-07 RX ORDER — HYDROXYZINE HYDROCHLORIDE 25 MG/1
25 TABLET, FILM COATED ORAL
Qty: 90 TABLET | Refills: 1 | Status: SHIPPED | OUTPATIENT
Start: 2025-02-07

## 2025-02-07 RX ORDER — FUROSEMIDE 40 MG/1
40 TABLET ORAL DAILY
Qty: 90 TABLET | Refills: 1 | Status: SHIPPED | OUTPATIENT
Start: 2025-02-07

## 2025-02-07 RX ORDER — ATORVASTATIN CALCIUM 40 MG/1
40 TABLET, FILM COATED ORAL
Qty: 90 TABLET | Refills: 1 | Status: SHIPPED | OUTPATIENT
Start: 2025-02-07

## 2025-02-07 RX ORDER — DULOXETIN HYDROCHLORIDE 30 MG/1
30 CAPSULE, DELAYED RELEASE ORAL DAILY
Qty: 90 CAPSULE | Refills: 1 | Status: SHIPPED | OUTPATIENT
Start: 2025-02-07

## 2025-02-07 RX ORDER — LEVOTHYROXINE SODIUM 137 UG/1
137 TABLET ORAL EVERY MORNING
Qty: 90 TABLET | Refills: 1 | Status: SHIPPED | OUTPATIENT
Start: 2025-02-07

## 2025-02-07 RX ORDER — ALBUTEROL SULFATE 90 UG/1
2 INHALANT RESPIRATORY (INHALATION) EVERY 6 HOURS PRN
Qty: 8.5 G | Refills: 2 | Status: SHIPPED | OUTPATIENT
Start: 2025-02-07

## 2025-02-07 NOTE — TELEPHONE ENCOUNTER
Reason for call:   [x] Refill   [] Prior Auth  [] Other:     Office:   [x] PCP/Provider - Dinh Granger   [] Specialty/Provider -     Medication:     albuterol (ProAir HFA) 90 mcg/act inhale   atorvastatin (LIPITOR) 40 mg   DULoxetine (Cymbalta) 30 mg   furosemide (LASIX) 40 mg   hydrOXYzine HCL (ATARAX) 25 mg   levothyroxine 137 mcg   rivaroxaban (Xarelto) 20 mg     Pharmacy: [facility]    Does the patient have enough for 3 days?   [] Yes   [x] No - Send as HP to POD

## 2025-02-10 ENCOUNTER — OFFICE VISIT (OUTPATIENT)
Dept: FAMILY MEDICINE CLINIC | Facility: CLINIC | Age: 70
End: 2025-02-10
Payer: COMMERCIAL

## 2025-02-10 ENCOUNTER — TELEPHONE (OUTPATIENT)
Dept: FAMILY MEDICINE CLINIC | Facility: CLINIC | Age: 70
End: 2025-02-10

## 2025-02-10 VITALS
DIASTOLIC BLOOD PRESSURE: 84 MMHG | TEMPERATURE: 97.3 F | BODY MASS INDEX: 28.91 KG/M2 | HEIGHT: 69 IN | SYSTOLIC BLOOD PRESSURE: 130 MMHG | WEIGHT: 195.2 LBS | RESPIRATION RATE: 18 BRPM | HEART RATE: 88 BPM | OXYGEN SATURATION: 98 %

## 2025-02-10 DIAGNOSIS — B86 SCABIES: ICD-10-CM

## 2025-02-10 DIAGNOSIS — N18.31 STAGE 3A CHRONIC KIDNEY DISEASE (HCC): ICD-10-CM

## 2025-02-10 DIAGNOSIS — L20.82 FLEXURAL ECZEMA: ICD-10-CM

## 2025-02-10 DIAGNOSIS — R73.03 PREDIABETES: ICD-10-CM

## 2025-02-10 DIAGNOSIS — I82.409 RECURRENT DEEP VEIN THROMBOSIS (DVT) (HCC): Primary | ICD-10-CM

## 2025-02-10 DIAGNOSIS — E03.9 ACQUIRED HYPOTHYROIDISM: ICD-10-CM

## 2025-02-10 DIAGNOSIS — I10 ESSENTIAL HYPERTENSION: ICD-10-CM

## 2025-02-10 PROCEDURE — 99214 OFFICE O/P EST MOD 30 MIN: CPT | Performed by: FAMILY MEDICINE

## 2025-02-10 PROCEDURE — G2211 COMPLEX E/M VISIT ADD ON: HCPCS | Performed by: FAMILY MEDICINE

## 2025-02-10 RX ORDER — PERMETHRIN 50 MG/G
CREAM TOPICAL ONCE
Qty: 60 G | Refills: 1 | Status: SHIPPED | OUTPATIENT
Start: 2025-02-10 | End: 2025-02-10

## 2025-02-10 RX ORDER — TRIAMCINOLONE ACETONIDE 1 MG/G
CREAM TOPICAL 2 TIMES DAILY
Qty: 80 G | Refills: 2 | Status: SHIPPED | OUTPATIENT
Start: 2025-02-10

## 2025-02-10 NOTE — ASSESSMENT & PLAN NOTE
Lab Results   Component Value Date    EGFR 50 11/18/2024    EGFR 59 03/12/2024    EGFR 47 07/27/2023    CREATININE 1.13 11/18/2024    CREATININE 0.99 03/12/2024    CREATININE 1.20 07/27/2023

## 2025-02-10 NOTE — ASSESSMENT & PLAN NOTE
Controlled on current regimen,    Orders:    Comprehensive metabolic panel; Future    Lipid panel; Future

## 2025-02-10 NOTE — PROGRESS NOTES
Name: Isa Mcamhon      : 1955      MRN: [id number]  Encounter Provider: Dinh Granger MD  Encounter Date: 2/10/2025   Encounter department: [facility]  :  Assessment & Plan  Recurrent deep vein thrombosis (DVT) (HCC)    Was on xarelto for 5 yeasrs with no recurrence, per patient had DVT x 2 and was told she needed it for lifetime, currently on aspirin, xarelto not affordable did reach out to pharmacy and is covered but copapy is $600, patient does want to use warfarin did use in past prior to xarelto.    Orders:    Ambulatory referral to clinical pharmacy; Future    Stage 3a chronic kidney disease (HCC)  Lab Results   Component Value Date    EGFR 50 2024    EGFR 59 2024    EGFR 47 2023    CREATININE 1.13 2024    CREATININE 0.99 2024    CREATININE 1.20 2023            Acquired hypothyroidism  Recehck   Orders:    TSH, 3rd generation with Free T4 reflex; Future    Essential hypertension  Controlled on current regimen,    Orders:    Comprehensive metabolic panel; Future    Lipid panel; Future    Prediabetes    Orders:    Hemoglobin A1C; Future    Flexural eczema  Rash may be stress reaction, concerning for scabies worse in hand in between fingers and on legs. Treat with kenalog and will give permetherin in case of scabies.    Orders:    triamcinolone (KENALOG) 0.1 % cream; Apply topically 2 (two) times a day    Scabies    Orders:    permethrin (ELIMITE) 5 % cream; Apply topically once for 1 dose Repeat in two weeks if not resolved, applu second dose, apply from neck down          Depression Screening and Follow-up Plan: Patient's depression screening was positive with a PHQ-9 score of 7.   Depression likely due to other medical condition. Will treat underlying condition.   Tobacco Cessation Counseling: Tobacco cessation counseling was provided. The patient is sincerely urged to quit consumption of tobacco. She is ready to quit  "tobacco. Medication options and side effects of medication discussed. Patient refused medication.     Pending scrreing tests, colonscopy, mammogram, CT lung, discussed. Declines all at this time  History of Present Illness   Rash  Pertinent negatives include no cough, fever, shortness of breath, sore throat or vomiting.       69 year old presenting in follow up, BP controlled improved since last visit.    Pending scrreing tests, colonscopy, mammogram, CT lung, discussed.    Itchy rash, for the past week, n onew medications, no new detergents, described as very itchy.    BP at home 133/72 this AM    Worried it is stress reaction,  sick with alcoholism     Review of Systems   Constitutional:  Negative for chills and fever.   HENT:  Negative for ear pain and sore throat.    Eyes:  Negative for pain and visual disturbance.   Respiratory:  Negative for cough and shortness of breath.    Cardiovascular:  Negative for chest pain and palpitations.   Gastrointestinal:  Negative for abdominal pain and vomiting.   Genitourinary:  Negative for dysuria and hematuria.   Musculoskeletal:  Negative for arthralgias and back pain.   Skin:  Positive for rash. Negative for color change.   Neurological:  Negative for seizures and syncope.   All other systems reviewed and are negative.      Objective   /80 (BP Location: Right arm, Patient Position: Sitting, Cuff Size: Large)   Pulse 88   Temp (!) 97.3 °F (36.3 °C) (Tympanic)   Resp 18   Ht 5' 9\" (1.753 m) Comment: From past visit  Wt 88.5 kg (195 lb 3.2 oz) Comment: With shoes and coat  SpO2 98%   BMI 28.83 kg/m²      Physical Exam  Cardiovascular:      Rate and Rhythm: Normal rate.   Pulmonary:      Effort: Pulmonary effort is normal.      Breath sounds: Normal breath sounds.   Abdominal:      General: Abdomen is flat.   Skin:     General: Skin is warm.      Comments: Pruritic erythemetous rash, worse on hands and in web of fingers    Neurological:      General: No " focal deficit present.      Mental Status: She is alert.

## 2025-02-10 NOTE — ASSESSMENT & PLAN NOTE
Was on xarelto for 5 yeasrs with no recurrence, per patient had DVT x 2 and was told she needed it for lifetime, currently on aspirin, xarelto not affordable did reach out to pharmacy and is covered but copapy is $600, patient does want to use warfarin did use in past prior to xarelto.    Orders:    Ambulatory referral to clinical pharmacy; Future

## 2025-02-11 NOTE — TELEPHONE ENCOUNTER
[facility]  Rowan Shaikh PharmD     Communication with patient: chart review     Reason for documentation: referred to the clinical pharmacist for medication cost    Recommendations:     Patient's high out-of-pocket cost for Xarelto is due to unmet prescription drug insurance deductible.    She is responsible for 100% of drug costs (for branded meds) until deductible is satisfied. She can opt for 30-day supply vs. 90-day supply of medication.    After deductible is met, Xarelto co-pay is anticipated to be $47.     If patient has additional financial concerns, consider referral to social work for support.     Medication Access Issue:       Reported cost: $600 for 3 months     Prescription drug plan: Medicare Advantage     Patient has $420 deductible, which has not yet be met.     Clinical Concern:      1. Unprovoked right distal lower extremity DVT in 2003. Treated with warfarin x 6 months, then     2. Unprovoked left distal lower extremity DVT, diagnosed in July 2020. Transitioned to Xarelto 20 mg daily    Per Hematology: indefinite anticoagulation since she had 2 episodes of unprovoked DVT    Pharmacist Tracking Tool:     Reason For Outreach: Embedded Pharmacist  Demographics:  Intervention Method: Chart Review  Type of Intervention: New  Topics Addressed: Anticoagulation  Pharmacologic Interventions: N/A  Non-Pharmacologic Interventions: Cost  Time:  Direct Patient Care:  0  mins  Care Coordination:  20  mins  Recommendation Recipient: Provider  Outcome: N/A

## 2025-02-20 ENCOUNTER — TELEPHONE (OUTPATIENT)
Dept: FAMILY MEDICINE CLINIC | Facility: CLINIC | Age: 70
End: 2025-02-20

## 2025-02-20 DIAGNOSIS — L29.9 PRURITUS: Primary | ICD-10-CM

## 2025-02-20 RX ORDER — METHYLPREDNISOLONE 4 MG/1
TABLET ORAL
Qty: 21 EACH | Refills: 0 | Status: SHIPPED | OUTPATIENT
Start: 2025-02-20

## 2025-02-20 NOTE — TELEPHONE ENCOUNTER
Rash is now on neck, arms, legs, and sides.  She stated the cream you had prescribed her in not working and is asking for something else.

## 2025-02-25 NOTE — TELEPHONE ENCOUNTER
Called pt to let her know that Dr. Granger sent a prescription to her pharmacy for the rash. Pt stated she picked it up, has been using it and it is helping.

## 2025-03-07 DIAGNOSIS — I10 ESSENTIAL HYPERTENSION: ICD-10-CM

## 2025-03-07 RX ORDER — AMLODIPINE BESYLATE 2.5 MG/1
2.5 TABLET ORAL DAILY
Qty: 90 TABLET | Refills: 0 | Status: SHIPPED | OUTPATIENT
Start: 2025-03-07

## 2025-03-12 DIAGNOSIS — L29.9 PRURITUS: ICD-10-CM

## 2025-03-13 RX ORDER — METHYLPREDNISOLONE 4 MG/1
TABLET ORAL
Qty: 21 TABLET | Refills: 0 | OUTPATIENT
Start: 2025-03-13

## 2025-03-18 ENCOUNTER — TELEPHONE (OUTPATIENT)
Dept: GASTROENTEROLOGY | Facility: CLINIC | Age: 70
End: 2025-03-18

## 2025-03-18 NOTE — TELEPHONE ENCOUNTER
Our mutual patient is scheduled for procedure: COLONOSCOPY    On: 04/09/25      With: Dr. GARCÍA    He/She is taking the following blood thinner:   XARELTO       Can this be stopped __2____ days prior to the procedure?      Physician Approving clearance: ________________________

## 2025-03-18 NOTE — TELEPHONE ENCOUNTER
Scheduled date of colonoscopy (as of today):04.09.25  Physician performing colonoscopy:DR GARCÍA  Location of colonoscopy[facility]  Bowel prep reviewed with patient:DUL/RUPAL  Instructions reviewed with patient by:MAILED  Clearances: N/A    03/18/25  Screened by: Ryne Urban MA    Referring Provider JUAN CARLOS ROME    Pre- Screening:     There is no height or weight on file to calculate BMI.  Has patient been referred for a routine screening Colonoscopy? yes  Is the patient between 45-75 years old? yes      Previous Colonoscopy yes   If yes:    Date:     Facility:     Reason:       SCHEDULING STAFF: If the patient is between 45yrs-49yrs, please advise patient to confirm benefits/coverage with their insurance company for a routine screening colonoscopy, some insurance carriers will only cover at 50yrs or older. If the patient is over 75years old, please schedule an office visit.     Does the patient want to see a Gastroenterologist prior to their procedure OR are they having any GI symptoms? no    Has the patient been hospitalized or had abdominal surgery in the past 6 months? no    Does the patient use supplemental oxygen? no    Does the patient take Coumadin, Lovenox, Plavix, Elliquis, Xarelto, or other blood thinning medication? no    Has the patient had a stroke, cardiac event, or stent placed in the past year? no    SCHEDULING STAFF: If patient answers NO to above questions, then schedule procedure. If patient answers YES to above questions, then schedule office appointment.     If patient is between 45yrs - 49yrs, please advise patient that we will have to confirm benefits & coverage with their insurance company for a routine screening colonoscopy.

## 2025-03-19 ENCOUNTER — PATIENT MESSAGE (OUTPATIENT)
Dept: GASTROENTEROLOGY | Facility: CLINIC | Age: 70
End: 2025-03-19

## 2025-03-21 ENCOUNTER — HOSPITAL ENCOUNTER (EMERGENCY)
Facility: HOSPITAL | Age: 70
Discharge: HOME | End: 2025-03-21
Attending: INTERNAL MEDICINE | Admitting: EMERGENCY MEDICINE
Payer: COMMERCIAL

## 2025-03-21 VITALS
DIASTOLIC BLOOD PRESSURE: 93 MMHG | RESPIRATION RATE: 18 BRPM | HEART RATE: 93 BPM | BODY MASS INDEX: 29.3 KG/M2 | WEIGHT: 198.41 LBS | OXYGEN SATURATION: 97 % | SYSTOLIC BLOOD PRESSURE: 197 MMHG | TEMPERATURE: 98.2 F

## 2025-03-21 DIAGNOSIS — R21 RASH: Primary | ICD-10-CM

## 2025-03-21 PROCEDURE — 99284 EMERGENCY DEPT VISIT MOD MDM: CPT | Performed by: PHYSICIAN ASSISTANT

## 2025-03-21 PROCEDURE — 99283 EMERGENCY DEPT VISIT LOW MDM: CPT

## 2025-03-21 RX ORDER — TRIAMCINOLONE ACETONIDE 1 MG/G
CREAM TOPICAL 2 TIMES DAILY
Qty: 453.6 G | Refills: 0 | Status: SHIPPED | OUTPATIENT
Start: 2025-03-21

## 2025-03-22 NOTE — DISCHARGE INSTRUCTIONS
Please refer to the attached information for strict return instructions. If symptoms worsen or new symptoms develop please return to the ER. Please follow up with dermatology for re-evaluation.

## 2025-03-22 NOTE — ED PROVIDER NOTES
Time reflects when diagnosis was documented in both MDM as applicable and the Disposition within this note       Time User Action Codes Description Comment    3/21/2025  9:38 PM Rosas Bear Add [R21] Rash           ED Disposition       ED Disposition   Discharge    Condition   Stable    Date/Time   Fri Mar 21, 2025  9:38 PM    Comment   Isa Mcmahon discharge to home/self care.                   Assessment & Plan       Medical Decision Making  Rash over the past weeks-months, temporarily relieved by medrol. No specific allergen/trigger noted. She has diffuse, eczematous appearing patches to b/l upper/lower extremities, torso. See accompanying media. Discussed with Dr. Bennett, on call derm - recommends initiation of topical triamcinolone 0.1% cream and f/u with derm in office, pt agreeable. Referral placed. Return indications reviewed.     Risk  Prescription drug management.             Medications - No data to display    ED Risk Strat Scores                                                History of Present Illness       Chief Complaint   Patient presents with    Rash     States generalized rash since feb. States seen at PCP and told it was scabies. Given meds states rash did not get better. States then given steroids but still has no relief.        Past Medical History:   Diagnosis Date    Acute renal failure (ARF) (HCC)     Asthma     Atopic dermatitis     Chronic kidney disease     Stage 3    Colon polyps     Diabetes mellitus (HCC)     type 2    Disease of thyroid gland     hypothyroidism    Diverticulosis     Edema of both legs     Gout     Hemorrhoids     Hypertension     Nephrosclerosis     Peripheral neuropathy     Phlebitis     Venous stasis dermatitis of both lower extremities     Wrist joint pain     LEFT      Past Surgical History:   Procedure Laterality Date    CERVIX SURGERY      COLONOSCOPY N/A 5/31/2016    Procedure: COLONOSCOPY;  Surgeon: Barbara Wayne MD;  Location: [facility];  Service:      VEIN SURGERY        Family History   Problem Relation Age of Onset    Diabetes Mother         Type 2 as per allscripts    Heart attack Father     Diabetes type II Sister     Hypertension Other       Social History     Tobacco Use    Smoking status: Every Day     Current packs/day: 0.50     Average packs/day: 0.5 packs/day for 48.2 years (24.1 ttl pk-yrs)     Types: Cigarettes     Start date: 1977    Smokeless tobacco: Never    Tobacco comments:     4-5 cigarettes a day   Vaping Use    Vaping status: Never Used   Substance Use Topics    Alcohol use: Yes     Comment: social    Drug use: No      E-Cigarette/Vaping    E-Cigarette Use Never User       E-Cigarette/Vaping Substances    Nicotine No     THC No     CBD No     Flavoring No     Other No     Unknown No       I have reviewed and agree with the history as documented.     Isa is a 70 yo F presenting with rash over the past weeks-months. It began on hands/forearms and now involves torso and thighs as well. It is very pruritic overall. Denies significant history of similar prior to this episode. Was placed on medrol dosepak which calmed her symptoms however returned following and have increased. She also had used topical triamcinolone although only mild improvement with noted. No systemic symptoms otherwise. She is unable to think of any new allergens.      History provided by:  Patient      Review of Systems   Constitutional:  Negative for chills and fever.   HENT:  Negative for congestion, rhinorrhea and sore throat.    Eyes:  Negative for pain and visual disturbance.   Respiratory:  Negative for cough, shortness of breath and wheezing.    Cardiovascular:  Negative for chest pain and palpitations.   Gastrointestinal:  Negative for abdominal pain, nausea and vomiting.   Genitourinary:  Negative for dysuria, frequency and urgency.   Musculoskeletal:  Negative for back pain, neck pain and neck stiffness.   Skin:  Positive for rash. Negative for wound.    Neurological:  Negative for dizziness, weakness, light-headedness and numbness.           Objective       ED Triage Vitals [03/21/25 2007]   Temperature Pulse Blood Pressure Respirations SpO2 Patient Position - Orthostatic VS   98.2 °F (36.8 °C) 93 (!) 197/93 18 97 % Sitting      Temp Source Heart Rate Source BP Location FiO2 (%) Pain Score    Oral Monitor Right arm -- 10 - Worst Possible Pain      Vitals      Date and Time Temp Pulse SpO2 Resp BP Pain Score FACES Pain Rating User   03/21/25 2007 98.2 °F (36.8 °C) 93 97 % 18 197/93 10 - Worst Possible Pain -- RC            Physical Exam  Constitutional:       General: She is not in acute distress.     Appearance: She is well-developed. She is not diaphoretic.   HENT:      Head: Normocephalic and atraumatic.      Comments: No mucus membrane lesions noted     Right Ear: External ear normal.      Left Ear: External ear normal.   Eyes:      Extraocular Movements:      Right eye: Normal extraocular motion.      Left eye: Normal extraocular motion.      Conjunctiva/sclera: Conjunctivae normal.      Pupils: Pupils are equal, round, and reactive to light.   Cardiovascular:      Rate and Rhythm: Normal rate and regular rhythm.   Pulmonary:      Effort: Pulmonary effort is normal. No accessory muscle usage or respiratory distress.   Abdominal:      General: Abdomen is flat. There is no distension.   Musculoskeletal:      Cervical back: Normal range of motion. No rigidity.   Skin:     General: Skin is warm and dry.      Capillary Refill: Capillary refill takes less than 2 seconds.      Findings: Rash present. No erythema.      Comments: Diffuse, eczematous rash to b/l hands/forearms, torso, abdomen, thighs. Raised, rough feeling coalescing patches. See accompanying media   Neurological:      Mental Status: She is alert and oriented to person, place, and time.      Motor: No abnormal muscle tone.      Coordination: Coordination normal.   Psychiatric:         Behavior:  Behavior normal.         Thought Content: Thought content normal.         Judgment: Judgment normal.         Results Reviewed       None            No orders to display       Procedures    ED Medication and Procedure Management   Prior to Admission Medications   Prescriptions Last Dose Informant Patient Reported? Taking?   DULoxetine (Cymbalta) 30 mg delayed release capsule   No No   Sig: Take 1 capsule (30 mg total) by mouth daily   acetaminophen (TYLENOL) 500 mg tablet  Self Yes No   Sig: Take 500 mg by mouth every 4 (four) hours as needed   albuterol (PROVENTIL HFA,VENTOLIN HFA) 90 mcg/act inhaler  Self No No   Sig: INHALE 2 PUFFS BY MOUTH EVERY 6 HOURS AS NEEDED FOR WHEEZING OR SHORTNESS OF BREATH   albuterol (ProAir HFA) 90 mcg/act inhaler   No No   Sig: Inhale 2 puffs every 6 (six) hours as needed for wheezing   amLODIPine (NORVASC) 2.5 mg tablet   No No   Sig: Take 1 tablet by mouth once daily   atorvastatin (LIPITOR) 40 mg tablet   No No   Sig: Take 1 tablet (40 mg total) by mouth daily with dinner   furosemide (LASIX) 40 mg tablet   No No   Sig: Take 1 tablet (40 mg total) by mouth daily   hydrALAZINE (APRESOLINE) 100 MG tablet   No No   Sig: Take 1 tablet (100 mg total) by mouth 3 (three) times a day   hydrOXYzine HCL (ATARAX) 25 mg tablet   No No   Sig: Take 1 tablet (25 mg total) by mouth daily at bedtime   levothyroxine 137 mcg tablet   No No   Sig: Take 1 tablet (137 mcg total) by mouth every morning   losartan (COZAAR) 50 mg tablet   No No   Sig: Take 1 tablet (50 mg total) by mouth daily   methylPREDNISolone 4 MG tablet therapy pack   No No   Sig: Use as directed on package   rivaroxaban (Xarelto) 20 mg tablet   No No   Sig: Take 1 tablet (20 mg total) by mouth daily with breakfast   triamcinolone (KENALOG) 0.1 % cream   No No   Sig: Apply topically 2 (two) times a day      Facility-Administered Medications: None     Discharge Medication List as of 3/21/2025  9:43 PM        START taking these  medications    Details   !! triamcinolone (KENALOG) 0.1 % cream Apply topically 2 (two) times a day, Starting Fri 3/21/2025, Normal       !! - Potential duplicate medications found. Please discuss with provider.        CONTINUE these medications which have NOT CHANGED    Details   acetaminophen (TYLENOL) 500 mg tablet Take 500 mg by mouth every 4 (four) hours as needed, Starting Wed 4/26/2023, Historical Med      !! albuterol (ProAir HFA) 90 mcg/act inhaler Inhale 2 puffs every 6 (six) hours as needed for wheezing, Starting Fri 2/7/2025, Normal      !! albuterol (PROVENTIL HFA,VENTOLIN HFA) 90 mcg/act inhaler INHALE 2 PUFFS BY MOUTH EVERY 6 HOURS AS NEEDED FOR WHEEZING OR SHORTNESS OF BREATH, Normal      amLODIPine (NORVASC) 2.5 mg tablet Take 1 tablet by mouth once daily, Starting Fri 3/7/2025, Normal      atorvastatin (LIPITOR) 40 mg tablet Take 1 tablet (40 mg total) by mouth daily with dinner, Starting Fri 2/7/2025, Normal      DULoxetine (Cymbalta) 30 mg delayed release capsule Take 1 capsule (30 mg total) by mouth daily, Starting Fri 2/7/2025, Normal      furosemide (LASIX) 40 mg tablet Take 1 tablet (40 mg total) by mouth daily, Starting Fri 2/7/2025, Normal      hydrALAZINE (APRESOLINE) 100 MG tablet Take 1 tablet (100 mg total) by mouth 3 (three) times a day, Starting Fri 11/8/2024, Normal      hydrOXYzine HCL (ATARAX) 25 mg tablet Take 1 tablet (25 mg total) by mouth daily at bedtime, Starting Fri 2/7/2025, Normal      levothyroxine 137 mcg tablet Take 1 tablet (137 mcg total) by mouth every morning, Starting Fri 2/7/2025, Normal      losartan (COZAAR) 50 mg tablet Take 1 tablet (50 mg total) by mouth daily, Starting Fri 11/8/2024, Normal      methylPREDNISolone 4 MG tablet therapy pack Use as directed on package, Normal      rivaroxaban (Xarelto) 20 mg tablet Take 1 tablet (20 mg total) by mouth daily with breakfast, Starting Fri 2/7/2025, Normal      !! triamcinolone (KENALOG) 0.1 % cream Apply topically  2 (two) times a day, Starting Mon 2/10/2025, Normal       !! - Potential duplicate medications found. Please discuss with provider.          ED SEPSIS DOCUMENTATION   Time reflects when diagnosis was documented in both MDM as applicable and the Disposition within this note       Time User Action Codes Description Comment    3/21/2025  9:38 PM Rosas Bear Add [R21] Cali Bear PA-C  03/22/25 2304

## 2025-03-23 ENCOUNTER — TELEPHONE (OUTPATIENT)
Dept: DERMATOLOGY | Facility: CLINIC | Age: 70
End: 2025-03-23

## 2025-03-23 NOTE — TELEPHONE ENCOUNTER
68 yo F, history of asthma, DM, CKD, with generalized pruritic rash on b/l hands, forearms/upper arms, chest/abdomen, and thighs. No systemic symptoms associated. No recent illnesses. She was given permethrin for possible scabies which didn't help, and then medrol dosepak which did calm down her symptoms for about a week before returning. Rec triamcinolone ointment 0.1% bid 2 weeks to affected areas, counseled on steroid atrophy. Will need fu for longterm management

## 2025-03-24 ENCOUNTER — VBI (OUTPATIENT)
Dept: FAMILY MEDICINE CLINIC | Facility: CLINIC | Age: 70
End: 2025-03-24

## 2025-03-24 NOTE — TELEPHONE ENCOUNTER
03/24/25 8:33 AM    Patient contacted post ED visit, [facility] department spoke with patient/caregiver and outreach was successful.    Thank you.  IRVIN HELLER MA  [facility]

## 2025-03-26 ENCOUNTER — ANESTHESIA EVENT (OUTPATIENT)
Dept: ANESTHESIOLOGY | Facility: HOSPITAL | Age: 70
End: 2025-03-26

## 2025-03-26 ENCOUNTER — ANESTHESIA (OUTPATIENT)
Dept: ANESTHESIOLOGY | Facility: HOSPITAL | Age: 70
End: 2025-03-26

## 2025-04-22 ENCOUNTER — HOSPITAL ENCOUNTER (EMERGENCY)
Facility: HOSPITAL | Age: 70
Discharge: HOME | End: 2025-04-22
Attending: EMERGENCY MEDICINE
Payer: COMMERCIAL

## 2025-04-22 VITALS
TEMPERATURE: 98.3 F | OXYGEN SATURATION: 98 % | SYSTOLIC BLOOD PRESSURE: 210 MMHG | HEART RATE: 104 BPM | DIASTOLIC BLOOD PRESSURE: 97 MMHG | RESPIRATION RATE: 18 BRPM | BODY MASS INDEX: 27.8 KG/M2 | WEIGHT: 188.27 LBS

## 2025-04-22 DIAGNOSIS — L30.9 ECZEMATOUS DERMATITIS: Primary | ICD-10-CM

## 2025-04-22 PROCEDURE — 99284 EMERGENCY DEPT VISIT MOD MDM: CPT | Performed by: EMERGENCY MEDICINE

## 2025-04-22 PROCEDURE — 99281 EMR DPT VST MAYX REQ PHY/QHP: CPT

## 2025-04-22 RX ORDER — TRIAMCINOLONE ACETONIDE 1 MG/G
OINTMENT TOPICAL 2 TIMES DAILY
Qty: 30 G | Refills: 0 | Status: SHIPPED | OUTPATIENT
Start: 2025-04-22

## 2025-04-22 RX ORDER — KETOCONAZOLE 20 MG/G
CREAM TOPICAL 2 TIMES DAILY
Qty: 15 G | Refills: 0 | Status: SHIPPED | OUTPATIENT
Start: 2025-04-22

## 2025-04-23 ENCOUNTER — VBI (OUTPATIENT)
Dept: FAMILY MEDICINE CLINIC | Facility: CLINIC | Age: 70
End: 2025-04-23

## 2025-04-23 NOTE — TELEPHONE ENCOUNTER
04/23/25 9:39 AM    Patient contacted post ED visit, first outreach attempt made. Message was left for patient to return a call to the [facility] Department at [facility]: Phone [phone].    Thank you.  Alex Villafuerte MA  [facility]

## 2025-04-23 NOTE — DISCHARGE INSTRUCTIONS
Call [phone] this week to schedule a follow up appointment with Dermatology.    Try not to take hot showers every day - this strips away the natural oils on your skin and will worsen your symptoms.    Apply a non-scented moisturizing lotion or cream twice a day.

## 2025-04-23 NOTE — ED PROVIDER NOTES
Time reflects when diagnosis was documented in both MDM as applicable and the Disposition within this note       Time User Action Codes Description Comment    4/22/2025  9:18 PM Taylor Aguilar Add [L30.9] Eczematous dermatitis           ED Disposition       ED Disposition   Discharge    Condition   Stable    Date/Time   Tue Apr 22, 2025  9:17 PM    Comment   Isa Mcmahon discharge to home/self care.                   Assessment & Plan       Medical Decision Making  Reported itching w/ concerns of bed bugs or lice.  No findings of either.  Does appear to have severely dry skin with excoriated areas noted.     Long d/w pt regarding this.  Advised to not shower twice a day and if she's not doing anything that would get her dirty/sweats, consider just cleaning with a damp washcloth.  Advised to apply non-scented moisturizer/cream at least twice a day.    Rx for topical steroid for 1 wk  Rx for antifungal for seb derm to eyebrows/ears  Referred to derm    Problems Addressed:  Eczematous dermatitis: chronic illness or injury with exacerbation, progression, or side effects of treatment    Amount and/or Complexity of Data Reviewed  External Data Reviewed: notes.     Details: Prior visits reviewed    Risk  Prescription drug management.             Medications - No data to display    ED Risk Strat Scores                    No data recorded        SBIRT 22yo+      Flowsheet Row Most Recent Value   Initial Alcohol Screen: US AUDIT-C     1. How often do you have a drink containing alcohol? 1 Filed at: 04/22/2025 2028   2. How many drinks containing alcohol do you have on a typical day you are drinking?  1 Filed at: 04/22/2025 2028   3a. Male UNDER 65: How often do you have five or more drinks on one occasion? 0 Filed at: 04/22/2025 2028   3b. FEMALE Any Age, or MALE 65+: How often do you have 4 or more drinks on one occassion? 0 Filed at: 04/22/2025 2028   Audit-C Score 2 Filed at: 04/22/2025 2028   YESSY: How many times  in the past year have you...    Used an illegal drug or used a prescription medication for non-medical reasons? Never Filed at: 04/22/2025 2028                            History of Present Illness       Chief Complaint   Patient presents with    Insect Bite     Pt c/o generalized bed bug bites. Bed bugs noted at her home. Pt thinks she might have lice too         Past Medical History:   Diagnosis Date    Acute renal failure (ARF) (HCC)     Asthma     Atopic dermatitis     Chronic kidney disease     Stage 3    Colon polyps     Diabetes mellitus (HCC)     type 2    Disease of thyroid gland     hypothyroidism    Diverticulosis     Edema of both legs     Gout     Hemorrhoids     Hypertension     Nephrosclerosis     Peripheral neuropathy     Phlebitis     Venous stasis dermatitis of both lower extremities     Wrist joint pain     LEFT      Past Surgical History:   Procedure Laterality Date    CERVIX SURGERY      COLONOSCOPY N/A 5/31/2016    Procedure: COLONOSCOPY;  Surgeon: Barbara Wayne MD;  Location: [facility];  Service:     VEIN SURGERY        Family History   Problem Relation Age of Onset    Diabetes Mother         Type 2 as per allscripts    Heart attack Father     Diabetes type II Sister     Hypertension Other       Social History     Tobacco Use    Smoking status: Every Day     Current packs/day: 0.50     Average packs/day: 0.5 packs/day for 48.3 years (24.2 ttl pk-yrs)     Types: Cigarettes     Start date: 1977    Smokeless tobacco: Never    Tobacco comments:     4-5 cigarettes a day   Vaping Use    Vaping status: Never Used   Substance Use Topics    Alcohol use: Yes     Comment: social    Drug use: No      E-Cigarette/Vaping    E-Cigarette Use Never User       E-Cigarette/Vaping Substances    Nicotine No     THC No     CBD No     Flavoring No     Other No     Unknown No       I have reviewed and agree with the history as documented.     Patient presents with:  Insect Bite: Pt c/o generalized bed bug bites.  "Bed bugs noted at her home. Pt thinks she might have lice too      69y here with concerns for possible bed bugs and / or lice. Pt reports when her  came from a NH she said he had bed bugs and she \"can't get rid of them\".   Pt states that it's \"all over\" and \"I even brought some with me to show you\".  Denies pain, no redness or drainage from the areas, +itchy    No new soaps, lotions, detergents, no new furniture, no new pets. Pt showers daily or sometimes twice daily.  Was seen here the end of March for similar complaints and given a cream that hasn't helped.      History provided by:  Patient and medical records   used: No    Insect Bite      Review of Systems   All other systems reviewed and are negative.          Objective       ED Triage Vitals [04/22/25 2025]   Temperature Pulse Blood Pressure Respirations SpO2 Patient Position - Orthostatic VS   98.3 °F (36.8 °C) 104 (!) 210/97 18 98 % Sitting      Temp src Heart Rate Source BP Location FiO2 (%) Pain Score    -- Monitor Right arm -- --      Vitals      Date and Time Temp Pulse SpO2 Resp BP Pain Score FACES Pain Rating User   04/22/25 2025 98.3 °F (36.8 °C) 104 98 % 18 210/97 -- -- RK            Physical Exam  Vitals and nursing note reviewed.   Constitutional:       Appearance: Normal appearance.   HENT:      Mouth/Throat:      Mouth: Mucous membranes are moist.   Eyes:      Conjunctiva/sclera: Conjunctivae normal.   Cardiovascular:      Rate and Rhythm: Regular rhythm. Tachycardia present.   Pulmonary:      Effort: Pulmonary effort is normal.   Musculoskeletal:      Cervical back: Normal range of motion.   Skin:     Capillary Refill: Capillary refill takes less than 2 seconds.      Findings: Rash present.      Comments: Scattered areas of excoriation noted.  Diffuse areas of very dry, flaky skin.  No lesions noted in the finger webbing.    Areas of concern not c/w bed bugs and no evidence of scabies    Areas in eyebrow/ears look to " be more c/w seborrheic dermatitis   Neurological:      Mental Status: She is alert.   Psychiatric:         Mood and Affect: Mood is anxious.         Results Reviewed       None            No orders to display       Procedures    ED Medication and Procedure Management   Prior to Admission Medications   Prescriptions Last Dose Informant Patient Reported? Taking?   DULoxetine (Cymbalta) 30 mg delayed release capsule   No No   Sig: Take 1 capsule (30 mg total) by mouth daily   acetaminophen (TYLENOL) 500 mg tablet  Self Yes No   Sig: Take 500 mg by mouth every 4 (four) hours as needed   albuterol (PROVENTIL HFA,VENTOLIN HFA) 90 mcg/act inhaler  Self No No   Sig: INHALE 2 PUFFS BY MOUTH EVERY 6 HOURS AS NEEDED FOR WHEEZING OR SHORTNESS OF BREATH   albuterol (ProAir HFA) 90 mcg/act inhaler   No No   Sig: Inhale 2 puffs every 6 (six) hours as needed for wheezing   amLODIPine (NORVASC) 2.5 mg tablet   No No   Sig: Take 1 tablet by mouth once daily   atorvastatin (LIPITOR) 40 mg tablet   No No   Sig: Take 1 tablet (40 mg total) by mouth daily with dinner   furosemide (LASIX) 40 mg tablet   No No   Sig: Take 1 tablet (40 mg total) by mouth daily   hydrALAZINE (APRESOLINE) 100 MG tablet   No No   Sig: Take 1 tablet (100 mg total) by mouth 3 (three) times a day   hydrOXYzine HCL (ATARAX) 25 mg tablet   No No   Sig: Take 1 tablet (25 mg total) by mouth daily at bedtime   levothyroxine 137 mcg tablet   No No   Sig: Take 1 tablet (137 mcg total) by mouth every morning   losartan (COZAAR) 50 mg tablet   No No   Sig: Take 1 tablet (50 mg total) by mouth daily   methylPREDNISolone 4 MG tablet therapy pack   No No   Sig: Use as directed on package   rivaroxaban (Xarelto) 20 mg tablet   No No   Sig: Take 1 tablet (20 mg total) by mouth daily with breakfast   triamcinolone (KENALOG) 0.1 % cream   No No   Sig: Apply topically 2 (two) times a day   triamcinolone (KENALOG) 0.1 % cream   No No   Sig: Apply topically 2 (two) times a day       Facility-Administered Medications: None     Discharge Medication List as of 4/22/2025  9:28 PM        START taking these medications    Details   ketoconazole (NIZORAL) 2 % cream Apply topically 2 (two) times a day Apply to the eyebrows / ears bid for up to two weeks, Starting Tue 4/22/2025, Normal      triamcinolone (KENALOG) 0.1 % ointment Apply topically 2 (two) times a day Do not apply to the affected areas for more than 7-10 day, Starting Tue 4/22/2025, Normal           CONTINUE these medications which have NOT CHANGED    Details   acetaminophen (TYLENOL) 500 mg tablet Take 500 mg by mouth every 4 (four) hours as needed, Starting Wed 4/26/2023, Historical Med      !! albuterol (ProAir HFA) 90 mcg/act inhaler Inhale 2 puffs every 6 (six) hours as needed for wheezing, Starting Fri 2/7/2025, Normal      !! albuterol (PROVENTIL HFA,VENTOLIN HFA) 90 mcg/act inhaler INHALE 2 PUFFS BY MOUTH EVERY 6 HOURS AS NEEDED FOR WHEEZING OR SHORTNESS OF BREATH, Normal      amLODIPine (NORVASC) 2.5 mg tablet Take 1 tablet by mouth once daily, Starting Fri 3/7/2025, Normal      atorvastatin (LIPITOR) 40 mg tablet Take 1 tablet (40 mg total) by mouth daily with dinner, Starting Fri 2/7/2025, Normal      DULoxetine (Cymbalta) 30 mg delayed release capsule Take 1 capsule (30 mg total) by mouth daily, Starting Fri 2/7/2025, Normal      furosemide (LASIX) 40 mg tablet Take 1 tablet (40 mg total) by mouth daily, Starting Fri 2/7/2025, Normal      hydrALAZINE (APRESOLINE) 100 MG tablet Take 1 tablet (100 mg total) by mouth 3 (three) times a day, Starting Fri 11/8/2024, Normal      hydrOXYzine HCL (ATARAX) 25 mg tablet Take 1 tablet (25 mg total) by mouth daily at bedtime, Starting Fri 2/7/2025, Normal      levothyroxine 137 mcg tablet Take 1 tablet (137 mcg total) by mouth every morning, Starting Fri 2/7/2025, Normal      losartan (COZAAR) 50 mg tablet Take 1 tablet (50 mg total) by mouth daily, Starting Fri 11/8/2024, Normal       methylPREDNISolone 4 MG tablet therapy pack Use as directed on package, Normal      rivaroxaban (Xarelto) 20 mg tablet Take 1 tablet (20 mg total) by mouth daily with breakfast, Starting Fri 2/7/2025, Normal      !! triamcinolone (KENALOG) 0.1 % cream Apply topically 2 (two) times a day, Starting Mon 2/10/2025, Normal      !! triamcinolone (KENALOG) 0.1 % cream Apply topically 2 (two) times a day, Starting Fri 3/21/2025, Normal       !! - Potential duplicate medications found. Please discuss with provider.          ED SEPSIS DOCUMENTATION   Time reflects when diagnosis was documented in both MDM as applicable and the Disposition within this note       Time User Action Codes Description Comment    4/22/2025  9:18 PM Taylor Aguilar Add [L30.9] Eczematous dermatitis                  Taylor Aguilar,   04/22/25 [id number]

## 2025-04-23 NOTE — LETTER
[facility]  [address]  [address]  [facility] [id number] [phone]    Date: 04/25/25    Isa Mcmahon  [address] [place] [id number]    Dear Isa:                                                                                                                                Thank you for choosing [facility] emergency department for care.  Your primary care provider wants to make sure that your ongoing medical care is being addressed. If you require follow up care as a result of your emergency department visit, there are a few things the practice would like you to know.                As part of the network's continuing commitment to caring for our patients, we have added more same day appointments and have extended office hours to meet your medical needs. After hours, on-call physicians are available via your primary care provider's main office line.               We encourage you to contact our office prior to seeking treatment to discuss your symptoms with the medical staff.  Together, we can determine the correct course of action.  A majority of non-emergent conditions such as: common cold, flu-like symptoms, fevers, strains/sprains, dislocations, minor burns, cuts and animal bites can be treated at [facility] facilities. Diagnostic testing is available at some sites.               Of course, if you are experiencing a life threatening medical emergency call 911 or proceed directly to the nearest emergency room.    Your nearest [facility] facility is conveniently located at:    [facility]  [address]  [place] [address]  [phone]  SKIP THE WAIT  Conveniently offered at most [facility] locations  [place] your spot online at [email or web address] or on the [facility]    Sincerely,    [facility]  Dept: [phone]

## 2025-04-24 NOTE — TELEPHONE ENCOUNTER
04/24/25 10:07 AM    Patient contacted post ED visit, second outreach attempt made. Message was left for patient to return a call to the [facility] Department at Alex: Phone [phone].    Thank you.  Alex Villafuerte MA  [facility]

## 2025-04-25 NOTE — TELEPHONE ENCOUNTER
04/25/25 8:34 AM    Patient contacted post ED visit, phone outreaches were unsuccessful and a MyChart letter has been sent to the patient as follow-up.    Thank you.  Alex Villafuerte MA  [facility]

## 2025-05-19 ENCOUNTER — OFFICE VISIT (OUTPATIENT)
Dept: FAMILY MEDICINE CLINIC | Facility: CLINIC | Age: 70
End: 2025-05-19
Payer: COMMERCIAL

## 2025-05-19 ENCOUNTER — APPOINTMENT (OUTPATIENT)
Dept: RADIOLOGY | Facility: MEDICAL CENTER | Age: 70
End: 2025-05-19
Payer: COMMERCIAL

## 2025-05-19 VITALS
OXYGEN SATURATION: 98 % | DIASTOLIC BLOOD PRESSURE: 70 MMHG | SYSTOLIC BLOOD PRESSURE: 134 MMHG | HEART RATE: 101 BPM | WEIGHT: 184 LBS | BODY MASS INDEX: 27.17 KG/M2

## 2025-05-19 DIAGNOSIS — N18.32 STAGE 3B CHRONIC KIDNEY DISEASE (HCC): ICD-10-CM

## 2025-05-19 DIAGNOSIS — M19.042 ARTHRITIS OF BOTH HANDS: Primary | ICD-10-CM

## 2025-05-19 DIAGNOSIS — I10 ESSENTIAL HYPERTENSION: ICD-10-CM

## 2025-05-19 DIAGNOSIS — M19.042 ARTHRITIS OF BOTH HANDS: ICD-10-CM

## 2025-05-19 DIAGNOSIS — M19.041 ARTHRITIS OF BOTH HANDS: ICD-10-CM

## 2025-05-19 DIAGNOSIS — L30.1 DYSHIDROTIC ECZEMA: ICD-10-CM

## 2025-05-19 DIAGNOSIS — L29.9 ITCHING: ICD-10-CM

## 2025-05-19 DIAGNOSIS — M19.041 ARTHRITIS OF BOTH HANDS: Primary | ICD-10-CM

## 2025-05-19 DIAGNOSIS — Z12.31 ENCOUNTER FOR SCREENING MAMMOGRAM FOR BREAST CANCER: ICD-10-CM

## 2025-05-19 DIAGNOSIS — I82.409 RECURRENT DEEP VEIN THROMBOSIS (DVT) (HCC): ICD-10-CM

## 2025-05-19 DIAGNOSIS — F17.210 SMOKING GREATER THAN 20 PACK YEARS: ICD-10-CM

## 2025-05-19 PROCEDURE — 99214 OFFICE O/P EST MOD 30 MIN: CPT | Performed by: FAMILY MEDICINE

## 2025-05-19 PROCEDURE — 73130 X-RAY EXAM OF HAND: CPT

## 2025-05-19 RX ORDER — PERMETHRIN 50 MG/G
CREAM TOPICAL
COMMUNITY
Start: 2025-03-13 | End: 2025-05-19

## 2025-05-19 RX ORDER — TRIAMCINOLONE ACETONIDE 1 MG/G
CREAM TOPICAL 2 TIMES DAILY
Qty: 80 G | Refills: 5 | Status: SHIPPED | OUTPATIENT
Start: 2025-05-19 | End: 2025-05-26

## 2025-05-19 RX ORDER — AMLODIPINE BESYLATE 2.5 MG/1
2.5 TABLET ORAL DAILY
Qty: 90 TABLET | Refills: 0 | Status: SHIPPED | OUTPATIENT
Start: 2025-05-19

## 2025-05-19 RX ORDER — LOSARTAN POTASSIUM 50 MG/1
50 TABLET ORAL DAILY
Qty: 90 TABLET | Refills: 3 | Status: SHIPPED | OUTPATIENT
Start: 2025-05-19

## 2025-05-19 NOTE — PROGRESS NOTES
Name: Isa Mcmahon      : 1955      MRN: [id number]  Encounter Provider: Dinh Granger MD  Encounter Date: 2025   Encounter department: [facility]  :  Assessment & Plan  Smoking greater than 20 pack years  Due for screening    Orders:    CT lung screening program; Future    Encounter for screening mammogram for breast cancer    Orders:    Mammo screening bilateral w 3d and cad; Future    Itching    Orders:    triamcinolone (KENALOG) 0.1 % cream; Apply topically 2 (two) times a day for 7 days    Arthritis of both hands    Check xray and refer to orthopedics, worsening over last two months.    Orders:    XR hand 3+ vw left; Future    XR hand 3+ vw right; Future    Ambulatory Referral to Orthopedic Surgery; Future    Recurrent deep vein thrombosis (DVT) (HCC)    Unable to afford Xarelto, is going to discuss wit her insurance company, declines alternative treatments, risks have been discussed.       Stage 3b chronic kidney disease (HCC)  Lab Results   Component Value Date    EGFR 50 2024    EGFR 59 2024    EGFR 47 2023    CREATININE 1.13 2024    CREATININE 0.99 2024    CREATININE 1.20 2023       Repeat labs pending.    Orders:    losartan (COZAAR) 50 mg tablet; Take 1 tablet (50 mg total) by mouth daily    Essential hypertension    Controlled on current regimen  Orders:    amLODIPine (NORVASC) 2.5 mg tablet; Take 1 tablet (2.5 mg total) by mouth daily    Dyshidrotic eczema    Noted on hands, triamcinolone as needed, regular moisturizer             Depression Screening and Follow-up Plan: Patient was screened for depression during today's encounter. They screened negative with a PHQ-9 score of 0.        History of Present Illness   HPI    69 year old female rpesenting in follow up of htn, DVT, eczema, overall feeling similar to previous.    Taking care of  and helps with grandchildren.    Has worsening hand pain over last  3 months, espeically thumb, thumb locked and has difficulty moving for last two motnhs.    Review of Systems   Constitutional:  Negative for chills and fever.   HENT:  Negative for ear pain and sore throat.    Eyes:  Negative for pain and visual disturbance.   Respiratory:  Negative for cough and shortness of breath.    Cardiovascular:  Negative for chest pain and palpitations.   Gastrointestinal:  Negative for abdominal pain and vomiting.   Genitourinary:  Negative for dysuria and hematuria.   Musculoskeletal:  Negative for arthralgias and back pain.   Skin:  Negative for color change and rash.   Neurological:  Negative for seizures and syncope.   All other systems reviewed and are negative.      Objective   /70 (BP Location: Left arm, Patient Position: Sitting)   Pulse 101   Wt 83.5 kg (184 lb)   SpO2 98%   BMI 27.17 kg/m²      Physical Exam  Vitals and nursing note reviewed.   Constitutional:       General: She is not in acute distress.     Appearance: She is well-developed.   HENT:      Head: Normocephalic and atraumatic.     Eyes:      Conjunctiva/sclera: Conjunctivae normal.       Cardiovascular:      Rate and Rhythm: Normal rate and regular rhythm.      Heart sounds: No murmur heard.  Pulmonary:      Effort: Pulmonary effort is normal. No respiratory distress.      Breath sounds: Normal breath sounds.   Abdominal:      Palpations: Abdomen is soft.      Tenderness: There is no abdominal tenderness.     Musculoskeletal:         General: No swelling.      Right hand: Tenderness present. Decreased range of motion.      Left hand: Tenderness present. Decreased range of motion.      Cervical back: Neck supple.      Comments: Swelling of MCP, middle finger right hand deformity (old fracture)  Left hand limited mobility of thumb     Skin:     General: Skin is warm and dry.      Capillary Refill: Capillary refill takes less than 2 seconds.     Neurological:      Mental Status: She is alert.     Psychiatric:          Mood and Affect: Mood normal.

## 2025-05-19 NOTE — ASSESSMENT & PLAN NOTE
Lab Results   Component Value Date    EGFR 50 11/18/2024    EGFR 59 03/12/2024    EGFR 47 07/27/2023    CREATININE 1.13 11/18/2024    CREATININE 0.99 03/12/2024    CREATININE 1.20 07/27/2023       Repeat labs pending.    Orders:    losartan (COZAAR) 50 mg tablet; Take 1 tablet (50 mg total) by mouth daily

## 2025-05-19 NOTE — ASSESSMENT & PLAN NOTE
Unable to afford Xarelto, is going to discuss wit her insurance company, declines alternative treatments, risks have been discussed.

## 2025-05-19 NOTE — ASSESSMENT & PLAN NOTE
Controlled on current regimen  Orders:    amLODIPine (NORVASC) 2.5 mg tablet; Take 1 tablet (2.5 mg total) by mouth daily

## 2025-05-20 ENCOUNTER — RESULTS FOLLOW-UP (OUTPATIENT)
Dept: FAMILY MEDICINE CLINIC | Facility: CLINIC | Age: 70
End: 2025-05-20

## 2025-07-10 DIAGNOSIS — L30.9 ECZEMATOUS DERMATITIS: ICD-10-CM

## 2025-07-10 DIAGNOSIS — J45.20 MILD INTERMITTENT ASTHMA WITHOUT COMPLICATION: ICD-10-CM

## 2025-07-10 DIAGNOSIS — L29.9 ITCHING: ICD-10-CM

## 2025-07-10 DIAGNOSIS — I10 ESSENTIAL HYPERTENSION: ICD-10-CM

## 2025-07-11 RX ORDER — ALBUTEROL SULFATE 90 UG/1
2 INHALANT RESPIRATORY (INHALATION) EVERY 6 HOURS PRN
Qty: 8.5 G | Refills: 1 | Status: SHIPPED | OUTPATIENT
Start: 2025-07-11

## 2025-07-11 RX ORDER — HYDRALAZINE HYDROCHLORIDE 100 MG/1
100 TABLET, FILM COATED ORAL 3 TIMES DAILY
Qty: 90 TABLET | Refills: 0 | Status: SHIPPED | OUTPATIENT
Start: 2025-07-11

## 2025-07-11 RX ORDER — AMLODIPINE BESYLATE 2.5 MG/1
2.5 TABLET ORAL DAILY
Qty: 90 TABLET | Refills: 1 | Status: SHIPPED | OUTPATIENT
Start: 2025-07-11

## 2025-07-11 RX ORDER — TRIAMCINOLONE ACETONIDE 1 MG/G
CREAM TOPICAL 2 TIMES DAILY
Qty: 80 G | Refills: 0 | Status: SHIPPED | OUTPATIENT
Start: 2025-07-11 | End: 2025-07-18

## 2025-07-18 RX ORDER — KETOCONAZOLE 20 MG/G
CREAM TOPICAL 2 TIMES DAILY
Qty: 15 G | Refills: 0 | Status: SHIPPED | OUTPATIENT
Start: 2025-07-18

## 2025-08-14 ENCOUNTER — HOSPITAL ENCOUNTER (EMERGENCY)
Facility: HOSPITAL | Age: 70
Discharge: HOME | End: 2025-08-14
Attending: EMERGENCY MEDICINE | Admitting: EMERGENCY MEDICINE
Payer: COMMERCIAL

## 2025-08-14 VITALS
DIASTOLIC BLOOD PRESSURE: 89 MMHG | WEIGHT: 187.83 LBS | SYSTOLIC BLOOD PRESSURE: 134 MMHG | HEART RATE: 101 BPM | TEMPERATURE: 98 F | RESPIRATION RATE: 16 BRPM | BODY MASS INDEX: 27.74 KG/M2 | OXYGEN SATURATION: 99 %

## 2025-08-14 DIAGNOSIS — L29.9 ITCHING: ICD-10-CM

## 2025-08-14 DIAGNOSIS — R21 RASH: Primary | ICD-10-CM

## 2025-08-14 PROCEDURE — 99283 EMERGENCY DEPT VISIT LOW MDM: CPT

## 2025-08-14 PROCEDURE — 99284 EMERGENCY DEPT VISIT MOD MDM: CPT | Performed by: EMERGENCY MEDICINE

## 2025-08-14 RX ORDER — HYDROXYZINE HYDROCHLORIDE 25 MG/1
25 TABLET, FILM COATED ORAL 2 TIMES DAILY
Qty: 28 TABLET | Refills: 0 | Status: SHIPPED | OUTPATIENT
Start: 2025-08-14 | End: 2025-08-28

## 2025-08-14 RX ORDER — HYDROXYZINE HYDROCHLORIDE 25 MG/1
25 TABLET, FILM COATED ORAL 2 TIMES DAILY
Qty: 28 TABLET | Refills: 0 | Status: SHIPPED | OUTPATIENT
Start: 2025-08-14 | End: 2025-08-14

## 2025-08-14 RX ORDER — TRIAMCINOLONE ACETONIDE 1 MG/G
CREAM TOPICAL 2 TIMES DAILY
Qty: 30 G | Refills: 0 | Status: SHIPPED | OUTPATIENT
Start: 2025-08-14